# Patient Record
Sex: FEMALE | Race: WHITE | NOT HISPANIC OR LATINO | Employment: OTHER | URBAN - METROPOLITAN AREA
[De-identification: names, ages, dates, MRNs, and addresses within clinical notes are randomized per-mention and may not be internally consistent; named-entity substitution may affect disease eponyms.]

---

## 2017-04-21 ENCOUNTER — GENERIC CONVERSION - ENCOUNTER (OUTPATIENT)
Dept: OTHER | Facility: OTHER | Age: 77
End: 2017-04-21

## 2017-04-28 ENCOUNTER — GENERIC CONVERSION - ENCOUNTER (OUTPATIENT)
Dept: OTHER | Facility: OTHER | Age: 77
End: 2017-04-28

## 2017-05-01 ENCOUNTER — GENERIC CONVERSION - ENCOUNTER (OUTPATIENT)
Dept: OTHER | Facility: OTHER | Age: 77
End: 2017-05-01

## 2017-05-09 ENCOUNTER — ALLSCRIPTS OFFICE VISIT (OUTPATIENT)
Dept: OTHER | Facility: OTHER | Age: 77
End: 2017-05-09

## 2017-05-09 DIAGNOSIS — I48.0 PAROXYSMAL ATRIAL FIBRILLATION (HCC): ICD-10-CM

## 2017-05-16 ENCOUNTER — LAB CONVERSION - ENCOUNTER (OUTPATIENT)
Dept: OTHER | Facility: OTHER | Age: 77
End: 2017-05-16

## 2017-05-16 LAB
A/G RATIO (HISTORICAL): 1.6 (CALC) (ref 1–2.5)
ALBUMIN SERPL BCP-MCNC: 3.9 G/DL (ref 3.6–5.1)
ALP SERPL-CCNC: 79 U/L (ref 33–130)
ALT SERPL W P-5'-P-CCNC: 16 U/L (ref 6–29)
AST SERPL W P-5'-P-CCNC: 18 U/L (ref 10–35)
BILIRUB SERPL-MCNC: 0.4 MG/DL (ref 0.2–1.2)
BUN SERPL-MCNC: 17 MG/DL (ref 7–25)
BUN/CREA RATIO (HISTORICAL): NORMAL (CALC) (ref 6–22)
CALCIUM SERPL-MCNC: 9 MG/DL (ref 8.6–10.4)
CHLORIDE SERPL-SCNC: 105 MMOL/L (ref 98–110)
CO2 SERPL-SCNC: 28 MMOL/L (ref 20–31)
CREAT SERPL-MCNC: 0.72 MG/DL (ref 0.6–0.93)
DEPRECATED RDW RBC AUTO: 13.4 % (ref 11–15)
EGFR AFRICAN AMERICAN (HISTORICAL): 95 ML/MIN/1.73M2
EGFR-AMERICAN CALC (HISTORICAL): 82 ML/MIN/1.73M2
GAMMA GLOBULIN (HISTORICAL): 2.4 G/DL (CALC) (ref 1.9–3.7)
GLUCOSE (HISTORICAL): 81 MG/DL (ref 65–99)
HCT VFR BLD AUTO: 37.3 % (ref 35–45)
HGB BLD-MCNC: 12.2 G/DL (ref 11.7–15.5)
INR PPP: 1
MCH RBC QN AUTO: 31.6 PG (ref 27–33)
MCHC RBC AUTO-ENTMCNC: 32.7 G/DL (ref 32–36)
MCV RBC AUTO: 96.6 FL (ref 80–100)
PLATELET # BLD AUTO: 301 THOUSAND/UL (ref 140–400)
PMV BLD AUTO: 10.4 FL (ref 7.5–12.5)
POTASSIUM SERPL-SCNC: 4.5 MMOL/L (ref 3.5–5.3)
PROTHROMBIN TIME: 10.2 SEC (ref 9–11.5)
RBC # BLD AUTO: 3.86 MILLION/UL (ref 3.8–5.1)
SODIUM SERPL-SCNC: 139 MMOL/L (ref 135–146)
TOTAL PROTEIN (HISTORICAL): 6.3 G/DL (ref 6.1–8.1)
WBC # BLD AUTO: 8.3 THOUSAND/UL (ref 3.8–10.8)

## 2017-05-18 ENCOUNTER — HOSPITAL ENCOUNTER (OUTPATIENT)
Dept: CT IMAGING | Facility: HOSPITAL | Age: 77
Discharge: HOME | End: 2017-05-18
Attending: INTERNAL MEDICINE
Payer: MEDICARE

## 2017-05-18 ENCOUNTER — ALLSCRIPTS OFFICE VISIT (OUTPATIENT)
Dept: OTHER | Facility: OTHER | Age: 77
End: 2017-05-18

## 2017-05-18 DIAGNOSIS — I48.0 PAROXYSMAL ATRIAL FIBRILLATION (HCC): ICD-10-CM

## 2017-05-18 PROCEDURE — 75572 CT HRT W/3D IMAGE: CPT

## 2017-05-18 RX ADMIN — IODIXANOL 120 ML: 320 INJECTION, SOLUTION INTRAVASCULAR at 16:58

## 2018-01-13 VITALS
DIASTOLIC BLOOD PRESSURE: 60 MMHG | TEMPERATURE: 64 F | SYSTOLIC BLOOD PRESSURE: 130 MMHG | BODY MASS INDEX: 21.36 KG/M2 | WEIGHT: 120.56 LBS | HEIGHT: 63 IN

## 2018-01-14 VITALS
DIASTOLIC BLOOD PRESSURE: 70 MMHG | SYSTOLIC BLOOD PRESSURE: 140 MMHG | HEIGHT: 63 IN | WEIGHT: 120 LBS | HEART RATE: 64 BPM | BODY MASS INDEX: 21.26 KG/M2

## 2020-08-31 ENCOUNTER — OFFICE VISIT (OUTPATIENT)
Dept: DERMATOLOGY | Facility: CLINIC | Age: 80
End: 2020-08-31
Payer: COMMERCIAL

## 2020-08-31 VITALS — WEIGHT: 118 LBS | TEMPERATURE: 98.2 F | BODY MASS INDEX: 20.91 KG/M2 | HEIGHT: 63 IN

## 2020-08-31 DIAGNOSIS — L82.0 INFLAMED SEBORRHEIC KERATOSIS: ICD-10-CM

## 2020-08-31 DIAGNOSIS — D18.01 CHERRY ANGIOMA: ICD-10-CM

## 2020-08-31 DIAGNOSIS — L65.9 ALOPECIA: ICD-10-CM

## 2020-08-31 DIAGNOSIS — L71.9 ROSACEA: Primary | ICD-10-CM

## 2020-08-31 PROCEDURE — 99204 OFFICE O/P NEW MOD 45 MIN: CPT | Performed by: DERMATOLOGY

## 2020-08-31 PROCEDURE — 17111 DESTRUCTION B9 LESIONS 15/>: CPT | Performed by: DERMATOLOGY

## 2020-08-31 RX ORDER — METOPROLOL SUCCINATE 50 MG/1
50 TABLET, EXTENDED RELEASE ORAL DAILY
COMMUNITY
Start: 2020-08-28 | End: 2021-08-28

## 2020-08-31 RX ORDER — IVERMECTIN 10 MG/G
CREAM TOPICAL
Qty: 45 G | Refills: 5 | Status: SHIPPED | OUTPATIENT
Start: 2020-08-31 | End: 2021-12-15 | Stop reason: SDUPTHER

## 2020-08-31 RX ORDER — ESTRADIOL 0.1 MG/G
CREAM VAGINAL 2 TIMES WEEKLY
COMMUNITY

## 2020-08-31 RX ORDER — CYCLOSPORINE 0.5 MG/ML
1 EMULSION OPHTHALMIC 2 TIMES DAILY
COMMUNITY

## 2020-08-31 RX ORDER — PERPHENAZINE/AMITRIPTYLINE HCL 4 MG-25 MG
TABLET ORAL DAILY
COMMUNITY

## 2020-08-31 RX ORDER — CLOBETASOL PROPIONATE 0.5 MG/G
CREAM TOPICAL 2 TIMES DAILY
COMMUNITY

## 2020-08-31 NOTE — PATIENT INSTRUCTIONS
1  ALOPECIA       Assessment and Plan:  Based on a thorough discussion of this condition and the management approach to it (including a comprehensive discussion of the known risks, side effects and potential benefits of treatment), the patient (family) agrees to implement the following specific plan:    · Discussed with patient today of doing a punch biopsy in the future in the scalp area  Rule out lichen planopilaris  2  ROSACEA        Assessment and Plan:  Based on a thorough discussion of this condition and the management approach to it (including a comprehensive discussion of the known risks, side effects and potential benefits of treatment), the patient (family) agrees to implement the following specific plan:   Refilled Solatran order for rosacea  Apply topically 1-2 times a day as needed  Rosacea is a chronic rash affecting the mid-face including the nose, cheeks, chin, forehead, and eyelids  The incidence is usually greatest between the ages of 30-60 years and is more common in people with fair skin  Common characteristics include redness, telangiectasias, papules and pustules over affected areas  Rosacea may look similar to acne, but there is a lack of comedones  Occasionally the eyes may also be involved in ocular rosacea  In advanced disease, enlargement of the sebaceous glands in the nose, termed rhinophyma, may be present  Rosacea results in red spots (papules) and sometimes pustules over the face, but unlike acne there are no blackheads, whiteheads, or cystic nodules  Patients often experience increased facial flushing with prominent blood vessels (erythematotelangiectatic rosacea) and dry, sensitive skin  These symptoms are exacerbated by sun exposure, hot or spicy foods, topical steroids and oil-based facial products  In ocular rosacea, eyelids may be red and sore due to conjunctivitis, keratitis, and episcleritis   If rhinophyma develops due to enlargement of sebaceous glands, the patient may have an enlarged and irregularly shaped nose with prominent pores  In rosacea that is refractory to treatment, patients can develop persistent redness and swelling of the face due to lymphatic obstruction (Morbihan disease)  Distribution around the cheeks may be confused with the malar or butterfly rash of lupus  However, the rash of lupus spares the nasal creases and lacks papules and pustules  If signs of photosensitivity, oral ulcers, arthritis, and kidney dysfunction are present then consider referral to a rheumatologist      There are many potential causes of rosacea including genetic, environmental, vascular, and inflammatory factors   These include, but are not limited to:   Chronic exposure to ultraviolet radiation    Increased immune responses in the form of cathelicidins that promote vessel dilation and infiltration with white blood cells (neutrophils) into the dermis   Increased matrix metalloproteinases such as collagen and elastase that remodel normal tissue may contribute to inflammation of the skin making it thicker and harder   There is some evidence to suggest that increased numbers of demodex mites on patient skin may contribute to rosacea papules     General Treatment Approach    Avoid exacerbating factors such as heat, spicy foods, and alcohol    Use daily SPF30+ sunscreen and other methods of coverage for sun protection   Use water-based make-up    Avoid applying topical steroids to affected areas as they can cause perioral dermatitis and exacerbate rosacea     Topical Treatment Approach   Metronidazole cream or gel by itself or in combination with oral antibiotics for more severe cases   Azelaic acid cream or lotion is effective for mild inflammatory rosacea when applied twice daily to affected areas   Brimonidine gel and oxymetazoline hydrochloride cream can reduce facial redness temporarily    Ivermectin cream can treat papulopustular rosacea by controlling demodex mites and inflammation    Pimecrolimus cream or tacrolimus ointment twice a day for 2-3 months can help reduce inflammation    Oral Treatment Approach   Antibiotics such as doxycycline, minocycline, or erythromycin for 1-3 months   Clonidine and carvedilol can help reduce facial flushing and are generally well tolerated  Common side effects include low blood pressure, gastrointestinal upset, dry eyes, blurred vision and low heart rate   Isotretinoin at low doses can be effective for long term treatment when antibiotics fail  Side effects may make it unsuitable for some patients   NSAIDs such as diclofenac can help reduce discomfort and redness in the skin  Procedural/Surgical Treatment Approach    Vascular lasers or intense pulsed light treatment may be used to treat persistent telangiectasia and papulopustular rosacea   Plastic surgery and carbon dioxide lasers may be used to treat rhinophyma         4  SEBORRHEIC KERATOSIS; INFLAMED        Assessment and Plan:  Based on a thorough discussion of this condition and the management approach to it (including a comprehensive discussion of the known risks, side effects and potential benefits of treatment), the patient (family) agrees to implement the following specific plan:   Obtained consent form patient to treat lesion with Cryotherapy today   Liquid nitrogen was applied for 10-12 seconds to the skin lesion and the expected blistering or scabbing reaction explained  Do not pick at the area  Patient reminded to expect hypopigmented scars from the procedure  Return if lesion fails to fully resolve   Recommend patient to follow up for treatment  Seborrheic Keratosis  A seborrheic keratosis is a harmless warty spot that appears during adult life as a common sign of skin aging  Seborrheic keratoses can arise on any area of skin, covered or uncovered, with the usual exception of the palms and soles  They do not arise from mucous membranes  Seborrheic keratoses can have highly variable appearance  Seborrheic keratoses are extremely common  It has been estimated that over 90% of adults over the age of 61 years have one or more of them  They occur in males and females of all races, typically beginning to erupt in the 35s or 45s  They are uncommon under the age of 21 years  The precise cause of seborrhoeic keratoses is not known  Seborrhoeic keratoses are considered degenerative in nature  As time goes by, seborrheic keratoses tend to become more numerous  Some people inherit a tendency to develop a very large number of them; some people may have hundreds of them  The name "seborrheic keratosis" is misleading, because these lesions are not limited to a seborrhoeic distribution (scalp, mid-face, chest, upper back), nor are they formed from sebaceous glands, nor are they associated with sebum -- which is greasy  Seborrheic keratosis may also be called "SK," "Seb K," "basal cell papilloma," "senile wart," or "barnacle "      Researchers have noted:   Eruptive seborrhoeic keratoses can follow sunburn or dermatitis   Skin friction may be the reason they appear in body folds   Viral cause (e g , human papillomavirus) seems unlikely   Stable and clonal mutations or activation of FRFR3, PIK3CA, ANDREEA, AKT1 and EGFR genes are found in seborrhoeic keratoses   Seborrhoeic keratosis can arise from solar lentigo   FRFR3 mutations also arise in solar lentigines  These mutations are associated with increased age and location on the head and neck, suggesting a role of ultraviolet radiation in these lesions   Seborrheic keratoses do not harbour tumour suppressor gene mutations   Epidermal growth factor receptor inhibitors, which are used to treat some cancers, often result in an increase in verrucal (warty) keratoses  There is no easy way to remove multiple lesions on a single occasion    Unless a specific lesion is "inflamed" and is causing pain or stinging/burning or is bleeding, most insurance companies do not authorize treatment  5  JOEL ANGIOMAS        Assessment and Plan:  Based on a thorough discussion of this condition and the management approach to it (including a comprehensive discussion of the known risks, side effects and potential benefits of treatment), the patient (family) agrees to implement the following specific plan:   Reassure benign, no treatment required  Assessment and Plan:    Cherry angioma, also known as Candie Sorrel spots, are benign vascular skin lesions  A "cherry angioma" is a firm red, blue or purple papule, 0 1-1 cm in diameter  When thrombosed, they can appear black in colour until evaluated with a dermatoscope when the red or purple colour is more easily seen  Cherry angioma may develop on any part of the body but most often appear on the scalp, face, lips and trunk  An angioma is due to proliferating endothelial cells; these are the cells that line the inside of a blood vessel  Angiomas can arise in early life or later in life; the most common type of angioma is a cherry angioma  Cherry angiomas are very common in males and females of any age or race  They are more noticeable in white skin than in skin of colour  They markedly increase in number from about the age of 36  There may be a family history of similar lesions  Eruptive cherry angiomas have been rarely reported to be associated with internal malignancy  The cause of angiomas is unknown  Genetic analysis of cherry angiomas has shown that they frequently carry specific somatic missense mutations in the GNAQ and GNA11 (Q209H) genes, which are involved in other vascular and melanocytic proliferations  Cherry angioma is usually diagnosed clinically and no investigations are necessary for the majority of lesions  It has a characteristic red-clod or lobular pattern on dermatoscopy (called lacunar pattern using conventional pattern analysis)    When there is uncertainty about the diagnosis, a biopsy may be performed  The angioma is composed of venules in a thickened papillary dermis  Collagen bundles may be prominent between the lobules  Cherry angiomas are harmless, so they do not usually have to be treated  Occasionally, they are removed to exclude a malignant skin lesion such as a nodular melanoma or because they are irritated or bleeding (and a subsequent risk for infection)  To decrease friction over the lesions, we recommend Neutrogena Daily Defense SPF 50+ at least 3 times a day

## 2020-08-31 NOTE — PROGRESS NOTES
[facility] Note     Patient Name: Fermín Radford  Encounter Date: 08/31/2020     Have you been cared for by a [facility] in the last 3 years and, if so, which one? No    · Have you traveled outside of the [address] [place] [address] in the past 3 months or outside of the [facility] area in the last 2 weeks? No     May we call your Preferred Phone number to discuss your specific medical information? Yes     May we leave a detailed message that includes your specific medical information? Yes      Today's Chief Concerns:   Concern #1:  Full body exam    Concern #2:  Skin tags     Past Medical History:  Have you personally ever had or currently have any of the following? · Skin cancer (such as Melanoma, Basal Cell Carcinoma, Squamous Cell Carcinoma? (If Yes, please provide more detail)- No  · Eczema: No  · Psoriasis: No  · HIV/AIDS: No  · Hepatitis B or C: No  · Tuberculosis: No  · Systemic Immunosuppression such as Diabetes, Biologic or Immunotherapy, Chemotherapy, Organ Transplantation, Bone Marrow Transplantation (If YES, please provide more detail): No  · Radiation Treatment (If YES, please provide more detail): No  · Any other major medical conditions/concerns? (If Yes, which types)- YES, Rosacea, Purpura     Social History:     What is/was your primary occupation? n/a     What are your hobbies/past-times? n/a    Family History:  Have any of your "first degree relatives" (parent, brother, sister, or child) had any of the following       · Skin cancer such as Melanoma or Merkel Cell Carcinoma or Pancreatic Cancer? No  · Eczema, Asthma, Hay Fever or Seasonal Allergies: YES, Sister has hx of skin problems   · Psoriasis or Psoriatic Arthritis: No  · Do any other medical conditions seem to run in your family? If Yes, what condition and which relatives?   YES, uterian cancer     Current Medications:   (please update all dermatological medications before printing patient's AVS!)      Current Outpatient Medications:     metoprolol succinate (TOPROL-XL) 50 mg 24 hr tablet, Take 50 mg by mouth daily, Disp: , Rfl:     clobetasol (TEMOVATE) 0 05 % cream, Apply topically 2 (two) times a day, Disp: , Rfl:     cycloSPORINE (RESTASIS) 0 05 % ophthalmic emulsion, 1 drop by Tube route 2 (two) times a day, Disp: , Rfl:     estradiol (ESTRACE) 0 1 mg/g vaginal cream, Insert into the vagina 2 (two) times a week, Disp: , Rfl:     Lutein 40 MG CAPS, Take by mouth daily, Disp: , Rfl:     Multiple Vitamin-Folic Acid TABS, Take by mouth, Disp: , Rfl:       Review of Systems:  Have you recently had or currently have any of the following? If YES, what are you doing for the problem? · Fever, chills or unintended weight loss: No  · Sudden loss or change in your vision: No  · Nausea, vomiting or blood in your stool: No  · Painful or swollen joints: No  · Wheezing or cough: No  · Changing mole or non-healing wound: No  · Nosebleeds: No  · Excessive sweating: No  · Easy or prolonged bleeding? No  · Over the last 2 weeks, how often have you been bothered by the following problems? · Taking little interest or pleasure in doing things: 1 - Not at All  · Feeling down, depressed, or hopeless: 1 - Not at All  · Rapid heartbeat with epinephrine:  YES, rapid heartbeat     · FEMALES ONLY:    · Are you pregnant or planning to become pregnant? No  · Are you currently or planning to be nursing or breast feeding? No    · Any known allergies? Allergies   Allergen Reactions    Cephalosporins Rash         Physical Exam:     Was a chaperone (Derm Clinical Assistant) present throughout the entire Physical Exam? Yes     Did the Dermatology Team specifically  the patient on the importance of a Full Skin Exam to be sure that nothing is missed clinically?  Yes}  o Did the patient ultimately request or accept a Full Skin Exam?  Yes  o Did the patient specifically refuse to have the areas "under-the-bra" examined by the Dermatologist? No  o Did the patient specifically refuse to have the areas "under-the-underwear" examined by the Dermatologist? No    CONSTITUTIONAL:   Vitals:    08/31/20 1133   Temp: 98 2 °F (36 8 °C)   TempSrc: Temporal   Weight: 53 5 kg (118 lb)   Height: 5' 3" (1 6 m)         PSYCH: Normal mood and affect  EYES: Normal conjunctiva  ENT: Normal lips and oral mucosa  CARDIOVASCULAR: No edema  RESPIRATORY: Normal respirations  HEME/LYMPH/IMMUNO:  No regional lymphadenopathy except as noted below in "ASSESSMENT AND PLAN BY DIAGNOSIS"    SKIN:  FULL ORGAN SYSTEM EXAM   Hair, Scalp, Ears, Face Normal except as noted below in Assessment   Neck, Cervical Chain Nodes Normal except as noted below in Assessment   Right Arm/Hand/Fingers Normal except as noted below in Assessment   Left Arm/Hand/Fingers Normal except as noted below in Assessment   Chest/Breasts/Axillae Viewed areas Normal except as noted below in Assessment   Abdomen, Umbilicus Normal except as noted below in Assessment   Back/Spine Normal except as noted below in Assessment   Groin/Genitalia/Buttocks Normal except as noted below in Assessment   Right Leg, Foot, Toes Normal except as noted below in Assessment   Left Leg, Foot, Toes Normal except as noted below in Assessment        Assessment and Plan by Diagnosis:    History of Present Condition:     Duration:  How long has this been an issue for you?    o  years    Location Affected:  Where on the body is this affecting you?    o  arms    Quality:  Is there any bleeding, pain, itch, burning/irritation, or redness associated with the skin lesion? o  redness    Severity:  Describe any bleeding, pain, itch, burning/irritation, or redness on a scale of 1 to 10 (with 10 being the worst)  o  0   Timing:  Does this condition seem to be there pretty constantly or do you notice it more at specific times throughout the day?     o  comes and goes    Context:  Have you ever noticed that this condition seems to be associated with specific activities you do?    o  denies    Modifying Factors:    o Anything that seems to make the condition worse?    -  denies   o What have you tried to do to make the condition better?    -  denies    Associated Signs and Symptoms:  Does this skin lesion seem to be associated with any of the following:  o  SL AMB DERM SIGNS AND SYMPTOMS: Redness and Itching and Scratching         1  ALOPECIA   Physical Exam:   Anatomic Location Affected:  Scalp   Morphological Description:  Diffused thinning with foci of follicular hyperkeratosis   Pertinent Positives:   Pertinent Negatives: Additional History of Present Condition:  Present for a while     Assessment and Plan:  Based on a thorough discussion of this condition and the management approach to it (including a comprehensive discussion of the known risks, side effects and potential benefits of treatment), the patient (family) agrees to implement the following specific plan:    · Discussed with patient today of doing a punch biopsy in the future in the scalp area  Rule out lichen planopilaris  2  ROSACEA    Physical Exam:   Anatomic Location Affected: Face area   Morphological Description:  Erythema and telangiectasia   Pertinent Positives:   Pertinent Negatives: Additional History of Present Condition:  Patient has history of rosacea and is currently using Soolantra 1% cream to maintain it  Assessment and Plan:  Based on a thorough discussion of this condition and the management approach to it (including a comprehensive discussion of the known risks, side effects and potential benefits of treatment), the patient (family) agrees to implement the following specific plan:   Refilled Soolantra 1% cream order for rosacea  Apply topiclaly to face area 1-2 times a day as needed  Rosacea is a chronic rash affecting the mid-face including the nose, cheeks, chin, forehead, and eyelids   The incidence is usually greatest between the ages of 30-60 years and is more common in people with fair skin  Common characteristics include redness, telangiectasias, papules and pustules over affected areas  Rosacea may look similar to acne, but there is a lack of comedones  Occasionally the eyes may also be involved in ocular rosacea  In advanced disease, enlargement of the sebaceous glands in the nose, termed rhinophyma, may be present  Rosacea results in red spots (papules) and sometimes pustules over the face, but unlike acne there are no blackheads, whiteheads, or cystic nodules  Patients often experience increased facial flushing with prominent blood vessels (erythematotelangiectatic rosacea) and dry, sensitive skin  These symptoms are exacerbated by sun exposure, hot or spicy foods, topical steroids and oil-based facial products  In ocular rosacea, eyelids may be red and sore due to conjunctivitis, keratitis, and episcleritis  If rhinophyma develops due to enlargement of sebaceous glands, the patient may have an enlarged and irregularly shaped nose with prominent pores  In rosacea that is refractory to treatment, patients can develop persistent redness and swelling of the face due to lymphatic obstruction (Morbihan disease)  Distribution around the cheeks may be confused with the malar or butterfly rash of lupus  However, the rash of lupus spares the nasal creases and lacks papules and pustules  If signs of photosensitivity, oral ulcers, arthritis, and kidney dysfunction are present then consider referral to a rheumatologist      There are many potential causes of rosacea including genetic, environmental, vascular, and inflammatory factors   These include, but are not limited to:   Chronic exposure to ultraviolet radiation    Increased immune responses in the form of cathelicidins that promote vessel dilation and infiltration with white blood cells (neutrophils) into the dermis   Increased matrix metalloproteinases such as collagen and elastase that remodel normal tissue may contribute to inflammation of the skin making it thicker and harder   There is some evidence to suggest that increased numbers of demodex mites on patient skin may contribute to rosacea papules     General Treatment Approach    Avoid exacerbating factors such as heat, spicy foods, and alcohol    Use daily SPF30+ sunscreen and other methods of coverage for sun protection   Use water-based make-up    Avoid applying topical steroids to affected areas as they can cause perioral dermatitis and exacerbate rosacea     Topical Treatment Approach   Metronidazole cream or gel by itself or in combination with oral antibiotics for more severe cases   Azelaic acid cream or lotion is effective for mild inflammatory rosacea when applied twice daily to affected areas   Brimonidine gel and oxymetazoline hydrochloride cream can reduce facial redness temporarily    Ivermectin cream can treat papulopustular rosacea by controlling demodex mites and inflammation    Pimecrolimus cream or tacrolimus ointment twice a day for 2-3 months can help reduce inflammation    Oral Treatment Approach   Antibiotics such as doxycycline, minocycline, or erythromycin for 1-3 months   Clonidine and carvedilol can help reduce facial flushing and are generally well tolerated  Common side effects include low blood pressure, gastrointestinal upset, dry eyes, blurred vision and low heart rate   Isotretinoin at low doses can be effective for long term treatment when antibiotics fail  Side effects may make it unsuitable for some patients   NSAIDs such as diclofenac can help reduce discomfort and redness in the skin       Procedural/Surgical Treatment Approach    Vascular lasers or intense pulsed light treatment may be used to treat persistent telangiectasia and papulopustular rosacea   Plastic surgery and carbon dioxide lasers may be used to treat rhinophyma 4  SEBORRHEIC KERATOSIS; INFLAMED    Physical Exam:   Anatomic Location Affected:  Trunks and extremities    Morphological Description:  Flat and raised, waxy, smooth to warty textured, yellow to brownish-grey to dark brown to blackish, discrete, "stuck-on" appearing papules   Pertinent Positives:   Pertinent Negatives: Additional History of Present Condition:  Patient reports new bumps on the skin  Denies itch, burn, pain, bleeding or ulceration  Present constantly; nothing seems to make it worse or better  No prior treatment  Assessment and Plan:  Based on a thorough discussion of this condition and the management approach to it (including a comprehensive discussion of the known risks, side effects and potential benefits of treatment), the patient (family) agrees to implement the following specific plan:   Obtained consent form patient to treat lesion with Cryotherapy today   Liquid nitrogen was applied for 10-12 seconds to the skin lesion and the expected blistering or scabbing reaction explained  Do not pick at the area  Patient reminded to expect hypopigmented scars from the procedure  Return if lesion fails to fully resolve  PROCEDURE:  DESTRUCTION OF BENIGN LESIONS  After a thorough discussion of treatment options and risk/benefits/alternatives (including but not limited to local pain, scarring, dyspigmentation, blistering, and possible superinfection), verbal and written consent were obtained and the aforementioned lesions were treated on with cryotherapy using liquid nitrogen x 1 cycle for 5-10 seconds  TOTAL NUMBER of 15 lesions were treated today on the ANATOMIC LOCATION: Back, bilateral legs and arms  The patient tolerated the procedure well, and after-care instructions were provided  Seborrheic Keratosis  A seborrheic keratosis is a harmless warty spot that appears during adult life as a common sign of skin aging    Seborrheic keratoses can arise on any area of skin, covered or uncovered, with the usual exception of the palms and soles  They do not arise from mucous membranes  Seborrheic keratoses can have highly variable appearance  Seborrheic keratoses are extremely common  It has been estimated that over 90% of adults over the age of 61 years have one or more of them  They occur in males and females of all races, typically beginning to erupt in the 35s or 45s  They are uncommon under the age of 21 years  The precise cause of seborrhoeic keratoses is not known  Seborrhoeic keratoses are considered degenerative in nature  As time goes by, seborrheic keratoses tend to become more numerous  Some people inherit a tendency to develop a very large number of them; some people may have hundreds of them  The name "seborrheic keratosis" is misleading, because these lesions are not limited to a seborrhoeic distribution (scalp, mid-face, chest, upper back), nor are they formed from sebaceous glands, nor are they associated with sebum -- which is greasy  Seborrheic keratosis may also be called "SK," "Seb K," "basal cell papilloma," "senile wart," or "barnacle "      Researchers have noted:   Eruptive seborrhoeic keratoses can follow sunburn or dermatitis   Skin friction may be the reason they appear in body folds   Viral cause (e g , human papillomavirus) seems unlikely   Stable and clonal mutations or activation of FRFR3, PIK3CA, ANDREEA, AKT1 and EGFR genes are found in seborrhoeic keratoses   Seborrhoeic keratosis can arise from solar lentigo   FRFR3 mutations also arise in solar lentigines  These mutations are associated with increased age and location on the head and neck, suggesting a role of ultraviolet radiation in these lesions   Seborrheic keratoses do not harbour tumour suppressor gene mutations   Epidermal growth factor receptor inhibitors, which are used to treat some cancers, often result in an increase in verrucal (warty) keratoses      There is no easy way to remove multiple lesions on a single occasion  Unless a specific lesion is "inflamed" and is causing pain or stinging/burning or is bleeding, most insurance companies do not authorize treatment  5  JOEL ANGIOMAS    Physical Exam:   Anatomic Location Affected:  Trunk and extremities    Morphological Description:  Scattered cherry red, 1-4 mm papules   Pertinent Positives:   Pertinent Negatives: Additional History of Present Condition:  Present for a while    Assessment and Plan:  Based on a thorough discussion of this condition and the management approach to it (including a comprehensive discussion of the known risks, side effects and potential benefits of treatment), the patient (family) agrees to implement the following specific plan:   Reassure benign, no treatment required  Assessment and Plan:    Cherry angioma, also known as [employer] spots, are benign vascular skin lesions  A "cherry angioma" is a firm red, blue or purple papule, 0 1-1 cm in diameter  When thrombosed, they can appear black in colour until evaluated with a dermatoscope when the red or purple colour is more easily seen  Cherry angioma may develop on any part of the body but most often appear on the scalp, face, lips and trunk  An angioma is due to proliferating endothelial cells; these are the cells that line the inside of a blood vessel  Angiomas can arise in early life or later in life; the most common type of angioma is a cherry angioma  Cherry angiomas are very common in males and females of any age or race  They are more noticeable in white skin than in skin of colour  They markedly increase in number from about the age of 36  There may be a family history of similar lesions  Eruptive cherry angiomas have been rarely reported to be associated with internal malignancy  The cause of angiomas is unknown   Genetic analysis of cherry angiomas has shown that they frequently carry specific somatic missense mutations in the GNAQ and GNA11 (Q209H) genes, which are involved in other vascular and melanocytic proliferations  Cherry angioma is usually diagnosed clinically and no investigations are necessary for the majority of lesions  It has a characteristic red-clod or lobular pattern on dermatoscopy (called lacunar pattern using conventional pattern analysis)  When there is uncertainty about the diagnosis, a biopsy may be performed  The angioma is composed of venules in a thickened papillary dermis  Collagen bundles may be prominent between the lobules  Cherry angiomas are harmless, so they do not usually have to be treated  Occasionally, they are removed to exclude a malignant skin lesion such as a nodular melanoma or because they are irritated or bleeding (and a subsequent risk for infection)  To decrease friction over the lesions, we recommend Neutrogena Daily Defense SPF 50+ at least 3 times a day        Scribe Attestation    I,:   Andi Rick MA am acting as a scribe while in the presence of the attending physician :        I,:   Myles Marie MD personally performed the services described in this documentation    as scribed in my presence :

## 2020-09-22 ENCOUNTER — OFFICE VISIT (OUTPATIENT)
Dept: DERMATOLOGY | Facility: CLINIC | Age: 80
End: 2020-09-22
Payer: COMMERCIAL

## 2020-09-22 VITALS — HEIGHT: 63 IN | WEIGHT: 122 LBS | BODY MASS INDEX: 21.62 KG/M2 | TEMPERATURE: 96.9 F

## 2020-09-22 DIAGNOSIS — L66.1 LICHEN PLANOPILARIS: Primary | ICD-10-CM

## 2020-09-22 DIAGNOSIS — L43.8 ORAL LICHEN PLANUS: ICD-10-CM

## 2020-09-22 DIAGNOSIS — L43.8 EROSIVE LICHEN PLANUS OF VULVA: ICD-10-CM

## 2020-09-22 PROCEDURE — 11900 INJECT SKIN LESIONS </W 7: CPT | Performed by: DERMATOLOGY

## 2020-09-22 PROCEDURE — 99212 OFFICE O/P EST SF 10 MIN: CPT | Performed by: DERMATOLOGY

## 2020-09-22 RX ORDER — CLOBETASOL PROPIONATE 0.46 MG/ML
SOLUTION TOPICAL 2 TIMES DAILY
Qty: 50 ML | Refills: 3 | Status: SHIPPED | OUTPATIENT
Start: 2020-09-22 | End: 2021-04-27

## 2020-09-22 NOTE — PROGRESS NOTES
[facility] Follow Up Note    Patient Name: Klaus Quijano  Encounter Date: 9/22/2020    Today's Chief Concerns:  [facility] Concern #1:  Follow up for a possible biopsy (Alopecia)      Current Medications:    Current Outpatient Medications:     clobetasol (TEMOVATE) 0 05 % cream, Apply topically 2 (two) times a day, Disp: , Rfl:     cycloSPORINE (RESTASIS) 0 05 % ophthalmic emulsion, 1 drop by Tube route 2 (two) times a day, Disp: , Rfl:     estradiol (ESTRACE) 0 1 mg/g vaginal cream, Insert into the vagina 2 (two) times a week, Disp: , Rfl:     Ivermectin (Soolantra) 1 % CREA, Apply topically 1-2 times a day to face area as needed for rosacea , Disp: 45 g, Rfl: 5    Lutein 40 MG CAPS, Take by mouth daily, Disp: , Rfl:     metoprolol succinate (TOPROL-XL) 50 mg 24 hr tablet, Take 50 mg by mouth daily, Disp: , Rfl:     Multiple Vitamin-Folic Acid TABS, Take by mouth, Disp: , Rfl:     CONSTITUTIONAL:   Vitals:    09/22/20 1253   Temp: (!) 96 9 °F (36 1 °C)   TempSrc: Tympanic   Weight: 55 3 kg (122 lb)   Height: 5' 3" (1 6 m)           Specific Alerts:    Have you been seen by a [facility] in the last 3 years? YES    Are you pregnant or planning to become pregnant? N/A    Are you currently or planning to be nursing or breast feeding? N/A    Allergies   Allergen Reactions    Mupirocin     Cephalosporins Rash       May we call your Preferred Phone number to discuss your specific medical information? YES    May we leave a detailed message that includes your specific medical information? YES    Have you traveled outside of the [facility] in the past 3 months? No    Do you currently have a pacemaker or defibrillator? No    Do you have any artificial heart valves, joints, plates, screws, rods, stents, pins, etc? YES, Lube recorder   - If Yes, were any placed within the last 2 years? In 2017    Do you require any medications prior to a surgical procedure?  No       Are you taking any medications that cause you to bleed more easily ("blood thinners") No    Have you ever experienced a rapid heartbeat with epinephrine? YES    Have you ever been treated with "gold" (gold sodium thiomalate) therapy? No    [facility] can help with wrinkles, "laugh lines," facial volume loss, "double chin," "love handles," age spots, and more  Are you interested in learning today about some of the skin enhancement procedures that we offer? (If Yes, please provide more detail) No    Review of Systems:  Have you recently had or currently have any of the following? · Fever or chills: No  · Night Sweats: No  · Headaches: No  · Weight Gain: No  · Weight Loss: No  · Blurry Vision: No  · Nausea: No  · Vomiting: No  · Diarrhea: No  · Blood in Stool: No  · Abdominal Pain: No  · Itchy Skin: No  · Painful Joints: YES  · Swollen Joints: No  · Muscle Pain: No  · Irregular Mole: No  · Sun Burn: No  · Dry Skin: YES  · Skin Color Changes: No  · Scar or Keloid: No  · Cold Sores/Fever Blisters: No  · Bacterial Infections/MRSA: No  · Anxiety: No  · Depression: No  · Suicidal or Homicidal Thoughts: No      PSYCH: Normal mood and affect  EYES: Normal conjunctiva  ENT: Normal lips and oral mucosa  CARDIOVASCULAR: No edema  RESPIRATORY: Normal respirations  HEME/LYMPH/IMMUNO:  No regional lymphadenopathy except as noted below in ASSESSMENT AND PLAN BY DIAGNOSIS    FULL ORGAN SYSTEM SKIN EXAM (SKIN)   Scalp Normal except as noted below in Assessment   Neck, Cervical Chain Nodes    Right Arm/Hand/Fingers    Left Arm/Hand/Fingers    Chest/Breasts/Axillae    Abdomen, Umbilicus    Back/Spine    Groin/Genitalia/Buttocks    Right Leg, Foot, Toes    Left Leg, Foot, Toes        1  ALOPECIA/ LICHEN PLANOPILARIS    Physical Exam:   Anatomic Location Affected: Anterior scalp    Morphological Description:  Thinning, follicular hyperkeratosis    Pertinent Positives:   Pertinent Negatives:     Additional History of Present Condition: Long history of hair loss  Lichen planus vulvar area  Assessment and Plan:Original plan was to do a scalp biopsy today, but during discussion it was discovered that patient already has oral and vaginal lichen planus  Since clinically the alopecia is c/w LPP, we decided to just proceed with treatment and not biopsy  ILTA given to anterior scalp in triangular area  Start clobetasol once daily  Can also add rogaine once daily  Consider dapsone  Based on a thorough discussion of this condition and the management approach to it (including a comprehensive discussion of the known risks, side effects and potential benefits of treatment), the patient (family) agrees to implement the following specific plan:   Discussed treatment options: Oral, laser treatments, instralesional injections   Start Clobetasol 0 05% solution twice a day    Discussed plaquenil as an option of treatment   Follow up in 6 weeks  Scheduled 11/2/20 at 11:30 am in [place] with Dr Low Abreu  PROCEDURE:  INTRALESIONAL STEROID INJECTION (KENALOG INJECTION)    Purpose: Triamcinolone is a synthetic glucocorticoid corticosteroid that has marked anti-inflammatory action  It is prepared in sterile aqueous suspension suitable for injecting directly into a lesion on or immediately below the skin to treat a dermal inflammatory process  Indications: It is indicated for alopecia areata; inflammatory acne cysts; discoid lupus erythematosus; keloids and hypertrophic scars; inflammatory lesions of granuloma annulare, lichen planus, lichen simplex chronicus (neurodermatitis), psoriatic plaques, and other localized inflammatory skin conditions       Potential Side Effects: I understand that triamcinolone injection can potentially cause early and/or delayed adverse effects such as:    Pain    Impaired wound healing    Increased hair growth    Bleeding    White or brown marks    Steroid acne    Infection    Telangiectasia    Skin thinning  Cutaneous and subcutaneous lipoatrophy (most common) appearing as skin indentations or dimples around the injection sites a few weeks after treatment     PROCEDURE NOTE:  After verbal and written consent were obtained, the to-be-treated area was wiped and cleaned with rubbing alcohol 70%  Then, a total of 3 mL of Kenalog CONCENTRATION:  50 mg/5mL   (Lot# [id number]; Expiration JUN 2021, NDC#: 5596-6067-25) was injected intralesionally into a total of 3 lesion/s on the following anatomic areas:  Anterior scalp using a 1-mL syringe and a 30-gauge needle  There was less than 1 mL of blood loss and little to no discomfort  The area was bandaged with a Band-aid  The patient tolerated the procedure well and remained in the office for observation  With no signs of an adverse reaction, the patient was eventually discharged from clinic            Scribe Attestation    I,:   Dominick Quiroga am acting as a scribe while in the presence of the attending physician :        I,:   Amish Mckeon MD personally performed the services described in this documentation    as scribed in my presence :

## 2020-10-02 ENCOUNTER — TELEPHONE (OUTPATIENT)
Dept: DERMATOLOGY | Facility: CLINIC | Age: 80
End: 2020-10-02

## 2020-11-02 ENCOUNTER — OFFICE VISIT (OUTPATIENT)
Dept: DERMATOLOGY | Facility: CLINIC | Age: 80
End: 2020-11-02
Payer: COMMERCIAL

## 2020-11-02 ENCOUNTER — TELEPHONE (OUTPATIENT)
Dept: DERMATOLOGY | Facility: CLINIC | Age: 80
End: 2020-11-02

## 2020-11-02 VITALS — TEMPERATURE: 97.6 F | WEIGHT: 119.4 LBS | BODY MASS INDEX: 20.38 KG/M2 | HEIGHT: 64 IN

## 2020-11-02 DIAGNOSIS — L91.8 SKIN TAG: ICD-10-CM

## 2020-11-02 DIAGNOSIS — L82.0 INFLAMED SEBORRHEIC KERATOSIS: ICD-10-CM

## 2020-11-02 DIAGNOSIS — L66.1 LICHEN PLANOPILARIS: ICD-10-CM

## 2020-11-02 DIAGNOSIS — L71.9 ROSACEA: Primary | ICD-10-CM

## 2020-11-02 PROCEDURE — 17111 DESTRUCTION B9 LESIONS 15/>: CPT | Performed by: DERMATOLOGY

## 2020-11-02 PROCEDURE — 99214 OFFICE O/P EST MOD 30 MIN: CPT | Performed by: DERMATOLOGY

## 2020-11-02 RX ORDER — DOXYCYCLINE HYCLATE 20 MG
20 TABLET ORAL DAILY
Qty: 30 TABLET | Refills: 1 | Status: SHIPPED | OUTPATIENT
Start: 2020-11-02 | End: 2020-12-02

## 2020-12-22 ENCOUNTER — OFFICE VISIT (OUTPATIENT)
Dept: DERMATOLOGY | Age: 80
End: 2020-12-22
Payer: COMMERCIAL

## 2020-12-22 VITALS — HEIGHT: 64 IN | BODY MASS INDEX: 20.32 KG/M2 | WEIGHT: 119 LBS

## 2020-12-22 DIAGNOSIS — L66.1 LICHEN PLANOPILARIS: Primary | ICD-10-CM

## 2020-12-22 PROCEDURE — 99213 OFFICE O/P EST LOW 20 MIN: CPT | Performed by: DERMATOLOGY

## 2020-12-22 RX ORDER — DUTASTERIDE 0.5 MG/1
0.5 CAPSULE, LIQUID FILLED ORAL DAILY
Qty: 90 CAPSULE | Refills: 0 | Status: SHIPPED | OUTPATIENT
Start: 2020-12-22 | End: 2021-03-31 | Stop reason: SDUPTHER

## 2021-01-15 ENCOUNTER — TELEPHONE (OUTPATIENT)
Dept: DERMATOLOGY | Age: 81
End: 2021-01-15

## 2021-01-15 NOTE — TELEPHONE ENCOUNTER
Called and left message that she can begin excimer laser treatment for the lichen planopilaris once insurance approval is received

## 2021-01-19 NOTE — TELEPHONE ENCOUNTER
Patient called to make an appt  not sure of coverage for the laser treatment she states that our office would verify coverage for her  Please advise

## 2021-01-20 DIAGNOSIS — Z23 ENCOUNTER FOR IMMUNIZATION: ICD-10-CM

## 2021-01-25 ENCOUNTER — DOCUMENTATION (OUTPATIENT)
Dept: DERMATOLOGY | Facility: CLINIC | Age: 81
End: 2021-01-25

## 2021-01-25 ENCOUNTER — OFFICE VISIT (OUTPATIENT)
Dept: DERMATOLOGY | Facility: CLINIC | Age: 81
End: 2021-01-25
Payer: COMMERCIAL

## 2021-01-25 ENCOUNTER — TELEPHONE (OUTPATIENT)
Dept: DERMATOLOGY | Facility: CLINIC | Age: 81
End: 2021-01-25

## 2021-01-25 DIAGNOSIS — L66.1 LICHEN PLANOPILARIS: Primary | ICD-10-CM

## 2021-01-25 PROCEDURE — 96920 EXCIMER LSR PSRIASIS<250SQCM: CPT | Performed by: DERMATOLOGY

## 2021-01-25 NOTE — PROGRESS NOTES
PHOTOTHERAPY EXCIMER LASER TREATMENT NURSE VISIT    Physical Exam   Diagnosis: Lichen Planopilaris    Anatomic location affected: Scalp    Area 1:Scalp   o Color: Light Pink, First treatment   o Sensitivity/Pain:None  o Tempeture/Heat:None  o Increase %:  o Mjoules: 400     Total Dosed Cm²: 56    Plan:   Treatment Schedule: 2x a week    Topical Applied: yes - mineral oil   Is it a Combination treatment?  Yes patient also using clobetasol         Based on a thorough discussion of this condition and the management approach to it (including a comprehensive discussion of the known risks, side effects and potential benefits of treatment), the patient (family) agrees to implement the following specific plan:    Patient wore appropriate eye protection   All questions were answered no complications reported   Patient's next scheduled appointment: 01/28/2021

## 2021-01-25 NOTE — TELEPHONE ENCOUNTER
Letter of medial necessity faxed to [facility] [phone] for Excimer laser treatment

## 2021-01-25 NOTE — PROGRESS NOTES
Diagnosis: Lichen planopilaris    Severity:Moderate    Treatment location: scalp  Total BSA%:15%    # of treatment per week: 2 X WEEK start at 400mJ      Topical Application:  Mineral oil       Is patient taking Methotrexate or Psoralen?: No,    Duration of treatment: 9 month

## 2021-01-28 ENCOUNTER — OFFICE VISIT (OUTPATIENT)
Dept: DERMATOLOGY | Facility: CLINIC | Age: 81
End: 2021-01-28
Payer: COMMERCIAL

## 2021-01-28 DIAGNOSIS — L66.1 LICHEN PLANOPILARIS: Primary | ICD-10-CM

## 2021-01-28 PROCEDURE — 96920 EXCIMER LSR PSRIASIS<250SQCM: CPT | Performed by: DERMATOLOGY

## 2021-01-28 NOTE — PROGRESS NOTES
PHOTOTHERAPY EXCIMER LASER TREATMENT NURSE VISIT    Physical Exam   Diagnosis: Lichen planopilaris    Anatomic location affected: Scalp    Area 1:Scalp   o Color: None  o Sensitivity/Pain:None  o Tempeture/Heat:None  o Increase %: 25%  o Mjoules: 500     Total Dosed Cm²: 46    Plan:   Treatment Schedule: 2x a week    Topical Applied: yes - mineral oil    Is it a Combination treatment?  No        Based on a thorough discussion of this condition and the management approach to it (including a comprehensive discussion of the known risks, side effects and potential benefits of treatment), the patient (family) agrees to implement the following specific plan:    Patient wore appropriate eye protection   All questions were answered no complications reported   Patient's next scheduled appointment: 02/04/2021

## 2021-02-04 ENCOUNTER — OFFICE VISIT (OUTPATIENT)
Dept: DERMATOLOGY | Facility: CLINIC | Age: 81
End: 2021-02-04
Payer: COMMERCIAL

## 2021-02-04 DIAGNOSIS — L66.1 LICHEN PLANOPILARIS: Primary | ICD-10-CM

## 2021-02-04 PROCEDURE — 96920 EXCIMER LSR PSRIASIS<250SQCM: CPT | Performed by: DERMATOLOGY

## 2021-02-04 NOTE — PROGRESS NOTES
PHOTOTHERAPY EXCIMER LASER TREATMENT NURSE VISIT    Physical Exam   Diagnosis: Lichen planopilaris    Anatomic location affected:    Area 1: Scalp  o Color: Light Pink  o Sensitivity/Pain:None  o Tempeture/Heat:None  o Increase %: 26  o Mjoules: 630     Total Dosed Cm²: 42    Plan:   Treatment Schedule: 2x/week   Topical Applied: yes - mineral oil   Is it a Combination treatment?  No         Based on a thorough discussion of this condition and the management approach to it (including a comprehensive discussion of the known risks, side effects and potential benefits of treatment), the patient (family) agrees to implement the following specific plan:    Patient wore appropriate eye protection   All questions were answered no complications reported   Patient's next scheduled appointment: 2/8/21

## 2021-02-08 ENCOUNTER — OFFICE VISIT (OUTPATIENT)
Dept: DERMATOLOGY | Facility: CLINIC | Age: 81
End: 2021-02-08
Payer: COMMERCIAL

## 2021-02-08 DIAGNOSIS — L66.1 LICHEN PLANOPILARIS: Primary | ICD-10-CM

## 2021-02-08 PROCEDURE — 96920 EXCIMER LSR PSRIASIS<250SQCM: CPT | Performed by: DERMATOLOGY

## 2021-02-08 NOTE — PROGRESS NOTES
PHOTOTHERAPY EXCIMER LASER TREATMENT NURSE VISIT    Physical Exam   Diagnosis: Lichen planopilaris   Anatomic location affected:    Area 1:Scalp  o Color: Light Pink  o Sensitivity/Pain:None  o Tempeture/Heat:None  o Increase %: 25  o Mjoules: 790     Total Dosed Cm²: 49     Plan:   Treatment Schedule: 2x/week    Topical Applied: yes - mineral oil    Is it a Combination treatment?  No        Based on a thorough discussion of this condition and the management approach to it (including a comprehensive discussion of the known risks, side effects and potential benefits of treatment), the patient (family) agrees to implement the following specific plan:    Patient wore appropriate eye protection   All questions were answered no complications reported   Patient's next scheduled appointment: T[facility]Y

## 2021-02-11 ENCOUNTER — OFFICE VISIT (OUTPATIENT)
Dept: DERMATOLOGY | Age: 81
End: 2021-02-11
Payer: COMMERCIAL

## 2021-02-11 VITALS — TEMPERATURE: 97 F | BODY MASS INDEX: 20.43 KG/M2 | HEIGHT: 64 IN

## 2021-02-11 DIAGNOSIS — L66.1 LICHEN PLANOPILARIS: Primary | ICD-10-CM

## 2021-02-11 PROBLEM — L66.10 LICHEN PLANOPILARIS: Status: ACTIVE | Noted: 2021-02-11

## 2021-02-11 PROCEDURE — 99213 OFFICE O/P EST LOW 20 MIN: CPT | Performed by: DERMATOLOGY

## 2021-02-11 NOTE — TELEPHONE ENCOUNTER
Pt called saying she was seen in the [facility] office today, Dr Angelique Moreira wanted her to get blood work done today  She wanted ORTIZ James to know she has to be fasting and will get it gone tomorrow  She was told a message would be sent to her making her aware

## 2021-02-11 NOTE — PATIENT INSTRUCTIONS
ALOPECIA/LICHEN PLANOPILARIS FOLLOW UP    Assessment and Plan:  Based on a thorough discussion of this condition and the management approach to it (including a comprehensive discussion of the known risks, side effects and potential benefits of treatment), the patient (family) agrees to implement the following specific plan:  · Oral medication Dapsone, monitoring of blood count, isotretinoin, acitretin, cyclosporin can affect kidney, Finasteride, Propecia, dutasteride requires least amount of monitoring  · Active lab orders are in chart, can get lab work done today  · Patient would like to hold off on starting new medications until after receiving her 2nd COVID vaccine, schedule for Saturday  Will hold off one week, monitor symptoms, then we can start Dapsone    · Continue taking dutasteride  · Continue using clobetasol

## 2021-02-11 NOTE — PROGRESS NOTES
[facility] Follow Up Note    Patient Name: Margarita Carias  Encounter Date: 2/11/2021    Today's Chief Concerns:  [facility] Concern #1:  Lichen planopilaris    Current Medications:    Current Outpatient Medications:     clobetasol (TEMOVATE) 0 05 % cream, Apply topically 2 (two) times a day, Disp: , Rfl:     clobetasol (TEMOVATE) 0 05 % external solution, Apply topically 2 (two) times a day, Disp: 50 mL, Rfl: 3    cycloSPORINE (RESTASIS) 0 05 % ophthalmic emulsion, 1 drop by Tube route 2 (two) times a day, Disp: , Rfl:     dutasteride (AVODART) 0 5 mg capsule, Take 1 capsule (0 5 mg total) by mouth daily, Disp: 90 capsule, Rfl: 0    estradiol (ESTRACE) 0 1 mg/g vaginal cream, Insert into the vagina 2 (two) times a week, Disp: , Rfl:     Lutein 40 MG CAPS, Take by mouth daily, Disp: , Rfl:     metoprolol succinate (TOPROL-XL) 50 mg 24 hr tablet, Take 50 mg by mouth daily, Disp: , Rfl:     Multiple Vitamin-Folic Acid TABS, Take by mouth, Disp: , Rfl:     Ivermectin (Soolantra) 1 % CREA, Apply topically 1-2 times a day to face area as needed for rosacea  (Patient not taking: Reported on 1/25/2021), Disp: 45 g, Rfl: 5    CONSTITUTIONAL:   Vitals:    02/11/21 1045   Temp: (!) 97 °F (36 1 °C)   TempSrc: Temporal   Height: 5' 4" (1 626 m)       Specific Alerts:    Have you been seen by a [facility] in the last 3 years? YES    Are you pregnant or planning to become pregnant? No    Are you currently or planning to be nursing or breast feeding? No    Allergies   Allergen Reactions    Epinephrine     Mupirocin     Cephalosporins Rash       May we call your Preferred Phone number to discuss your specific medical information? YES    May we leave a detailed message that includes your specific medical information? YES    Have you traveled outside of the [facility] in the past 3 months? No    Do you currently have a pacemaker or defibrillator?  YES, Loop monitor    Do you have any artificial heart valves, joints, plates, screws, rods, stents, pins, etc? No   - If Yes, were any placed within the last 2 years? Do you require any medications prior to a surgical procedure? No     Are you taking any medications that cause you to bleed more easily ("blood thinners") No    Have you ever experienced a rapid heartbeat with epinephrine? [facility] can help with wrinkles, "laugh lines," facial volume loss, "double chin," "love handles," age spots, and more  Are you interested in learning today about some of the skin enhancement procedures that we offer? (If Yes, please provide more detail) No    Review of Systems:  Have you recently had or currently have any of the following? · Fever or chills: No  · Night Sweats: No  · Headaches: No  · Weight Gain: No  · Weight Loss: No  · Blurry Vision: No  · Nausea: No  · Vomiting: No  · Diarrhea: No  · Blood in Stool: No  · Abdominal Pain: No  · Itchy Skin: No  · Painful Joints: No  · Swollen Joints: No  · Muscle Pain: YES  · Irregular Mole: No  · Sun Burn: No  · Dry Skin: YES  · Skin Color Changes: No  · Scar or Keloid: No  · Cold Sores/Fever Blisters: No  · Bacterial Infections/MRSA: No  · Anxiety: No  · Depression: No  · Suicidal or Homicidal Thoughts: No      PSYCH: Normal mood and affect  EYES: Normal conjunctiva  ENT: Normal lips and oral mucosa  CARDIOVASCULAR: No edema  RESPIRATORY: Normal respirations  HEME/LYMPH/IMMUNO:  No regional lymphadenopathy except as noted below in ASSESSMENT AND PLAN BY DIAGNOSIS    FULL ORGAN SYSTEM SKIN EXAM (SKIN)   Hair, Scalp Normal except as noted below in Assessment       ALOPECIA/LICHEN PLANOPILARIS FOLLOW UP    Physical Exam:   Anatomic Location Affected:  Frontal and vertex scalp   Morphological Description:  Erythematous patches with alopecia,  early scarring noted just anterior to vertex, minimal to mild scale   Pertinent Positives:   Pertinent Negatives:     Additional History of Present Condition:  Insurance denied coverage for phototherapy  Phototherapy has been improving her symptoms with initial therapy paid out of pocket  Patient states her hair has stopped falling out  Patient is still using clobetasol  Assessment and Plan:  Based on a thorough discussion of this condition and the management approach to it (including a comprehensive discussion of the known risks, side effects and potential benefits of treatment), the patient (family) agrees to implement the following specific plan:  · Oral medication Dapsone, monitoring of blood count, isotretinoin, acitretin, monitor cholesterol and triglycerides, plaquenil require monitoring of the vision  ·  Finasteride, Propecia, dutasteride requires least amount of monitoring  · Active lab orders are in chart, can get lab work done today to start dapsone  · Patient would like to hold off on starting new medications until after receiving her 2nd COVID vaccine, schedule for Saturday  Will hold off one week, monitor symptoms, then we can start Dapsone    · Continue taking dutasteride  · Continue using clobetasol  · Appeal laser treatment    Scribe Attestation    I,:  Deana Dickey am acting as a scribe while in the presence of the attending physician :       I,:  Patsy Burrell MD personally performed the services described in this documentation    as scribed in my presence :

## 2021-02-12 ENCOUNTER — APPOINTMENT (OUTPATIENT)
Dept: LAB | Facility: CLINIC | Age: 81
End: 2021-02-12
Payer: COMMERCIAL

## 2021-02-12 LAB
ALBUMIN SERPL BCP-MCNC: 3.7 G/DL (ref 3.5–5)
ALP SERPL-CCNC: 71 U/L (ref 46–116)
ALT SERPL W P-5'-P-CCNC: 26 U/L (ref 12–78)
AST SERPL W P-5'-P-CCNC: 17 U/L (ref 5–45)
BASOPHILS # BLD AUTO: 0.08 THOUSANDS/ΜL (ref 0–0.1)
BASOPHILS NFR BLD AUTO: 1 % (ref 0–1)
BILIRUB DIRECT SERPL-MCNC: 0.18 MG/DL (ref 0–0.2)
BILIRUB SERPL-MCNC: 0.8 MG/DL (ref 0.2–1)
CHOLEST SERPL-MCNC: 209 MG/DL (ref 50–200)
EOSINOPHIL # BLD AUTO: 0.19 THOUSAND/ΜL (ref 0–0.61)
EOSINOPHIL NFR BLD AUTO: 3 % (ref 0–6)
ERYTHROCYTE [DISTWIDTH] IN BLOOD BY AUTOMATED COUNT: 13.3 % (ref 11.6–15.1)
HCT VFR BLD AUTO: 38.6 % (ref 34.8–46.1)
HGB BLD-MCNC: 12.6 G/DL (ref 11.5–15.4)
IMM GRANULOCYTES # BLD AUTO: 0.02 THOUSAND/UL (ref 0–0.2)
IMM GRANULOCYTES NFR BLD AUTO: 0 % (ref 0–2)
LYMPHOCYTES # BLD AUTO: 1.9 THOUSANDS/ΜL (ref 0.6–4.47)
LYMPHOCYTES NFR BLD AUTO: 30 % (ref 14–44)
MCH RBC QN AUTO: 32.5 PG (ref 26.8–34.3)
MCHC RBC AUTO-ENTMCNC: 32.6 G/DL (ref 31.4–37.4)
MCV RBC AUTO: 100 FL (ref 82–98)
MONOCYTES # BLD AUTO: 0.58 THOUSAND/ΜL (ref 0.17–1.22)
MONOCYTES NFR BLD AUTO: 9 % (ref 4–12)
NEUTROPHILS # BLD AUTO: 3.5 THOUSANDS/ΜL (ref 1.85–7.62)
NEUTS SEG NFR BLD AUTO: 57 % (ref 43–75)
NRBC BLD AUTO-RTO: 0 /100 WBCS
PLATELET # BLD AUTO: 328 THOUSANDS/UL (ref 149–390)
PMV BLD AUTO: 10.4 FL (ref 8.9–12.7)
PROT SERPL-MCNC: 7.3 G/DL (ref 6.4–8.2)
RBC # BLD AUTO: 3.88 MILLION/UL (ref 3.81–5.12)
TRIGL SERPL-MCNC: 105 MG/DL
WBC # BLD AUTO: 6.27 THOUSAND/UL (ref 4.31–10.16)

## 2021-02-12 PROCEDURE — 84478 ASSAY OF TRIGLYCERIDES: CPT | Performed by: DERMATOLOGY

## 2021-02-12 PROCEDURE — 36415 COLL VENOUS BLD VENIPUNCTURE: CPT | Performed by: DERMATOLOGY

## 2021-02-12 PROCEDURE — 82465 ASSAY BLD/SERUM CHOLESTEROL: CPT | Performed by: DERMATOLOGY

## 2021-02-12 PROCEDURE — 82955 ASSAY OF G6PD ENZYME: CPT | Performed by: DERMATOLOGY

## 2021-02-12 PROCEDURE — 85025 COMPLETE CBC W/AUTO DIFF WBC: CPT | Performed by: DERMATOLOGY

## 2021-02-12 PROCEDURE — 80076 HEPATIC FUNCTION PANEL: CPT | Performed by: DERMATOLOGY

## 2021-02-15 LAB
G6PD BLD QN: 273 U/10E12 RBC (ref 127–427)
RBC # BLD AUTO: 3.92 X10E6/UL (ref 3.77–5.28)

## 2021-02-16 ENCOUNTER — TELEPHONE (OUTPATIENT)
Dept: DERMATOLOGY | Facility: CLINIC | Age: 81
End: 2021-02-16

## 2021-02-16 DIAGNOSIS — L66.1 LICHEN PLANOPILARIS: Primary | ICD-10-CM

## 2021-02-16 RX ORDER — DAPSONE 25 MG/1
50 TABLET ORAL DAILY
Qty: 60 TABLET | Refills: 0 | Status: SHIPPED | OUTPATIENT
Start: 2021-02-16 | End: 2021-03-18

## 2021-02-16 NOTE — TELEPHONE ENCOUNTER
Pt was called and made aware  She asked to be put back on the schedule for tomorrow  She was told it will cost $191 out of pocket for the treatment

## 2021-02-16 NOTE — TELEPHONE ENCOUNTER
Called and explained that since her insurance isn't paying for the laser treatments we can't continue them (you can take them out of the schedule, too)    She is to continue with the dutasteride for now

## 2021-02-16 NOTE — TELEPHONE ENCOUNTER
Pt called wanting to know if she should continue use of dutasteride and if she should do her scalp treatment tomorrow please advise

## 2021-02-16 NOTE — TELEPHONE ENCOUNTER
Labs received, wnl, starting dapsone at 50 daily  Pt called and informed and instructed tor repeat labs in 2 weeks  Script sent and labs ordered  If well tolerated will increase dose as needed

## 2021-02-17 ENCOUNTER — OFFICE VISIT (OUTPATIENT)
Dept: DERMATOLOGY | Facility: CLINIC | Age: 81
End: 2021-02-17
Payer: COMMERCIAL

## 2021-02-17 DIAGNOSIS — L66.1 LICHEN PLANOPILARIS: Primary | ICD-10-CM

## 2021-02-17 PROCEDURE — 96920 EXCIMER LSR PSRIASIS<250SQCM: CPT | Performed by: DERMATOLOGY

## 2021-02-17 NOTE — PROGRESS NOTES
PHOTOTHERAPY EXCIMER LASER TREATMENT NURSE VISIT    Physical Exam   Diagnosis: Lichen planopilaris   Anatomic location affected:    Area 1:Scalp  o Color: Light Pink  o Sensitivity/Pain:None  o Tempeture/Heat:None  o Increase %: 25  o Mjoules: 990     Total Dosed Cm²: 46    Plan:   Treatment Schedule: Once weekly   Topical Applied: yes - mineral oil   Is it a Combination treatment?  No        Based on a thorough discussion of this condition and the management approach to it (including a comprehensive discussion of the known risks, side effects and potential benefits of treatment), the patient (family) agrees to implement the following specific plan:    Patient wore appropriate eye protection   All questions were answered no complications reported   Patient's next scheduled appointment: 2/24/21

## 2021-02-22 ENCOUNTER — TELEPHONE (OUTPATIENT)
Dept: DERMATOLOGY | Facility: CLINIC | Age: 81
End: 2021-02-22

## 2021-02-22 NOTE — TELEPHONE ENCOUNTER
Spoke to patient she noted that after taking dapsone 25 mg bid she noticed that her face was red and swollen after she was done shoveling snow  She has stop the medication  As per patient she has decided to pay out of pocket and is getting the laser treatment for her scalp  Patient wants to know if there are any other recommendations?  Please advise

## 2021-02-24 ENCOUNTER — OFFICE VISIT (OUTPATIENT)
Dept: DERMATOLOGY | Facility: CLINIC | Age: 81
End: 2021-02-24
Payer: COMMERCIAL

## 2021-02-24 DIAGNOSIS — L66.1 LICHEN PLANOPILARIS: Primary | ICD-10-CM

## 2021-02-24 PROCEDURE — 96920 EXCIMER LSR PSRIASIS<250SQCM: CPT | Performed by: DERMATOLOGY

## 2021-02-24 NOTE — PROGRESS NOTES
PHOTOTHERAPY EXCIMER LASER TREATMENT NURSE VISIT    Physical Exam   Diagnosis: Lichen planopilaris    Anatomic location affected:    Area 1:Scalp  o Color: Light Pink  o Sensitivity/Pain:None  o Tempeture/Heat:None  o Increase %: 25  o Mjoules: 1240     Total Dosed Cm²: 49    Plan:   Treatment Schedule: Once weekly    Topical Applied: yes - mineral oil   Is it a Combination treatment?  No        Based on a thorough discussion of this condition and the management approach to it (including a comprehensive discussion of the known risks, side effects and potential benefits of treatment), the patient (family) agrees to implement the following specific plan:    Patient wore appropriate eye protection   All questions were answered no complications reported   Patient's next scheduled appointment: 03/02/2021

## 2021-02-25 ENCOUNTER — TELEPHONE (OUTPATIENT)
Dept: DERMATOLOGY | Facility: CLINIC | Age: 81
End: 2021-02-25

## 2021-02-25 NOTE — TELEPHONE ENCOUNTER
Called and left voice mail  Options left for treating LPP would be plaquenil, acitretin or accutane  Pros and cons reviewed    Also could consider cosentyx but unlikely to be covered by insurance

## 2021-02-26 NOTE — TELEPHONE ENCOUNTER
Discussed options in detail  Patient with allergic reaction to dapsone, no improvement with topical clobetasol or minocycline  Afraid to take plaquenil because already has eye issues  Improving with laser  Plans to complete 12 once weekly treatments and pay out of pocket  I will check her progress after the 12th treatment  Consider adding MTX either during or after laser treatment    Prefers that over accutane (daughter took this as teen)

## 2021-03-02 ENCOUNTER — OFFICE VISIT (OUTPATIENT)
Dept: DERMATOLOGY | Facility: CLINIC | Age: 81
End: 2021-03-02
Payer: COMMERCIAL

## 2021-03-02 DIAGNOSIS — L66.1 LICHEN PLANOPILARIS: Primary | ICD-10-CM

## 2021-03-02 PROCEDURE — 96920 EXCIMER LSR PSRIASIS<250SQCM: CPT | Performed by: DERMATOLOGY

## 2021-03-02 NOTE — PROGRESS NOTES
PHOTOTHERAPY EXCIMER LASER TREATMENT NURSE VISIT    Physical Exam   Diagnosis: Lichen planopilaris    Anatomic location affected:    Area 1:Scalp  o Color: Light Pink  o Sensitivity/Pain:None  o Tempeture/Heat:None  o Increase %: 25  o Mjoules: 1155     Total Dosed Cm²: 72    Plan:   Treatment Schedule: Once weekly   Topical Applied: yes - mineral oil   Is it a Combination treatment?  No        Based on a thorough discussion of this condition and the management approach to it (including a comprehensive discussion of the known risks, side effects and potential benefits of treatment), the patient (family) agrees to implement the following specific plan:    Patient wore appropriate eye protection   All questions were answered no complications reported   Patient's next scheduled appointment: 3/9/21

## 2021-03-03 DIAGNOSIS — L66.1 LICHEN PLANOPILARIS: Primary | ICD-10-CM

## 2021-03-03 RX ORDER — FOLIC ACID 1 MG/1
TABLET ORAL
Qty: 90 TABLET | Refills: 2 | Status: SHIPPED | OUTPATIENT
Start: 2021-03-03 | End: 2022-04-18

## 2021-03-03 NOTE — PROGRESS NOTES
Patient has decided to start methotrexate  Script sent  Take 3 tabs at once week one, repeat labs and wait for result before taking second dose  Take folic acid daily

## 2021-03-11 ENCOUNTER — OFFICE VISIT (OUTPATIENT)
Dept: DERMATOLOGY | Facility: CLINIC | Age: 81
End: 2021-03-11
Payer: COMMERCIAL

## 2021-03-11 ENCOUNTER — TELEPHONE (OUTPATIENT)
Dept: DERMATOLOGY | Facility: CLINIC | Age: 81
End: 2021-03-11

## 2021-03-11 DIAGNOSIS — L66.1 LICHEN PLANOPILARIS: Primary | ICD-10-CM

## 2021-03-11 PROCEDURE — 96900 ACTINOTHERAPY UV LIGHT: CPT | Performed by: DERMATOLOGY

## 2021-03-11 NOTE — TELEPHONE ENCOUNTER
Hi Dr Becki Anglin,    I just wanted to inform you that [facility] wants to hold off on starting the methotrexate for now due to being worried about the side effects  She wants to see how the excimer laser will go first  Thanks!

## 2021-03-11 NOTE — PROGRESS NOTES
PHOTOTHERAPY EXCIMER LASER TREATMENT NURSE VISIT    Physical Exam   Diagnosis: Lichen planopilaris    Anatomic location affected:    Area 1:Scalp   o Color: Light Pink  o Sensitivity/Pain:None  o Tempeture/Heat:None  o Increase %: kept the same due to patient missing one week of treatment   o Mjoules: 1550     Total Dosed Cm²: 52    Plan:   Treatment Schedule: Once weekly    Topical Applied: yes - mineral oil   Is it a Combination treatment?  No        Based on a thorough discussion of this condition and the management approach to it (including a comprehensive discussion of the known risks, side effects and potential benefits of treatment), the patient (family) agrees to implement the following specific plan:    Patient wore appropriate eye protection   All questions were answered no complications reported   Patient's next scheduled appointment: 03/15/2021

## 2021-03-16 ENCOUNTER — OFFICE VISIT (OUTPATIENT)
Dept: DERMATOLOGY | Facility: CLINIC | Age: 81
End: 2021-03-16
Payer: COMMERCIAL

## 2021-03-16 DIAGNOSIS — L66.1 LICHEN PLANOPILARIS: Primary | ICD-10-CM

## 2021-03-16 PROCEDURE — 96900 ACTINOTHERAPY UV LIGHT: CPT | Performed by: STUDENT IN AN ORGANIZED HEALTH CARE EDUCATION/TRAINING PROGRAM

## 2021-03-16 NOTE — PROGRESS NOTES
PHOTOTHERAPY EXCIMER LASER TREATMENT NURSE VISIT    Physical Exam   Diagnosis: Lichen planopilaris    Anatomic location affected:    Area 1:Scalp  o Color: Light Pink  o Sensitivity/Pain:None  o Tempeture/Heat:None  o Increase %: 25  o Mjoules: 1938       Total Dosed Cm²: 66    Plan:   Treatment Schedule: Once weekly   Topical Applied: yes - mineral oil   Is it a Combination treatment?  No        Based on a thorough discussion of this condition and the management approach to it (including a comprehensive discussion of the known risks, side effects and potential benefits of treatment), the patient (family) agrees to implement the following specific plan:    Patient wore appropriate eye protection   All questions were answered no complications reported   Patient's next scheduled appointment: 3/23/21

## 2021-03-17 ENCOUNTER — TELEPHONE (OUTPATIENT)
Dept: DERMATOLOGY | Facility: CLINIC | Age: 81
End: 2021-03-17

## 2021-03-17 NOTE — TELEPHONE ENCOUNTER
Patient called stating she received a bill even after Dr John Cárdenas said she would get the payment appealed   I stated she could call the billing department for further details but she would like to talk to the doctor instead

## 2021-03-18 NOTE — TELEPHONE ENCOUNTER
Is it possible we can bill this patient at a slightly lower rate and not send through her insurance? She is paying for laser treatment out of pocket because her insurance won't cover it even though we've appealed it twice

## 2021-03-23 ENCOUNTER — OFFICE VISIT (OUTPATIENT)
Dept: DERMATOLOGY | Facility: CLINIC | Age: 81
End: 2021-03-23
Payer: COMMERCIAL

## 2021-03-23 DIAGNOSIS — L66.1 LICHEN PLANOPILARIS: Primary | ICD-10-CM

## 2021-03-23 PROCEDURE — 96900 ACTINOTHERAPY UV LIGHT: CPT | Performed by: STUDENT IN AN ORGANIZED HEALTH CARE EDUCATION/TRAINING PROGRAM

## 2021-03-23 NOTE — PROGRESS NOTES
PHOTOTHERAPY EXCIMER LASER TREATMENT NURSE VISIT    Physical Exam   Diagnosis: Lichen planopilaris    Anatomic location affected:    Area 1:Scalp  o Color: Light Pink  o Sensitivity/Pain:None  o Tempeture/Heat:None  o Increase %: Kept the same as last time since last treatment was 1 week ago   o Mjeleonora: 1938     Total Dosed Cm²: 52    Plan:   Treatment Schedule: Once weekly    Topical Applied: yes - mineral oil (applied by nurse)   Wili Flower Is it a Combination treatment?  No        Based on a thorough discussion of this condition and the management approach to it (including a comprehensive discussion of the known risks, side effects and potential benefits of treatment), the patient (family) agrees to implement the following specific plan:    Patient wore appropriate eye protection   All questions were answered no complications reported   Patient's next scheduled appointment: 03/30/2021

## 2021-03-30 ENCOUNTER — OFFICE VISIT (OUTPATIENT)
Dept: DERMATOLOGY | Facility: CLINIC | Age: 81
End: 2021-03-30
Payer: COMMERCIAL

## 2021-03-30 DIAGNOSIS — L66.1 LICHEN PLANOPILARIS: Primary | ICD-10-CM

## 2021-03-30 PROCEDURE — 96910 PHOTCHMTX TAR&UVB/PTRLTM&UVB: CPT | Performed by: DERMATOLOGY

## 2021-03-30 NOTE — PROGRESS NOTES
PHOTOTHERAPY EXCIMER LASER TREATMENT NURSE VISIT    Physical Exam   Diagnosis: Lichen planopilaris   Anatomic location affected:    Area 1:Scalp  o Color: Light Pink  o Sensitivity/Pain:None  o Tempeture/Heat:None  o Increase %: 25  o Mjoules: 5004     Total Dosed Cm²: 72    Plan:   Treatment Schedule: Once weekly   Topical Applied: yes - mineral oil applied by Nurse  Tom Trevino Is it a Combination treatment?  No        Based on a thorough discussion of this condition and the management approach to it (including a comprehensive discussion of the known risks, side effects and potential benefits of treatment), the patient (family) agrees to implement the following specific plan:    Patient wore appropriate eye protection   All questions were answered no complications reported   Patient's next scheduled appointment: 4/6/21

## 2021-03-31 ENCOUNTER — TELEPHONE (OUTPATIENT)
Dept: DERMATOLOGY | Facility: CLINIC | Age: 81
End: 2021-03-31

## 2021-03-31 DIAGNOSIS — L66.1 LICHEN PLANOPILARIS: ICD-10-CM

## 2021-03-31 RX ORDER — DUTASTERIDE 0.5 MG/1
0.5 CAPSULE, LIQUID FILLED ORAL DAILY
Qty: 90 CAPSULE | Refills: 2 | Status: SHIPPED | OUTPATIENT
Start: 2021-03-31 | End: 2021-12-16

## 2021-03-31 NOTE — TELEPHONE ENCOUNTER
Pt called in stating she has completed her dutasteride and wants to know if she should get a refill or if she is completed with the Rx  Please give her a call back

## 2021-04-06 ENCOUNTER — OFFICE VISIT (OUTPATIENT)
Dept: DERMATOLOGY | Facility: CLINIC | Age: 81
End: 2021-04-06
Payer: COMMERCIAL

## 2021-04-06 DIAGNOSIS — L66.1 LICHEN PLANOPILARIS: Primary | ICD-10-CM

## 2021-04-06 PROCEDURE — 96910 PHOTCHMTX TAR&UVB/PTRLTM&UVB: CPT | Performed by: STUDENT IN AN ORGANIZED HEALTH CARE EDUCATION/TRAINING PROGRAM

## 2021-04-06 NOTE — PROGRESS NOTES
PHOTOTHERAPY EXCIMER LASER TREATMENT NURSE VISIT    Physical Exam   Diagnosis: Lichen planopilaris    Anatomic location affected:    Area 1:Scalp   o Color: Light Pink  o Sensitivity/Pain:None  o Tempeture/Heat:None  o Increase %: 20  o Mjoules: 8370     Total Dosed Cm²: 59    Plan:   Treatment Schedule: Once weekly    Topical Applied: yes - mineral oil applied by Nurse  Cami Patel Is it a Combination treatment?  No        Based on a thorough discussion of this condition and the management approach to it (including a comprehensive discussion of the known risks, side effects and potential benefits of treatment), the patient (family) agrees to implement the following specific plan:    Patient wore appropriate eye protection   All questions were answered no complications reported   Patient's next scheduled appointment: TBD, patient has a follow up with Dr Marybeth Brown on 04/07/2021

## 2021-04-07 ENCOUNTER — OFFICE VISIT (OUTPATIENT)
Dept: DERMATOLOGY | Age: 81
End: 2021-04-07
Payer: COMMERCIAL

## 2021-04-07 DIAGNOSIS — L66.1 LICHEN PLANOPILARIS: ICD-10-CM

## 2021-04-07 DIAGNOSIS — L82.0 SEBORRHEIC KERATOSIS, INFLAMED: ICD-10-CM

## 2021-04-07 DIAGNOSIS — L23.9 ALLERGIC CONTACT DERMATITIS, UNSPECIFIED TRIGGER: Primary | ICD-10-CM

## 2021-04-07 PROCEDURE — 99214 OFFICE O/P EST MOD 30 MIN: CPT | Performed by: DERMATOLOGY

## 2021-04-07 PROCEDURE — 17111 DESTRUCTION B9 LESIONS 15/>: CPT | Performed by: DERMATOLOGY

## 2021-04-07 NOTE — PROGRESS NOTES
[facility] Follow Up Note    Patient Name: Estefany Sheikh  Encounter Date: 04/07/2021     Today's Chief Concerns:  Júnior Thomas Concern #1:  Follow up planopilaris      Current Medications:    Current Outpatient Medications:     clobetasol (TEMOVATE) 0 05 % cream, Apply topically 2 (two) times a day, Disp: , Rfl:     clobetasol (TEMOVATE) 0 05 % external solution, Apply topically 2 (two) times a day, Disp: 50 mL, Rfl: 3    cycloSPORINE (RESTASIS) 0 05 % ophthalmic emulsion, 1 drop by Tube route 2 (two) times a day, Disp: , Rfl:     dutasteride (AVODART) 0 5 mg capsule, Take 1 capsule (0 5 mg total) by mouth daily, Disp: 90 capsule, Rfl: 2    estradiol (ESTRACE) 0 1 mg/g vaginal cream, Insert into the vagina 2 (two) times a week, Disp: , Rfl:     folic acid (FOLVITE) 1 mg tablet, Take one tablet daily while on methotrexate to prevent side effects, Disp: 90 tablet, Rfl: 2    Ivermectin (Soolantra) 1 % CREA, Apply topically 1-2 times a day to face area as needed for rosacea  (Patient not taking: Reported on 1/25/2021), Disp: 45 g, Rfl: 5    Lutein 40 MG CAPS, Take by mouth daily, Disp: , Rfl:     methotrexate 2 5 mg tablet, Take 3 tablets at once week one, repeat lab work and wait for results for taking second dose week 2, Disp: 3 tablet, Rfl: 1    metoprolol succinate (TOPROL-XL) 50 mg 24 hr tablet, Take 50 mg by mouth daily, Disp: , Rfl:     Multiple Vitamin-Folic Acid TABS, Take by mouth, Disp: , Rfl:     CONSTITUTIONAL:   There were no vitals filed for this visit  Specific Alerts:    Have you been seen by a [facility] in the last 3 years? YES    Are you pregnant or planning to become pregnant? No    Are you currently or planning to be nursing or breast feeding? No    Allergies   Allergen Reactions    Dapsone Facial Swelling    Epinephrine     Mupirocin     Cephalosporins Rash       May we call your Preferred Phone number to discuss your specific medical information? YES    May we leave a detailed message that includes your specific medical information? YES    Have you traveled outside of the [facility] in the past 3 months? No        Review of Systems:  Have you recently had or currently have any of the following? · Fever or chills: No  · Night Sweats: No  · Headaches: No  · Weight Gain: No  · Weight Loss: No  · Blurry Vision: No  · Nausea: No  · Vomiting: No  · Diarrhea: No  · Blood in Stool: No  · Abdominal Pain: No  · Itchy Skin: No  · Painful Joints: No  · Swollen Joints: No  · Muscle Pain: No  · Irregular Mole: No  · Sun Burn: No  · Dry Skin: No  · Skin Color Changes: No  · Scar or Keloid: No  · Cold Sores/Fever Blisters: No  · Bacterial Infections/MRSA: No  · Anxiety: No  · Depression: No  · Suicidal or Homicidal Thoughts: No      PSYCH: Normal mood and affect  EYES: Normal conjunctiva  ENT: Normal lips and oral mucosa  CARDIOVASCULAR: No edema  RESPIRATORY: Normal respirations  HEME/LYMPH/IMMUNO:  No regional lymphadenopathy except as noted below in ASSESSMENT AND PLAN BY DIAGNOSIS    FULL ORGAN SYSTEM SKIN EXAM (SKIN)   Scalp, face Normal except as noted below in Assessment   Neck, Cervical Chain Nodes    Right Arm/Hand/Fingers    Left Arm/Hand/Fingers    Chest/Breasts/Axillae    Abdomen, Umbilicus Normal except as noted below in Assessment   Back/Spine Normal except as noted below in Assessment   Groin/Genitalia/Buttocks    Right Leg, Foot, Toes    Left Leg, Foot, Toes           ALOPECIA/LICHEN PLANOPILARIS FOLLOW UP     Physical Exam:  · Anatomic Location Affected:  Frontal and vertex scalp, New patches on right occipital scalp   · Morphological Description:  Erythematous patches with alopecia, improved scarring, new hair growth and decreased scale  · Pertinent Positives:  · Pertinent Negatives:     Additional History of Present Condition:  Patient states she noticed her occipital scalp with itchy areas    Patient states phototherapy did keep it under control  Patient states she hasn't started the methotrexate due to some concerning side effects  Patient has had a total of 16 treatments with the excimer laser       Assessment and Plan: The patient has been paying out of pocket for laser treatment and the treated area is improved  There is hair growth noted, scale is resolving, scarring is less prominent and she is symptomatically improved  Unfortunately she had an allergic reaction to dapsone, can not take plaquenil because of her retinal problems, and did not improve with clobetasol, dutasteride or minoxidil  The best option for this patient would be to continue laser treatment but she can not afford further treatments  Based on a thorough discussion of this condition and the management approach to it (including a comprehensive discussion of the known risks, side effects and potential benefits of treatment), the patient (family) agrees to implement the following specific plan:  · Continue dutasteride daily capsule   · Discussed plaquenil but should refrain from starting due to current retinal issues  · Discussed Accutane but has the possibility of hair loss   · Start the methotrexate 2 5 mg take 1 tablet after breakfast, 1 after lunch and 1 with dinner  · Repeat blood work 1 week after starting methotrexate; if blood work is normal then we can increase dose  · Can continue the excimer laser but ok to stop if its too expensive to pay; will try to re-do the prior authorization      2  ALLERGIC CONTACT DERMATITIS    Physical Exam:   Anatomic Location Affected:  bilateral     Morphological Description:  Erythematous    Severity: mild   Pertinent Positives:   Pertinent Negatives: Additional History of Present Condition:  Patient states she noticed red puffy areas near her eye in the last 3 weeks       Assessment and Plan:  Based on a thorough discussion of this condition and the management approach to it (including a comprehensive discussion of the known risks, side effects and potential benefits of treatment), the patient (family) agrees to implement the following specific plan:   Start hydrocortisone 2 5% cream apply topically twice a day to affected areas on face for 2 weeks       Allergic Contact Dermatitis is a delayed hypersensitivity reaction that can require 10-14 days after initial exposure to an allergen  The reaction may occur within 48-72 hours during subsequent exposures  The mechanism involved activation of CD4+ T-lymphocytes which recognize an antigen on the skin surface with immune response  Some common allergens include nickel, poison ivy, acrylates in nail cosmetics and topical antibiotics  In some cases, patients may have been in contact with the allergen for years before developing allergic contact dermatitis  Similar to irritant contact dermatitis, patients with atopic dermatitis are at higher risk  The main symptom of ACD is itching and scaly edematous plaques with some blistering in areas of exposure  Other characteristics include:   "Rhus" contact dermatitis from poison ivy can cause swelling and redness on the face in children as they play lower to the ground  It is often mistaken for cellulitis, but is differentiated by primarily itchiness without pain   Children can get ACD in their groin and buttocks due to diapering and toilet training  Baby wipes may contain chemicals or fragrances that can irritate the skin  Toilet seat cleansers can also be a culprit    The diagnosis of contact dermatitis can usually be identified after a thorough history and physical examination  Some signs to look out for include:   The rash is eczematous with surface changes such as blistering, dryness, peeling, and reness   The rash occurs in areas in contact with the suspected allergen    Systemic contact dermatitis may follow ingestion of substances that previously caused allergic contact dermatitis   The rash is symmetrical and often affects the inner elbows   Patch testing can be used to determine potential contact allergens in severe or persistent cases of contact dermatitis   Open application testing can also be used to confirm a contact allergy to cosmetics and moisturizers    The most important part of treatment is to avoid skin contact with the offending agent  Other steps you can take include:   Wear personal protective equipment like gloves and gowns when working with potentially irritating substances   Use less soap for hand washing throughout the day  Consider using hand  instead    Most patients will only need minor supportive care in the form of topical steroids for localized involvement and topical or oral antihistamines   Oatmeal soaks and calamine lotion may help soothe open erosions from scratching or blistering   Wet dressings are helpful if there is extensive oozing and crusting   In more severe cases, we can use oral prednisone for 2-3 weeks  It is important to take the steroid for the entire time period as short bursts may cause relapse   If allergic contact dermatitis is due to rhus dermatitis on the face from poison ivy, topical desonide can be used for 1-2 weeks  Also be sure to thoroughly clean any clothing items that may have come into contact with the allergen as it may cause further skin reactions   Phototherapy and immunosuppressive agents such as methotrexate, ciclosporin, or azathioprine may also be prescribed    3  SEBORRHEIC KERATOSIS; INFLAMED    Physical Exam:   Anatomic Location Affected:  Mid back and right posterior ear     Morphological Description:  Flat and raised, waxy, smooth to warty textured, yellow to brownish-grey to dark brown to blackish, discrete, "stuck-on" appearing papules   Pertinent Positives:   Pertinent Negatives: Additional History of Present Condition:  Patient reports new bumps on the skin on the back with itching and irritation       Assessment and Plan:  Based on a thorough discussion of this condition and the management approach to it (including a comprehensive discussion of the known risks, side effects and potential benefits of treatment), the patient (family) agrees to implement the following specific plan:   cyrotherapy today     Seborrheic Keratosis  A seborrheic keratosis is a harmless warty spot that appears during adult life as a common sign of skin aging  Seborrheic keratoses can arise on any area of skin, covered or uncovered, with the usual exception of the palms and soles  They do not arise from mucous membranes  Seborrheic keratoses can have highly variable appearance  Seborrheic keratoses are extremely common  It has been estimated that over 90% of adults over the age of 61 years have one or more of them  They occur in males and females of all races, typically beginning to erupt in the 35s or 45s  They are uncommon under the age of 21 years  The precise cause of seborrhoeic keratoses is not known  Seborrhoeic keratoses are considered degenerative in nature  As time goes by, seborrheic keratoses tend to become more numerous  Some people inherit a tendency to develop a very large number of them; some people may have hundreds of them  The name "seborrheic keratosis" is misleading, because these lesions are not limited to a seborrhoeic distribution (scalp, mid-face, chest, upper back), nor are they formed from sebaceous glands, nor are they associated with sebum -- which is greasy    Seborrheic keratosis may also be called "SK," "Seb K," "basal cell papilloma," "senile wart," or "barnacle "      Researchers have noted:   Eruptive seborrhoeic keratoses can follow sunburn or dermatitis   Skin friction may be the reason they appear in body folds   Viral cause (e g , human papillomavirus) seems unlikely   Stable and clonal mutations or activation of FRFR3, PIK3CA, ANDREEA, AKT1 and EGFR genes are found in seborrhoeic keratoses   Seborrhoeic keratosis can arise from solar lentigo   FRFR3 mutations also arise in solar lentigines  These mutations are associated with increased age and location on the head and neck, suggesting a role of ultraviolet radiation in these lesions   Seborrheic keratoses do not harbour tumour suppressor gene mutations   Epidermal growth factor receptor inhibitors, which are used to treat some cancers, often result in an increase in verrucal (warty) keratoses  There is no easy way to remove multiple lesions on a single occasion  Unless a specific lesion is "inflamed" and is causing pain or stinging/burning or is bleeding, most insurance companies do not authorize treatment  PROCEDURE:  DESTRUCTION OF BENIGN LESIONS  After a thorough discussion of treatment options and risk/benefits/alternatives (including but not limited to local pain, scarring, dyspigmentation, blistering, and possible superinfection), verbal and written consent were obtained and the aforementioned lesions were treated on with cryotherapy using liquid nitrogen x 1 cycle for 5-10 seconds   TOTAL NUMBER of 21 lesions were treated today on the ANATOMIC LOCATION: mid back and right posterior ear   The patient tolerated the procedure well, and after-care instructions were provided      Scribe Attestation    I,:  SunFunder am acting as a scribe while in the presence of the attending physician :       I,:  Valery Miller MD personally performed the services described in this documentation    as scribed in my presence :

## 2021-04-07 NOTE — PATIENT INSTRUCTIONS
Based on a thorough discussion of this condition and the management approach to it (including a comprehensive discussion of the known risks, side effects and potential benefits of treatment), the patient (family) agrees to implement the following specific plan:   Start hydrocortisone 2 5% cream apply topically twice a day to affected areas on face for 2 weeks       Allergic Contact Dermatitis is a delayed hypersensitivity reaction that can require 10-14 days after initial exposure to an allergen  The reaction may occur within 48-72 hours during subsequent exposures  The mechanism involved activation of CD4+ T-lymphocytes which recognize an antigen on the skin surface with immune response  Some common allergens include nickel, poison ivy, acrylates in nail cosmetics and topical antibiotics  In some cases, patients may have been in contact with the allergen for years before developing allergic contact dermatitis  Similar to irritant contact dermatitis, patients with atopic dermatitis are at higher risk  The main symptom of ACD is itching and scaly edematous plaques with some blistering in areas of exposure  Other characteristics include:   "Rhus" contact dermatitis from poison ivy can cause swelling and redness on the face in children as they play lower to the ground  It is often mistaken for cellulitis, but is differentiated by primarily itchiness without pain   Children can get ACD in their groin and buttocks due to diapering and toilet training  Baby wipes may contain chemicals or fragrances that can irritate the skin  Toilet seat cleansers can also be a culprit    The diagnosis of contact dermatitis can usually be identified after a thorough history and physical examination   Some signs to look out for include:   The rash is eczematous with surface changes such as blistering, dryness, peeling, and reness   The rash occurs in areas in contact with the suspected allergen    Systemic contact dermatitis may follow ingestion of substances that previously caused allergic contact dermatitis  The rash is symmetrical and often affects the inner elbows   Patch testing can be used to determine potential contact allergens in severe or persistent cases of contact dermatitis   Open application testing can also be used to confirm a contact allergy to cosmetics and moisturizers    The most important part of treatment is to avoid skin contact with the offending agent  Other steps you can take include:   Wear personal protective equipment like gloves and gowns when working with potentially irritating substances   Use less soap for hand washing throughout the day  Consider using hand  instead    Most patients will only need minor supportive care in the form of topical steroids for localized involvement and topical or oral antihistamines   Oatmeal soaks and calamine lotion may help soothe open erosions from scratching or blistering   Wet dressings are helpful if there is extensive oozing and crusting   In more severe cases, we can use oral prednisone for 2-3 weeks  It is important to take the steroid for the entire time period as short bursts may cause relapse   If allergic contact dermatitis is due to rhus dermatitis on the face from poison ivy, topical desonide can be used for 1-2 weeks  Also be sure to thoroughly clean any clothing items that may have come into contact with the allergen as it may cause further skin reactions   Phototherapy and immunosuppressive agents such as methotrexate, ciclosporin, or azathioprine may also be prescribed  ALOPECIA/LICHEN PLANOPILARIS FOLLOW UP        Additional History of Present Condition:  Patient states she noticed her occipital scalp with itchy areas  Patient states phototherapy did keep it under control  Patient states she hasn't started the methotrexate due to some concerning side effects  Patient has had a total of 16 treatments with the excimer laser     Assessment and Plan:  Based on a thorough discussion of this condition and the management approach to it (including a comprehensive discussion of the known risks, side effects and potential benefits of treatment), the patient (family) agrees to implement the following specific plan:  · Continue dutasteride daily capsule   · Discussed plaquenil but should refrain from starting due to current retinal issues  · Discussed Accutane but has the possibility of hair loss   · Start the methotrexate 2 5 mg take 1 tablet after breakfast, 1 after lunch and 1 with dinner  · Repeat blood work 1 week after starting methotrexate; if blood work is normal then we can increase dose

## 2021-04-13 ENCOUNTER — TELEPHONE (OUTPATIENT)
Dept: DERMATOLOGY | Facility: CLINIC | Age: 81
End: 2021-04-13

## 2021-04-13 NOTE — TELEPHONE ENCOUNTER
Telephone call to pt regarding questions pt has about how she was billed for excimer laser treatments  Pt is requesting a list of each excimer laser treatment she has paid out of pocket for  She states she has the multiple receipts from each laser treatment but she would like one piece of paper listing each one individually  I told her I will try my best to figure it out within Epic or ask my manager for assistance with this  Also, she states she is still being charged for the four excimer laser visits that she had back in January where we billed her insurance and they all got denied  I advised that I will reach out to my manager to see if billing can switch those visits to self pay or how exactly this can be resolved if it can be  Pt also brought up [facility] at last visit recommended starting a prior auth for a home phototherapy wand unit  I told her [facility] is currently working on this as it could take a couple months to process  Pt also wants a recommendation from Dr Tiera Horowitz on a good laser hair growth device that she can buy over the counter  I will reach out to Dr Tiera Horowitz in regards to this  Pt would like to try all options before self paying for excimer treatments in our office

## 2021-04-15 NOTE — TELEPHONE ENCOUNTER
Forms for [employer] was filled out and all records are attached  Awaiting Dr Bullock signature before faxing forms to them

## 2021-04-20 LAB
BASOPHILS # BLD AUTO: 0.1 X10E3/UL (ref 0–0.2)
BASOPHILS NFR BLD AUTO: 1 %
EOSINOPHIL # BLD AUTO: 0.1 X10E3/UL (ref 0–0.4)
EOSINOPHIL NFR BLD AUTO: 2 %
ERYTHROCYTE [DISTWIDTH] IN BLOOD BY AUTOMATED COUNT: 12.2 % (ref 11.7–15.4)
HCT VFR BLD AUTO: 36.6 % (ref 34–46.6)
HGB BLD-MCNC: 12.2 G/DL (ref 11.1–15.9)
IMM GRANULOCYTES # BLD: 0 X10E3/UL (ref 0–0.1)
IMM GRANULOCYTES NFR BLD: 0 %
LYMPHOCYTES # BLD AUTO: 1.8 X10E3/UL (ref 0.7–3.1)
LYMPHOCYTES NFR BLD AUTO: 23 %
MCH RBC QN AUTO: 33 PG (ref 26.6–33)
MCHC RBC AUTO-ENTMCNC: 33.3 G/DL (ref 31.5–35.7)
MCV RBC AUTO: 99 FL (ref 79–97)
MONOCYTES # BLD AUTO: 0.7 X10E3/UL (ref 0.1–0.9)
MONOCYTES NFR BLD AUTO: 9 %
NEUTROPHILS # BLD AUTO: 5.2 X10E3/UL (ref 1.4–7)
NEUTROPHILS NFR BLD AUTO: 65 %
PLATELET # BLD AUTO: 372 X10E3/UL (ref 150–450)
RBC # BLD AUTO: 3.7 X10E6/UL (ref 3.77–5.28)
WBC # BLD AUTO: 8 X10E3/UL (ref 3.4–10.8)

## 2021-04-22 DIAGNOSIS — L66.1 LICHEN PLANOPILARIS: ICD-10-CM

## 2021-04-22 NOTE — TELEPHONE ENCOUNTER
Pt called saying she got her lab work back and wants to know what does Dr Lew Monday want her to do as far as her medications  She asked for Dr Lew Monday to please give hear call  She was told a message would be sent to Dr Lew Monday making her aware

## 2021-04-22 NOTE — PROGRESS NOTES
Lab work received, CBC good  Called patient and discussed results  Instructed to continue increasing dose by 1 pill each week until 6 pills weekly is reached  Continue on 6 pills weekly for 3 months and then follow up in office    Call with any symptoms of fatigue, nausea, cough, diarrhea, fever, body aches    [facility] must be printed prior to leaving office

## 2021-04-23 NOTE — TELEPHONE ENCOUNTER
[employer] form along with records were sent via fax to [phone]  Will await determination

## 2021-04-27 DIAGNOSIS — L66.1 LICHEN PLANOPILARIS: ICD-10-CM

## 2021-04-27 RX ORDER — CLOBETASOL PROPIONATE 0.46 MG/ML
SOLUTION TOPICAL
Qty: 50 ML | Refills: 3 | Status: SHIPPED | OUTPATIENT
Start: 2021-04-27

## 2021-05-03 ENCOUNTER — TELEPHONE (OUTPATIENT)
Dept: DERMATOLOGY | Facility: CLINIC | Age: 81
End: 2021-05-03

## 2021-05-03 NOTE — TELEPHONE ENCOUNTER
Patient called stating she got an insect bite and had to get a steroid shot  She is worried it is going to affect the methotrexate 2 5 mg tablet   Patient would like a call back

## 2021-05-04 ENCOUNTER — TELEPHONE (OUTPATIENT)
Dept: DERMATOLOGY | Age: 81
End: 2021-05-04

## 2021-05-04 NOTE — TELEPHONE ENCOUNTER
Please call patient and let her know there is not a problem with her taking the prednisone and methotrexate together for a short time (less than 2 weeks)

## 2021-05-04 NOTE — LETTER
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) is not a covered diagnoses through her insurnace  I am requesting a redetermination of the denial due to the patient failing to improve with topical clobetasol and oral dutasteride  The condition causes scarring of the scalp and is progressive  It is medically necessary that she receive  treatment with photptherapy  to prevent further scarring  Patient did 16 excimer laser treatment in our office and had improvement with receiving that treatment        In conclusion, please reconsider the denial for my patient  I would appreciate prompt review of this Necessity and approval for the dermalume 2x  I can be reached at [phone] for additional information and discussion   Thank you       Sincerely,      Emmie Tierney MD

## 2021-05-04 NOTE — LETTER
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) is not a covered diagnoses through the insurance  I am requesting a redetermination of the denial due to the patient failing to improve with topical clobetasol and oral dutasteride  The condition causes scarring of the scalp and is progressive  It is medically necessary that she receive  treatment with photptherapy to prevent further scarring  Patient did 16 excimer laser treatment in our office and had improvement with receiving that treatment  Patient was paying for treatment out of pocket since excimer laser was also denied by insurance  Patient paid $180 each treatment which would cost her $12,960 for 1 year of treatmetn   Dermalume would cost $1,095 which is a cheaper option and patient would receive the best treatment option       In conclusion, please reconsider the denial for my patient  I would appreciate prompt review of this medical Necessity and approval for the dermalume 2x  I can be reached at [phone] for additional information and discussion   Thank you       Sincerely,      Otilia Wylie MD

## 2021-05-04 NOTE — TELEPHONE ENCOUNTER
Patient states that she has no heard anything back from [employer]  Please call and updated patient

## 2021-05-10 NOTE — TELEPHONE ENCOUNTER
Spoke with Rep from [employer] and they advised ICD 10 code was missing and is needed before obtaining authorization     Updated script with ICD code and refaxed

## 2021-05-19 NOTE — TELEPHONE ENCOUNTER
Spoke with rosas from [facility] and she status patient insurance will not cover it with the DX code of lichen plano-pilaris  Insurance will cover for common diagnosis of atopic dermatitis or psoriasis  Patient was given the option to pay for machine that would cost her $1,095 or cash price $137      Called patient left message on voicemail to return my call

## 2021-05-19 NOTE — TELEPHONE ENCOUNTER
Patient has called again regarding her phototherapy  She would like a call back at [phone] with an update

## 2021-05-19 NOTE — TELEPHONE ENCOUNTER
The pt was advised by her insurance company that a secondary DX is required to be covered for prior auth, please give a call to the pt to discuss, she wants to sw Dr Becki Anglin directly but I did advise her the [facility] may call first as the dr maybe with patients  Trini Gomez jd

## 2021-05-26 NOTE — TELEPHONE ENCOUNTER
Patient called and states she would like a medical necessity letter sent to [email or web address] so she can purchase machine  Fax#[phone]  Patient would also like a letter sent to her insurance company so she can start an appeal process  Patient would like a letter sent to her email

## 2021-05-26 NOTE — TELEPHONE ENCOUNTER
Pt called again as she has been waiting for a cb from [place], in regards to a letter of medical necessity for the insurance, please call her asap

## 2021-06-15 ENCOUNTER — TELEPHONE (OUTPATIENT)
Dept: DERMATOLOGY | Facility: CLINIC | Age: 81
End: 2021-06-15

## 2021-06-15 NOTE — TELEPHONE ENCOUNTER
Patient called and has some questions about methotrexate  Patient also states she finally received the phototherapy wand and wants to know how long her treatments should be and how to use it

## 2021-06-15 NOTE — TELEPHONE ENCOUNTER
Pt would like to speak with Antonio or Dr Regalado Roles about a medication that was recommended and about a device she needs to use

## 2021-06-15 NOTE — LETTER
Treatment plan for Dermalight 90    Starting dose will start at 5 minutes     Each treatment can be increased by 10 seconds as long as there no side effects like burning, blistering and pain     Each treatment should be done 24 hours apart     When doing treatment hair should be wet or can be done after the shower   You are to comb through the affected areas from left to right moving back to the scalp     If you develop any side effects please decrease your treatment time by a minute     *After a few treatments you may notice your flare ups are worse before they get better     You may want to keep a log to track when you treated and the treatment time     If you have any questions you can call [facility] or our office at [phone]        Thank you         [facility]

## 2021-06-16 NOTE — TELEPHONE ENCOUNTER
Same, I have only used the home light boxes, but usually they come preloaded with the starting dose and number of treatments  I would say she should use if for 2 months and then touch base with us  If the instructions aren't clear to her then we may need to call [employer] and see if there's a patient service to help her

## 2021-06-17 ENCOUNTER — TELEPHONE (OUTPATIENT)
Dept: DERMATOLOGY | Facility: CLINIC | Age: 81
End: 2021-06-17

## 2021-06-18 NOTE — TELEPHONE ENCOUNTER
Thursday, June 17, 2021 9:19 AM  Patient LM  She still has questions about the methotrexate and about the phototherapy home device

## 2021-06-18 NOTE — TELEPHONE ENCOUNTER
Patient had an injury on treadmill and was told to stop the methotrexate  Patient has been off the methotrexate for the last 2 weeks  Patient states she took it upon herself and treated herself for 55 seconds with the home therapy machine  Patient not sure how long she suppose to be  doing her treatment  Patient currently using the dermalmParticle 90  Spoke with Jhon Molina from [facility] she states machine has unlimited treatments  Patient is to wet hair before treating or after getting out the shower  First treatment should be 5 minutes  Patient should give her self 24 hours in between treatments  Patient can increase 10-20 seconds each treatment as long as she has no side effects like burning or blistering  Pinkness on the scalp is normal  Patient scalp may get worse at first but then it will improvement  Spoke with patient advised I would send the instruction to her to her email and verbally went over them with her

## 2021-06-22 NOTE — TELEPHONE ENCOUNTER
Called patient advised she can restart her methotrexate on whatever her last dose was  Patient states she was on 15 mg when she stopped  Patient to call office if she has any questions

## 2021-06-22 NOTE — TELEPHONE ENCOUNTER
Mon 6/21/2021 9:42 AM  Patient LM   She still has questions that are unanswered about how to take the methotrexate    Returned patient's call  Patient states she was off the methotrexate for 2 weeks due to an injury while taking antibiotics  She is trying to get back on the correct schedule with the methotrexate and does not know what does to take  She took 2 tablets this week but unsure if that is correct and how to increase the dose    Follow up appointment also scheduled with patient for 7/22

## 2021-06-22 NOTE — TELEPHONE ENCOUNTER
She can restart at what ever her last dose was, she does not need to taper up like she did when she was starting it

## 2021-07-13 NOTE — TELEPHONE ENCOUNTER
Spoke with patient states she spoke [employer] and they would like all the self pay claims be resubmitted through her insurance so she can get reimbursed for all those payments  Patient to call back with all the dates so I can submit info to my manager

## 2021-07-15 NOTE — TELEPHONE ENCOUNTER
Patient called and states the service dates of: February 17th ,February 24th ,March 2nd ,March 11th ,March 16th ,March 23rd ,March 30th and April 6th claims needs to be resubmitted through her insurance  Will reach out to the practice manager to see how this can be done

## 2021-07-20 NOTE — TELEPHONE ENCOUNTER
The billing office needs to resubmit all the visits to the insurance, there is nothing that we can do on our end, Zafar Edgar will need to request this

## 2021-07-20 NOTE — TELEPHONE ENCOUNTER
Mirian: Do you know how we can go about this? I already reached out to [facility] but haven't heard back

## 2021-07-22 ENCOUNTER — OFFICE VISIT (OUTPATIENT)
Dept: DERMATOLOGY | Age: 81
End: 2021-07-22
Payer: COMMERCIAL

## 2021-07-22 VITALS — HEIGHT: 64 IN | WEIGHT: 119.4 LBS | TEMPERATURE: 98.1 F | BODY MASS INDEX: 20.38 KG/M2

## 2021-07-22 DIAGNOSIS — L66.1 LICHEN PLANOPILARIS: Primary | ICD-10-CM

## 2021-07-22 DIAGNOSIS — L63.9 ALOPECIA AREATA: ICD-10-CM

## 2021-07-22 PROCEDURE — 11900 INJECT SKIN LESIONS </W 7: CPT | Performed by: DERMATOLOGY

## 2021-07-22 PROCEDURE — 99213 OFFICE O/P EST LOW 20 MIN: CPT | Performed by: DERMATOLOGY

## 2021-07-22 RX ORDER — CHOLESTYRAMINE 4 G/9G
POWDER, FOR SUSPENSION ORAL
COMMUNITY

## 2021-07-22 RX ORDER — VITAMIN B COMPLEX
1 CAPSULE ORAL DAILY
COMMUNITY

## 2021-07-22 RX ORDER — FOLIC ACID 0.8 MG
TABLET ORAL
COMMUNITY

## 2021-07-22 RX ORDER — TACROLIMUS 1 MG/G
OINTMENT TOPICAL 2 TIMES DAILY
Qty: 100 G | Refills: 3 | Status: SHIPPED | OUTPATIENT
Start: 2021-07-22 | End: 2021-08-04 | Stop reason: ALTCHOICE

## 2021-07-22 NOTE — PATIENT INSTRUCTIONS
LICHEN PLANUS PILARIS/ ALOPECIA  FOLLOW UP    Assessment and Plan:  Based on a thorough discussion of this condition and the management approach to it (including a comprehensive discussion of the known risks, side effects and potential benefits of treatment), the patient (family) agrees to implement the following specific plan:   Discontinue clobetasol 0 05% solution   Tacrolimus 0 1% ointment apply to scalp 2 times daily   Treat areas of alopecia left occiput with kenalog 5 injection   Follow up in 2 months  What is lichen planus? Lichen planus is a chronic inflammatory skin condition affecting the skin and mucosal surfaces  There are several clinical types of lichen planus that share similar features on histopathology   Cutaneous lichen planus   Mucosal lichen planus   Lichen planopilaris   Lichen planus of the nails   Lichen planus pigmentosus   Lichenoid drug eruption    Who gets lichen planus? Lichen planus affects about one in one hundred people worldwide, mostly affecting adults over the age of 36 years  About half those affected have oral lichen planus, which is more common in women than in men  About 98% have lichen planus of the nails  What causes lichen planus? Lichen planus is a T cell-mediated autoimmune disorder, in which inflammatory cells attack an unknown protein within the skin and mucosal keratinocytes    Contributing factors to lichen planus may include:   Genetic predisposition   Physical and emotional stress   Injury to the skin; lichen planus often appears where the skin has been scratched or after surgery -- this is called the isomorphic response (koebnerisation)   Localized skin disease such as herpes zoster--isotopic response   Systemic viral infection, such as hepatitis C (which might modify self-antigens on the surface of basal keratinocytes)   Contact allergy, such as to metal fillings in oral lichen planus (rare)   Drugs; gold, quinine, quinidine and others can cause a lichenoid rash    A lichenoid inflammation is also notable in lexuf-azaeuh-mtow disease, a complication of a bone marrow transplant  What are the clinical features of lichen planus? Lichen planus may cause a small number or many lesions on the skin and mucosal surfaces  Cutaneous lichen planus  The usual presentation of the disease is classical lichen planus  Symptoms can range from none (uncommon) to intense itch   Papules and polygonal plaques are shiny, flat-topped and firm on palpation   The plaques are crossed by fine white lines called Adelfo striae   Hypertrophic lichen planus can be scaly   Bullous lichen planus is rare   Size ranges from pinpoint to larger than a centimeter   Distribution may be scattered, clustered, linear, annular or actinic (sun-exposed sites such as face, neck and backs of the hands)   Location can be anywhere, but most often front of the wrists, lower back, and ankles   Colour depends on the patient's skin type  New papules and plaques often have a purple or violet hue, except on palms and soles where they are yellowish brown   Plaques resolve after some months to leave greyish-brown post-inflammatory macules that can take a year or longer to fade  Oral lichen planus  The mouth is often the only affected area  Oral lichen planus often involves the inside of the cheeks and the sides of the tongue, but the gums and lips may also be involved  The most common patterns are:   Painless white streaks in a lacy or fern-like pattern   Painful and persistent erosions and ulcers (erosive lichen planus)   Diffuse redness and peeling of the gums (desquamative gingivitis)   Localized inflammation of the gums adjacent to amalgam fillings    Vulval lichen planus  Lichen planus may affect labia majora, labia minora and vaginal introitus   Presentation includes:   Painless white streaks in a lacy or fern-like pattern   Painful and persistent erosions and ulcers (erosive lichen planus )   Scarring, resulting in adhesions, resorption of labia minora and introital stenosis   Painful desquamative vaginitis, preventing intercourse and causing a mucky vaginal discharge  The eroded vagina may bleed easily on contact   Overlap with vulval lichen sclerosus, an inflammatory skin disorder that most commonly affects women over 48years of age  Penile lichen planus   Penile lichen planus usually presents with classical papules in a ring around the glans  White streaks and erosive lichen planus may occur but are less common  Other mucosal sites   Erosive lichen planus uncommonly affects the lacrimal glands, eyelids, external ear canal, oesophagus, larynx, bladder and anus  Lichen planopilaris   Lichen planopilaris presents as tiny red spiny follicular papules on the scalp or less often, elsewhere on the body  Rarely, blistering occurs in the lesions  Destruction of the hair follicles leads to permanently bald patches characterized by sparse lonely hairs   Frontal fibrosing alopecia is a form of lichen planopilaris that affects the anterior scalp, forehead and eyebrows   Pseudopelade of Brocq is probably a variant of lichen planus without inflammation or scaling  Areas of scarring without hair slowly appear, described as like footprints in the snow  Lichen planus pigmentosus   Lichen planus pigmentosus describes ill-defined oval, greyish brown marks on the face and neck or trunk and limbs without an inflammatory phase  It is a form of acquired dermal macular hyperpigmentation  It can be provoked by sun exposure, but it can also arise in sun-protected sites such as the armpits  It has diffuse, reticulate and diffuse patterns  Lichen planus pigmentosus is similar to erythema dyschromicum perstans and may be the same disease     Lichen planus pigmentosus may rarely affect the lips, resulting in a patchy dark pigmentation on upper and lower lips     Lichenoid drug eruption   Lichenoid drug eruption refers to a lichen planus-like rash caused by medications  Asymptomatic or itchy; pink, brown or purple; flat, slightly scaly patches most often arise on the trunk  The oral mucosa (oral lichenoid reaction) and other sites are also sometimes affected  Many drugs can rarely cause lichenoid eruptions  The most common are:   Gold   Hydroxychloroquine   Captopril    What are the complications of lichen planus? Hypertrophic lichen planus may resemble squamous cell carcinoma  However, rarely, longstanding erosive lichen planus can result in true squamous cell carcinoma, most often in the mouth (oral cancer) or on the vulva (vulval cancer) or penis (penile cancer)  This should be suspected if there is an enlarging nodule or an ulcer with thickened edges in these sites  Cancer is more common in smokers, those with a history of cancer in mucosal sites, and in those who carry sexually acquired and oncogenic human papillomavirus  Cancer from other forms of lichen planus is rare  How is lichen planus diagnosed? In most cases, lichen planus is diagnosed by observing its clinical features  A biopsy is often recommended to confirm or make the diagnosis and to look for cancer  The histopathological signs are of a lichenoid tissue reaction affecting the epidermis  Typical features include:   Irregularly thickened epidermis   Degenerative skin cells   Liquefaction degeneration of the basal layer of the epidermis   Band of inflammatory cells just beneath the epidermis   Melanin (pigment) beneath the epidermis    Direct immunofluorescent staining may reveal deposits of immunoglobulins at the base of the epidermis  Patch tests may be recommended for patients with oral lichen planus affecting the gums and who have amalgam fillings, to assess for contact allergy to thiomersal (a mercurial compound)  What is the treatment for lichen planus?   Treatment is not always necessary  Local treatments for symptomatic cutaneous or mucosal disease are:   Potent topical steroids   Topical calcineurin inhibitors, tacrolimus ointment and pimecrolimus cream   Topical retinoids   Intralesional steroid injections    Systemic treatment for widespread lichen planus or severe local disease often includes a 1 to 3-month course of oral prednisone, while commencing another agent from the following list:   Acitretin   Hydroxychloroquine   Methotrexate   Azathioprine   Mycophenolate mofetil   Phototherapy    In cases of oral lichen planus affecting the gums with contact allergy to mercury, the lichen planus may resolve on replacing the fillings with composite material  If the lichen planus is not due to mercury allergy, removing amalgam fillings is very unlikely to result in a cure  Anecdotal success is reported from long courses of oral antibiotics and oral antifungal agents  Lichen planopilaris is reported to improve with pioglitazone  What is the outlook for lichen planus? Cutaneous lichen planus tends to clear within a couple of years in most people, but mucosal lichen planus is more likely to persist for a decade or longer  Spontaneous recovery is unpredictable, and lichen planus may recur at a later date  Scarring is permanent, including balding of the scalp  PROCEDURE:  INTRALESIONAL STEROID INJECTION (KENALOG INJECTION)    Purpose: Triamcinolone is a synthetic glucocorticoid corticosteroid that has marked anti-inflammatory action  It is prepared in sterile aqueous suspension suitable for injecting directly into a lesion on or immediately below the skin to treat a dermal inflammatory process  Indications:  It is indicated for alopecia areata; inflammatory acne cysts; discoid lupus erythematosus; keloids and hypertrophic scars; inflammatory lesions of granuloma annulare, lichen planus, lichen simplex chronicus (neurodermatitis), psoriatic plaques, and other localized inflammatory skin conditions  Potential Side Effects: I understand that triamcinolone injection can potentially cause early and/or delayed adverse effects such as:    Pain    Impaired wound healing    Increased hair growth    Bleeding    White or brown marks    Steroid acne    Infection    Telangiectasia    Skin thinning    Cutaneous and subcutaneous lipoatrophy (most common) appearing as skin indentations or dimples around the injection sites a few weeks after treatment     PROCEDURE NOTE:  After verbal and written consent were obtained, the to-be-treated area was wiped and cleaned with rubbing alcohol 70%  There was less than 1 mL of blood loss and little to no discomfort  The area was bandaged with a Band-aid  The patient tolerated the procedure well and remained in the office for observation  With no signs of an adverse reaction, the patient was eventually discharged from clinic

## 2021-07-22 NOTE — PROGRESS NOTES
[facility] Follow Up Note    Patient Name: Martine Medina  Encounter Date: 7 22 21    Today's Chief Concerns:   Concern #1:  Lichen planus   Concern #2:     Concern #3:      Current Medications:    Current Outpatient Medications:     clobetasol (TEMOVATE) 0 05 % cream, Apply topically 2 (two) times a day, Disp: , Rfl:     clobetasol (TEMOVATE) 0 05 % external solution, APPLY TOPICALLY TWO TIMES A DAY (GENERIC FOR TEMOVATE), Disp: 50 mL, Rfl: 3    cycloSPORINE (RESTASIS) 0 05 % ophthalmic emulsion, 1 drop by Tube route 2 (two) times a day, Disp: , Rfl:     dutasteride (AVODART) 0 5 mg capsule, Take 1 capsule (0 5 mg total) by mouth daily, Disp: 90 capsule, Rfl: 2    estradiol (ESTRACE) 0 1 mg/g vaginal cream, Insert into the vagina 2 (two) times a week, Disp: , Rfl:     folic acid (FOLVITE) 1 mg tablet, Take one tablet daily while on methotrexate to prevent side effects, Disp: 90 tablet, Rfl: 2    Lutein 40 MG CAPS, Take by mouth daily, Disp: , Rfl:     methotrexate 2 5 mg tablet, Start taking 4 pills once weekly and increase by 1 pill weekly until 6 pills once weekly is reached, continue at 6 pills once weekly until follow up appointment in 3 months, Disp: 72 tablet, Rfl: 1    Multiple Vitamin-Folic Acid TABS, Take by mouth, Disp: , Rfl:     b complex vitamins capsule, Take 1 capsule by mouth daily, Disp: , Rfl:     Cholecalciferol-Vitamin C 1000-500 UNIT-MG CAPS, Take by mouth, Disp: , Rfl:     cholestyramine (Questran) 4 g packet, cholestyramine 4 gm pack, Disp: , Rfl:     CHOLESTYRAMINE PO, Take by mouth, Disp: , Rfl:     hydrocortisone 2 5 % cream, apply topically twice a day to affected areas on face for 2 weeks (Patient not taking: Reported on 7/22/2021), Disp: 30 g, Rfl: 1    Ivermectin (Soolantra) 1 % CREA, Apply topically 1-2 times a day to face area as needed for rosacea   (Patient not taking: Reported on 1/25/2021), Disp: 45 g, Rfl: 5    Magnesium 500 MG CAPS, Take by mouth, Disp: , Rfl:     methotrexate 2 5 mg tablet, Take 3 tablets at once week one, repeat lab work and wait for results for taking second dose week 2, Disp: 3 tablet, Rfl: 1    metoprolol succinate (TOPROL-XL) 50 mg 24 hr tablet, Take 50 mg by mouth daily (Patient not taking: Reported on 7/22/2021), Disp: , Rfl:     CONSTITUTIONAL:   Vitals:    07/22/21 1001   Temp: 98 1 °F (36 7 °C)   TempSrc: Probe   Weight: 54 2 kg (119 lb 6 4 oz)   Height: 5' 4" (1 626 m)             Specific Alerts:    Have you been seen by a [facility] Dermatologist in the last 3 years? YES    Are you pregnant or planning to become pregnant? No    Are you currently or planning to be nursing or breast feeding? No    Allergies   Allergen Reactions    Dapsone Facial Swelling    Epinephrine     Mupirocin     Cephalosporins Rash       May we call your Preferred Phone number to discuss your specific medical information? YES    May we leave a detailed message that includes your specific medical information? YES    Have you traveled outside of the [facility] in the past 3 months? No    Do you currently have a pacemaker or defibrillator? No    Do you have any artificial heart valves, joints, plates, screws, rods, stents, pins, etc? No   - If Yes, were any placed within the last 2 years? Do you require any medications prior to a surgical procedure? No   - If Yes, for which procedure? - If Yes, what medications to you require? Are you taking any medications that cause you to bleed more easily ("blood thinners") No    Have you ever experienced a rapid heartbeat with epinephrine? No    Have you ever been treated with "gold" (gold sodium thiomalate) therapy? No    [facility] can help with wrinkles, "laugh lines," facial volume loss, "double chin," "love handles," age spots, and more  Are you interested in learning today about some of the skin enhancement procedures that we offer?  (If Yes, please provide more detail) No    Review of Systems:  Have you recently had or currently have any of the following? · Fever or chills: No  · Night Sweats: No  · Headaches: No  · Weight Gain: No  · Weight Loss: No  · Blurry Vision: No  · Nausea: No  · Vomiting: No  · Diarrhea: No  · Blood in Stool: No  · Abdominal Pain: No  · Itchy Skin: No  · Painful Joints: No  · Swollen Joints: No  · Muscle Pain: No  · Irregular Mole: No  · Sun Burn: No  · Dry Skin: No  · Skin Color Changes: No  · Scar or Keloid: No  · Cold Sores/Fever Blisters: No  · Bacterial Infections/MRSA: No  · Anxiety: No  · Depression: No  · Suicidal or Homicidal Thoughts: No      PSYCH: Normal mood and affect  EYES: Normal conjunctiva  ENT: Normal lips and oral mucosa  CARDIOVASCULAR: No edema  RESPIRATORY: Normal respirations  HEME/LYMPH/IMMUNO:  No regional lymphadenopathy except as noted below in ASSESSMENT AND PLAN BY DIAGNOSIS    FULL ORGAN SYSTEM SKIN EXAM (SKIN)   Hair, Scalp, Ears, Face Normal except as noted below in Assessment   Neck, Cervical Chain Nodes Normal except as noted below in Assessment   Right Arm/Hand/Fingers    Left Arm/Hand/Fingers    Chest/Breasts/Axillae    Abdomen, Umbilicus    Back/Spine    Groin/Genitalia/Buttocks    Right Leg, Foot, Toes    Left Leg, Foot, Toes        LICHEN PLANUS PILARIS/ ALOPECIA  FOLLOW UP    Physical Exam:   Anatomic Location Affected:  Frontal and vertex, scalp   Morphological Description:  Erythema of the parietal scalp no scale no additional hair loss,    Pertinent Positives:   Pertinent Negatives:     Additional History of Present Condition:  Frontal scalp improvement currently using clobetasol, methotrexate and hand held light    Assessment and Plan:  Based on a thorough discussion of this condition and the management approach to it (including a comprehensive discussion of the known risks, side effects and potential benefits of treatment), the patient (family) agrees to implement the following specific plan:   Discontinue clobetasol 0 05% solution   Tacrolimus 0 1% ointment apply to scalp 2 times daily   Treat areas of alopecia left occiput with kenalog 5 injection   Follow up in 2 months  What is lichen planus? Lichen planus is a chronic inflammatory skin condition affecting the skin and mucosal surfaces  There are several clinical types of lichen planus that share similar features on histopathology   Cutaneous lichen planus   Mucosal lichen planus   Lichen planopilaris   Lichen planus of the nails   Lichen planus pigmentosus   Lichenoid drug eruption    Who gets lichen planus? Lichen planus affects about one in one hundred people worldwide, mostly affecting adults over the age of 36 years  About half those affected have oral lichen planus, which is more common in women than in men  About 01% have lichen planus of the nails  What causes lichen planus? Lichen planus is a T cell-mediated autoimmune disorder, in which inflammatory cells attack an unknown protein within the skin and mucosal keratinocytes  Contributing factors to lichen planus may include:   Genetic predisposition   Physical and emotional stress   Injury to the skin; lichen planus often appears where the skin has been scratched or after surgery -- this is called the isomorphic response (koebnerisation)   Localized skin disease such as herpes zoster--isotopic response   Systemic viral infection, such as hepatitis C (which might modify self-antigens on the surface of basal keratinocytes)   Contact allergy, such as to metal fillings in oral lichen planus (rare)   Drugs; gold, quinine, quinidine and others can cause a lichenoid rash    A lichenoid inflammation is also notable in [id number] disease, a complication of a bone marrow transplant  What are the clinical features of lichen planus? Lichen planus may cause a small number or many lesions on the skin and mucosal surfaces      Cutaneous lichen planus  The usual presentation of the disease is classical lichen planus  Symptoms can range from none (uncommon) to intense itch   Papules and polygonal plaques are shiny, flat-topped and firm on palpation   The plaques are crossed by fine white lines called Adelfo striae   Hypertrophic lichen planus can be scaly   Bullous lichen planus is rare   Size ranges from pinpoint to larger than a centimeter   Distribution may be scattered, clustered, linear, annular or actinic (sun-exposed sites such as face, neck and backs of the hands)   Location can be anywhere, but most often front of the wrists, lower back, and ankles   Colour depends on the patient's skin type  New papules and plaques often have a purple or violet hue, except on palms and soles where they are yellowish brown   Plaques resolve after some months to leave greyish-brown post-inflammatory macules that can take a year or longer to fade  Oral lichen planus  The mouth is often the only affected area  Oral lichen planus often involves the inside of the cheeks and the sides of the tongue, but the gums and lips may also be involved  The most common patterns are:   Painless white streaks in a lacy or fern-like pattern   Painful and persistent erosions and ulcers (erosive lichen planus)   Diffuse redness and peeling of the gums (desquamative gingivitis)   Localized inflammation of the gums adjacent to amalgam fillings    Vulval lichen planus  Lichen planus may affect labia majora, labia minora and vaginal introitus  Presentation includes:   Painless white streaks in a lacy or fern-like pattern   Painful and persistent erosions and ulcers (erosive lichen planus )   Scarring, resulting in adhesions, resorption of labia minora and introital stenosis   Painful desquamative vaginitis, preventing intercourse and causing a mucky vaginal discharge   The eroded vagina may bleed easily on contact   Overlap with vulval lichen sclerosus, an inflammatory skin disorder that most commonly affects women over 48years of age  Penile lichen planus   Penile lichen planus usually presents with classical papules in a ring around the glans  White streaks and erosive lichen planus may occur but are less common  Other mucosal sites   Erosive lichen planus uncommonly affects the lacrimal glands, eyelids, external ear canal, oesophagus, larynx, bladder and anus  Lichen planopilaris   Lichen planopilaris presents as tiny red spiny follicular papules on the scalp or less often, elsewhere on the body  Rarely, blistering occurs in the lesions  Destruction of the hair follicles leads to permanently bald patches characterized by sparse lonely hairs   Frontal fibrosing alopecia is a form of lichen planopilaris that affects the anterior scalp, forehead and eyebrows   Pseudopelade of Brocq is probably a variant of lichen planus without inflammation or scaling  Areas of scarring without hair slowly appear, described as like footprints in the snow  Lichen planus pigmentosus   Lichen planus pigmentosus describes ill-defined oval, greyish brown marks on the face and neck or trunk and limbs without an inflammatory phase  It is a form of acquired dermal macular hyperpigmentation  It can be provoked by sun exposure, but it can also arise in sun-protected sites such as the armpits  It has diffuse, reticulate and diffuse patterns  Lichen planus pigmentosus is similar to erythema dyschromicum perstans and may be the same disease   Lichen planus pigmentosus may rarely affect the lips, resulting in a patchy dark pigmentation on upper and lower lips  Lichenoid drug eruption   Lichenoid drug eruption refers to a lichen planus-like rash caused by medications  Asymptomatic or itchy; pink, brown or purple; flat, slightly scaly patches most often arise on the trunk  The oral mucosa (oral lichenoid reaction) and other sites are also sometimes affected   Many drugs can rarely cause lichenoid eruptions  The most common are:   Gold   Hydroxychloroquine   Captopril    What are the complications of lichen planus? Hypertrophic lichen planus may resemble squamous cell carcinoma  However, rarely, longstanding erosive lichen planus can result in true squamous cell carcinoma, most often in the mouth (oral cancer) or on the vulva (vulval cancer) or penis (penile cancer)  This should be suspected if there is an enlarging nodule or an ulcer with thickened edges in these sites  Cancer is more common in smokers, those with a history of cancer in mucosal sites, and in those who carry sexually acquired and oncogenic human papillomavirus  Cancer from other forms of lichen planus is rare  How is lichen planus diagnosed? In most cases, lichen planus is diagnosed by observing its clinical features  A biopsy is often recommended to confirm or make the diagnosis and to look for cancer  The histopathological signs are of a lichenoid tissue reaction affecting the epidermis  Typical features include:   Irregularly thickened epidermis   Degenerative skin cells   Liquefaction degeneration of the basal layer of the epidermis   Band of inflammatory cells just beneath the epidermis   Melanin (pigment) beneath the epidermis    Direct immunofluorescent staining may reveal deposits of immunoglobulins at the base of the epidermis  Patch tests may be recommended for patients with oral lichen planus affecting the gums and who have amalgam fillings, to assess for contact allergy to thiomersal (a mercurial compound)  What is the treatment for lichen planus? Treatment is not always necessary   Local treatments for symptomatic cutaneous or mucosal disease are:   Potent topical steroids   Topical calcineurin inhibitors, tacrolimus ointment and pimecrolimus cream   Topical retinoids   Intralesional steroid injections    Systemic treatment for widespread lichen planus or severe local disease often includes a 1 to 3-month course of oral prednisone, while commencing another agent from the following list:   Acitretin   Hydroxychloroquine   Methotrexate   Azathioprine   Mycophenolate mofetil   Phototherapy    In cases of oral lichen planus affecting the gums with contact allergy to mercury, the lichen planus may resolve on replacing the fillings with composite material  If the lichen planus is not due to mercury allergy, removing amalgam fillings is very unlikely to result in a cure  Anecdotal success is reported from long courses of oral antibiotics and oral antifungal agents  Lichen planopilaris is reported to improve with pioglitazone  What is the outlook for lichen planus? Cutaneous lichen planus tends to clear within a couple of years in most people, but mucosal lichen planus is more likely to persist for a decade or longer  Spontaneous recovery is unpredictable, and lichen planus may recur at a later date  Scarring is permanent, including balding of the scalp  PROCEDURE:  INTRALESIONAL STEROID INJECTION (KENALOG INJECTION)    Purpose: Triamcinolone is a synthetic glucocorticoid corticosteroid that has marked anti-inflammatory action  It is prepared in sterile aqueous suspension suitable for injecting directly into a lesion on or immediately below the skin to treat a dermal inflammatory process  Indications: It is indicated for alopecia areata; inflammatory acne cysts; discoid lupus erythematosus; keloids and hypertrophic scars; inflammatory lesions of granuloma annulare, lichen planus, lichen simplex chronicus (neurodermatitis), psoriatic plaques, and other localized inflammatory skin conditions       Potential Side Effects: I understand that triamcinolone injection can potentially cause early and/or delayed adverse effects such as:    Pain    Impaired wound healing    Increased hair growth    Bleeding    White or brown marks    Steroid acne    Infection    Telangiectasia    Skin thinning    Cutaneous and subcutaneous lipoatrophy (most common) appearing as skin indentations or dimples around the injection sites a few weeks after treatment     PROCEDURE NOTE:  After verbal and written consent were obtained, the to-be-treated area was wiped and cleaned with rubbing alcohol 70%  Then, a total of 0 5 mL of Kenalog CONCENTRATION:  5 mg/mL   (Lot# [id number]; Expiration August 2022, NDC#: 7699-2171-94) was injected intralesionally into a total of 1 large spot lesion/s on the following anatomic areas:  Left occiput using a 1-mL syringe and a 30-gauge needle  There was less than 1 mL of blood loss and little to no discomfort  The area was bandaged with a Band-aid  The patient tolerated the procedure well and remained in the office for observation  With no signs of an adverse reaction, the patient was eventually discharged from clinic        Scribe Attestation    I,:  Brenda Wallace am acting as a scribe while in the presence of the attending physician :       I,:  Getachew Alva MD personally performed the services described in this documentation    as scribed in my presence :

## 2021-08-02 NOTE — TELEPHONE ENCOUNTER
Patient was advised she was refunded, $331 50 on 7/27/21 via check  On 7/23/21 the following amounts were refunded to the patients credit card, $146 00, $169 50, $191 00, $191 00 and $191 00  The other dates of service are pending insurance

## 2021-08-02 NOTE — TELEPHONE ENCOUNTER
Patient doesn't like the protopic ointment since it makes her hair greasy  She wants to know if there another solution she can use  Is dovonex scalp solution an option?

## 2021-08-04 DIAGNOSIS — L66.1 LICHEN PLANOPILARIS: Primary | ICD-10-CM

## 2021-08-04 RX ORDER — CALCIPOTRIENE 0.05 MG/ML
1 SOLUTION TOPICAL 2 TIMES DAILY
Qty: 60 ML | Refills: 2 | Status: SHIPPED | OUTPATIENT
Start: 2021-08-04

## 2021-08-04 NOTE — PROGRESS NOTES
protopic ointment too greasy  Changed to calcipotriene solution for LPP    At the moment avoiding topical steroids as some atrophy and telangiectasia noted last visit

## 2021-08-06 NOTE — TELEPHONE ENCOUNTER
Patient is currently using up to 15 mg of methotrexate once weekly for a course of 4-5 weeks now  She would like to get off this  medication due to her age and is concerned it can interfere with her health  She will  be getting Covid booster shot and does not want the combination of methotrexate to affect her health as well  Please call patient and advised on what she should do  Thank you

## 2021-08-31 ENCOUNTER — IMMUNIZATIONS (OUTPATIENT)
Dept: FAMILY MEDICINE CLINIC | Facility: HOSPITAL | Age: 81
End: 2021-08-31

## 2021-08-31 DIAGNOSIS — Z23 ENCOUNTER FOR IMMUNIZATION: Primary | ICD-10-CM

## 2021-08-31 PROCEDURE — 0011A SARS-COV-2 / COVID-19 MRNA VACCINE (MODERNA) 100 MCG: CPT

## 2021-08-31 PROCEDURE — 91301 SARS-COV-2 / COVID-19 MRNA VACCINE (MODERNA) 100 MCG: CPT

## 2021-09-23 ENCOUNTER — OFFICE VISIT (OUTPATIENT)
Dept: DERMATOLOGY | Age: 81
End: 2021-09-23
Payer: COMMERCIAL

## 2021-09-23 VITALS — WEIGHT: 117 LBS | HEIGHT: 63 IN | TEMPERATURE: 98.3 F | BODY MASS INDEX: 20.73 KG/M2

## 2021-09-23 DIAGNOSIS — L82.1 SEBORRHEIC KERATOSIS: ICD-10-CM

## 2021-09-23 DIAGNOSIS — L43.9 LICHEN PLANUS: Primary | ICD-10-CM

## 2021-09-23 PROCEDURE — 99213 OFFICE O/P EST LOW 20 MIN: CPT | Performed by: DERMATOLOGY

## 2021-09-23 PROCEDURE — 11900 INJECT SKIN LESIONS </W 7: CPT | Performed by: DERMATOLOGY

## 2021-09-23 NOTE — PATIENT INSTRUCTIONS
LICHEN PLANUS PILARIS/ ALOPECIA FOLLOW UP    Assessment and Plan:  Based on a thorough discussion of this condition and the management approach to it (including a comprehensive discussion of the known risks, side effects and potential benefits of treatment), the patient (family) agrees to implement the following specific plan:   Kenalog injections performed at today's visit   Can restart Methotrexate; Can split dose over course of day; patient to also  take Dextromethorphan 15 mg while taking the methotrexate   2 month follow up    What is lichen planus? Lichen planus is a chronic inflammatory skin condition affecting the skin and mucosal surfaces  There are several clinical types of lichen planus that share similar features on histopathology   Cutaneous lichen planus   Mucosal lichen planus   Lichen planopilaris   Lichen planus of the nails   Lichen planus pigmentosus   Lichenoid drug eruption    Who gets lichen planus? Lichen planus affects about one in one hundred people worldwide, mostly affecting adults over the age of 36 years  About half those affected have oral lichen planus, which is more common in women than in men  About 38% have lichen planus of the nails  What causes lichen planus? Lichen planus is a T cell-mediated autoimmune disorder, in which inflammatory cells attack an unknown protein within the skin and mucosal keratinocytes    Contributing factors to lichen planus may include:   Genetic predisposition   Physical and emotional stress   Injury to the skin; lichen planus often appears where the skin has been scratched or after surgery -- this is called the isomorphic response (koebnerisation)   Localized skin disease such as herpes zoster--isotopic response   Systemic viral infection, such as hepatitis C (which might modify self-antigens on the surface of basal keratinocytes)   Contact allergy, such as to metal fillings in oral lichen planus (rare)   Drugs; gold, quinine, quinidine and others can cause a lichenoid rash    A lichenoid inflammation is also notable in [id number] disease, a complication of a bone marrow transplant  What are the clinical features of lichen planus? Lichen planus may cause a small number or many lesions on the skin and mucosal surfaces  Cutaneous lichen planus  The usual presentation of the disease is classical lichen planus  Symptoms can range from none (uncommon) to intense itch   Papules and polygonal plaques are shiny, flat-topped and firm on palpation   The plaques are crossed by fine white lines called Adelfo striae   Hypertrophic lichen planus can be scaly   Bullous lichen planus is rare   Size ranges from pinpoint to larger than a centimeter   Distribution may be scattered, clustered, linear, annular or actinic (sun-exposed sites such as face, neck and backs of the hands)   Location can be anywhere, but most often front of the wrists, lower back, and ankles   Colour depends on the patient's skin type  New papules and plaques often have a purple or violet hue, except on palms and soles where they are yellowish brown   Plaques resolve after some months to leave greyish-brown post-inflammatory macules that can take a year or longer to fade  Oral lichen planus  The mouth is often the only affected area  Oral lichen planus often involves the inside of the cheeks and the sides of the tongue, but the gums and lips may also be involved  The most common patterns are:   Painless white streaks in a lacy or fern-like pattern   Painful and persistent erosions and ulcers (erosive lichen planus)   Diffuse redness and peeling of the gums (desquamative gingivitis)   Localized inflammation of the gums adjacent to amalgam fillings    Vulval lichen planus  Lichen planus may affect labia majora, labia minora and vaginal introitus   Presentation includes:   Painless white streaks in a lacy or fern-like pattern   Painful and persistent erosions and ulcers (erosive lichen planus )   Scarring, resulting in adhesions, resorption of labia minora and introital stenosis   Painful desquamative vaginitis, preventing intercourse and causing a mucky vaginal discharge  The eroded vagina may bleed easily on contact   Overlap with vulval lichen sclerosus, an inflammatory skin disorder that most commonly affects women over 48years of age  Penile lichen planus   Penile lichen planus usually presents with classical papules in a ring around the glans  White streaks and erosive lichen planus may occur but are less common  Other mucosal sites   Erosive lichen planus uncommonly affects the lacrimal glands, eyelids, external ear canal, oesophagus, larynx, bladder and anus  Lichen planopilaris   Lichen planopilaris presents as tiny red spiny follicular papules on the scalp or less often, elsewhere on the body  Rarely, blistering occurs in the lesions  Destruction of the hair follicles leads to permanently bald patches characterized by sparse lonely hairs   Frontal fibrosing alopecia is a form of lichen planopilaris that affects the anterior scalp, forehead and eyebrows   Pseudopelade of Brocq is probably a variant of lichen planus without inflammation or scaling  Areas of scarring without hair slowly appear, described as like footprints in the snow  Lichen planus pigmentosus   Lichen planus pigmentosus describes ill-defined oval, greyish brown marks on the face and neck or trunk and limbs without an inflammatory phase  It is a form of acquired dermal macular hyperpigmentation  It can be provoked by sun exposure, but it can also arise in sun-protected sites such as the armpits  It has diffuse, reticulate and diffuse patterns  Lichen planus pigmentosus is similar to erythema dyschromicum perstans and may be the same disease     Lichen planus pigmentosus may rarely affect the lips, resulting in a patchy dark pigmentation on upper and lower lips  Lichenoid drug eruption   Lichenoid drug eruption refers to a lichen planus-like rash caused by medications  Asymptomatic or itchy; pink, brown or purple; flat, slightly scaly patches most often arise on the trunk  The oral mucosa (oral lichenoid reaction) and other sites are also sometimes affected  Many drugs can rarely cause lichenoid eruptions  The most common are:   Gold   Hydroxychloroquine   Captopril    What are the complications of lichen planus? Hypertrophic lichen planus may resemble squamous cell carcinoma  However, rarely, longstanding erosive lichen planus can result in true squamous cell carcinoma, most often in the mouth (oral cancer) or on the vulva (vulval cancer) or penis (penile cancer)  This should be suspected if there is an enlarging nodule or an ulcer with thickened edges in these sites  Cancer is more common in smokers, those with a history of cancer in mucosal sites, and in those who carry sexually acquired and oncogenic human papillomavirus  Cancer from other forms of lichen planus is rare  How is lichen planus diagnosed? In most cases, lichen planus is diagnosed by observing its clinical features  A biopsy is often recommended to confirm or make the diagnosis and to look for cancer  The histopathological signs are of a lichenoid tissue reaction affecting the epidermis  Typical features include:   Irregularly thickened epidermis   Degenerative skin cells   Liquefaction degeneration of the basal layer of the epidermis   Band of inflammatory cells just beneath the epidermis   Melanin (pigment) beneath the epidermis    Direct immunofluorescent staining may reveal deposits of immunoglobulins at the base of the epidermis  Patch tests may be recommended for patients with oral lichen planus affecting the gums and who have amalgam fillings, to assess for contact allergy to thiomersal (a mercurial compound)  What is the treatment for lichen planus?   Treatment is not always necessary  Local treatments for symptomatic cutaneous or mucosal disease are:   Potent topical steroids   Topical calcineurin inhibitors, tacrolimus ointment and pimecrolimus cream   Topical retinoids   Intralesional steroid injections    Systemic treatment for widespread lichen planus or severe local disease often includes a 1 to 3-month course of oral prednisone, while commencing another agent from the following list:   Acitretin   Hydroxychloroquine   Methotrexate   Azathioprine   Mycophenolate mofetil   Phototherapy    In cases of oral lichen planus affecting the gums with contact allergy to mercury, the lichen planus may resolve on replacing the fillings with composite material  If the lichen planus is not due to mercury allergy, removing amalgam fillings is very unlikely to result in a cure  Anecdotal success is reported from long courses of oral antibiotics and oral antifungal agents  Lichen planopilaris is reported to improve with pioglitazone  What is the outlook for lichen planus? Cutaneous lichen planus tends to clear within a couple of years in most people, but mucosal lichen planus is more likely to persist for a decade or longer  Spontaneous recovery is unpredictable, and lichen planus may recur at a later date  Scarring is permanent, including balding of the scalp  SEBORRHEIC KERATOSIS; NON-INFLAMED    Assessment and Plan:  Based on a thorough discussion of this condition and the management approach to it (including a comprehensive discussion of the known risks, side effects and potential benefits of treatment), the patient (family) agrees to implement the following specific plan:   Reassured benign   Liquid nitrogen was applied for 10-12 seconds to the skin lesion and the expected blistering or scabbing reaction explained  Do not pick at the area  Patient reminded to expect hypopigmented scars from the procedure  Return if lesion fails to fully resolve   Treated as a curiosity      Seborrheic Keratosis  A seborrheic keratosis is a harmless warty spot that appears during adult life as a common sign of skin aging  Seborrheic keratoses can arise on any area of skin, covered or uncovered, with the usual exception of the palms and soles  They do not arise from mucous membranes  Seborrheic keratoses can have highly variable appearance  Seborrheic keratoses are extremely common  It has been estimated that over 90% of adults over the age of 61 years have one or more of them  They occur in males and females of all races, typically beginning to erupt in the 35s or 45s  They are uncommon under the age of 21 years  The precise cause of seborrhoeic keratoses is not known  Seborrhoeic keratoses are considered degenerative in nature  As time goes by, seborrheic keratoses tend to become more numerous  Some people inherit a tendency to develop a very large number of them; some people may have hundreds of them  The name "seborrheic keratosis" is misleading, because these lesions are not limited to a seborrhoeic distribution (scalp, mid-face, chest, upper back), nor are they formed from sebaceous glands, nor are they associated with sebum -- which is greasy  Seborrheic keratosis may also be called "SK," "Seb K," "basal cell papilloma," "senile wart," or "barnacle "      Researchers have noted:   Eruptive seborrhoeic keratoses can follow sunburn or dermatitis   Skin friction may be the reason they appear in body folds   Viral cause (e g , human papillomavirus) seems unlikely   Stable and clonal mutations or activation of FRFR3, PIK3CA, ANDREEA, AKT1 and EGFR genes are found in seborrhoeic keratoses   Seborrhoeic keratosis can arise from solar lentigo   FRFR3 mutations also arise in solar lentigines   These mutations are associated with increased age and location on the head and neck, suggesting a role of ultraviolet radiation in these lesions   Seborrheic keratoses do not harbour tumour suppressor gene mutations   Epidermal growth factor receptor inhibitors, which are used to treat some cancers, often result in an increase in verrucal (warty) keratoses  There is no easy way to remove multiple lesions on a single occasion  Unless a specific lesion is "inflamed" and is causing pain or stinging/burning or is bleeding, most insurance companies do not authorize treatment  Sunitha Wilder

## 2021-09-23 NOTE — PROGRESS NOTES
[facility] Follow Up Note    Patient Name: Chon Madera  Encounter Date: 09/23/21    Today's Chief Concerns:  Vikki Saundra Concern #1:  Follow up to lichen planus   Concern #2:  Skin tags on back      Current Medications:    Current Outpatient Medications:     b complex vitamins capsule, Take 1 capsule by mouth daily, Disp: , Rfl:     calcipotriene (DOVONEX) 0 005 % topical solution, Apply 1 application topically 2 (two) times a day, Disp: 60 mL, Rfl: 2    cholestyramine (Questran) 4 g packet, cholestyramine 4 gm pack, Disp: , Rfl:     cycloSPORINE (RESTASIS) 0 05 % ophthalmic emulsion, 1 drop by Tube route 2 (two) times a day, Disp: , Rfl:     estradiol (ESTRACE) 0 1 mg/g vaginal cream, Insert into the vagina 2 (two) times a week, Disp: , Rfl:     Lutein 40 MG CAPS, Take by mouth daily, Disp: , Rfl:     Magnesium 500 MG CAPS, Take by mouth, Disp: , Rfl:     Cholecalciferol-Vitamin C 1000-500 UNIT-MG CAPS, Take by mouth, Disp: , Rfl:     CHOLESTYRAMINE PO, Take by mouth, Disp: , Rfl:     clobetasol (TEMOVATE) 0 05 % cream, Apply topically 2 (two) times a day, Disp: , Rfl:     clobetasol (TEMOVATE) 0 05 % external solution, APPLY TOPICALLY TWO TIMES A DAY (GENERIC FOR TEMOVATE), Disp: 50 mL, Rfl: 3    dutasteride (AVODART) 0 5 mg capsule, Take 1 capsule (0 5 mg total) by mouth daily, Disp: 90 capsule, Rfl: 2    folic acid (FOLVITE) 1 mg tablet, Take one tablet daily while on methotrexate to prevent side effects, Disp: 90 tablet, Rfl: 2    hydrocortisone 2 5 % cream, apply topically twice a day to affected areas on face for 2 weeks (Patient not taking: Reported on 7/22/2021), Disp: 30 g, Rfl: 1    Ivermectin (Soolantra) 1 % CREA, Apply topically 1-2 times a day to face area as needed for rosacea   (Patient not taking: Reported on 1/25/2021), Disp: 45 g, Rfl: 5    methotrexate 2 5 mg tablet, Take 3 tablets at once week one, repeat lab work and wait for results for taking second dose week 2, Disp: 3 tablet, Rfl: 1    methotrexate 2 5 mg tablet, Start taking 4 pills once weekly and increase by 1 pill weekly until 6 pills once weekly is reached, continue at 6 pills once weekly until follow up appointment in 3 months, Disp: 72 tablet, Rfl: 1    metoprolol succinate (TOPROL-XL) 50 mg 24 hr tablet, Take 50 mg by mouth daily (Patient not taking: Reported on 7/22/2021), Disp: , Rfl:     Multiple Vitamin-Folic Acid TABS, Take by mouth, Disp: , Rfl:     CONSTITUTIONAL:   Vitals:    09/23/21 0926   Temp: 98 3 °F (36 8 °C)   TempSrc: Temporal   Weight: 53 1 kg (117 lb)   Height: 5' 3" (1 6 m)       Specific Alerts:    Have you been seen by a [facility] Dermatologist in the last 3 years? YES    Are you pregnant or planning to become pregnant? No    Are you currently or planning to be nursing or breast feeding? No    Allergies   Allergen Reactions    Dapsone Facial Swelling    Epinephrine     Mupirocin     Cephalosporins Rash       May we call your Preferred Phone number to discuss your specific medical information? YES    May we leave a detailed message that includes your specific medical information? YES    Have you traveled outside of the [facility] in the past 3 months? No    Do you currently have a pacemaker or defibrillator? No    Do you have any artificial heart valves, joints, plates, screws, rods, stents, pins, etc? No   - If Yes, were any placed within the last 2 years? Loop monitor implant placed in 4 years ago    Do you require any medications prior to a surgical procedure? No    Are you taking any medications that cause you to bleed more easily ("blood thinners") No    Have you ever experienced a rapid heartbeat with epinephrine? YES    Have you ever been treated with "gold" (gold sodium thiomalate) therapy? No    [facility] can help with wrinkles, "laugh lines," facial volume loss, "double chin," "love handles," age spots, and more   Are you interested in learning today about some of the skin enhancement procedures that we offer? (If Yes, please provide more detail) No    Review of Systems:  Have you recently had or currently have any of the following?     · Fever or chills: No  · Night Sweats: No  · Headaches: No  · Weight Gain: No  · Weight Loss: No  · Blurry Vision: No  · Nausea: No  · Vomiting: No  · Diarrhea: No  · Blood in Stool: No  · Abdominal Pain: No  · Itchy Skin: YES  · Painful Joints: No  · Swollen Joints: No  · Muscle Pain: No  · Irregular Mole: No  · Sun Burn: No  · Dry Skin: YES  · Skin Color Changes: No  · Scar or Keloid: YES  · Cold Sores/Fever Blisters: No  · Bacterial Infections/MRSA: No  · Anxiety: No  · Depression: No  · Suicidal or Homicidal Thoughts: No      PSYCH: Normal mood and affect  EYES: Normal conjunctiva  ENT: Normal lips and oral mucosa  CARDIOVASCULAR: No edema  RESPIRATORY: Normal respirations  HEME/LYMPH/IMMUNO:  No regional lymphadenopathy except as noted below in ASSESSMENT AND PLAN BY DIAGNOSIS    FULL ORGAN SYSTEM SKIN EXAM (SKIN)  Hair, Scalp, Ears, Face Normal except as noted below in Assessment   Neck, Cervical Chain Nodes Normal except as noted below in Assessment   Right Arm/Hand/Fingers Normal except as noted below in Assessment   Left Arm/Hand/Fingers Normal except as noted below in Assessment   Chest/Breasts/Axillae Viewed areas Normal except as noted below in Assessment   Abdomen, Umbilicus Normal except as noted below in Assessment   Back/Spine Normal except as noted below in Assessment   Groin/Genitalia/Buttocks Viewed areas Normal except as noted below in Assessment   Right Leg, Foot, Toes Normal except as noted below in Assessment   Left Leg, Foot, Toes Normal except as noted below in Assessment       LICHEN PLANUS PILARIS/ ALOPECIA FOLLOW UP    Physical Exam:   Anatomic Location Affected:  Frontal and vertex, scalp   Morphological Description:  Reduced perifollicular erythema, stable scarring alopecia, moderate scale   Pertinent Positives:   Pertinent Negatives: Additional History of Present Condition:  PATIENT DOESN'T SEE ANY IMPROVEMENT SINCE THE LAST VISIT, PATIENT SEEING SPREADING MORE; patient using the at home light treatment with UV Wand    Assessment and Plan:  Based on a thorough discussion of this condition and the management approach to it (including a comprehensive discussion of the known risks, side effects and potential benefits of treatment), the patient (family) agrees to implement the following specific plan:   Kenalog injections performed at today's visit   Can restart Methotrexate; Can split dose over course of day; patient to also  take Dextromethorphan 15 mg while taking the methotrexate   2 month follow up    What is lichen planus? Lichen planus is a chronic inflammatory skin condition affecting the skin and mucosal surfaces  There are several clinical types of lichen planus that share similar features on histopathology   Cutaneous lichen planus   Mucosal lichen planus   Lichen planopilaris   Lichen planus of the nails   Lichen planus pigmentosus   Lichenoid drug eruption    Who gets lichen planus? Lichen planus affects about one in one hundred people worldwide, mostly affecting adults over the age of 36 years  About half those affected have oral lichen planus, which is more common in women than in men  About 77% have lichen planus of the nails  What causes lichen planus? Lichen planus is a T cell-mediated autoimmune disorder, in which inflammatory cells attack an unknown protein within the skin and mucosal keratinocytes    Contributing factors to lichen planus may include:   Genetic predisposition   Physical and emotional stress   Injury to the skin; lichen planus often appears where the skin has been scratched or after surgery -- this is called the isomorphic response (koebnerisation)   Localized skin disease such as herpes zoster--isotopic response   Systemic viral infection, such as hepatitis C (which might modify self-antigens on the surface of basal keratinocytes)   Contact allergy, such as to metal fillings in oral lichen planus (rare)   Drugs; gold, quinine, quinidine and others can cause a lichenoid rash    A lichenoid inflammation is also notable in zarip-[id number]-vvdh disease, a complication of a bone marrow transplant  What are the clinical features of lichen planus? Lichen planus may cause a small number or many lesions on the skin and mucosal surfaces  Cutaneous lichen planus  The usual presentation of the disease is classical lichen planus  Symptoms can range from none (uncommon) to intense itch   Papules and polygonal plaques are shiny, flat-topped and firm on palpation   The plaques are crossed by fine white lines called Adelfo striae   Hypertrophic lichen planus can be scaly   Bullous lichen planus is rare   Size ranges from pinpoint to larger than a centimeter   Distribution may be scattered, clustered, linear, annular or actinic (sun-exposed sites such as face, neck and backs of the hands)   Location can be anywhere, but most often front of the wrists, lower back, and ankles   Colour depends on the patient's skin type  New papules and plaques often have a purple or violet hue, except on palms and soles where they are yellowish brown   Plaques resolve after some months to leave greyish-brown post-inflammatory macules that can take a year or longer to fade  Oral lichen planus  The mouth is often the only affected area  Oral lichen planus often involves the inside of the cheeks and the sides of the tongue, but the gums and lips may also be involved   The most common patterns are:   Painless white streaks in a lacy or fern-like pattern   Painful and persistent erosions and ulcers (erosive lichen planus)   Diffuse redness and peeling of the gums (desquamative gingivitis)   Localized inflammation of the gums adjacent to amalgam fillings    Vulval lichen planus  Lichen planus may affect labia majora, labia minora and vaginal introitus  Presentation includes:   Painless white streaks in a lacy or fern-like pattern   Painful and persistent erosions and ulcers (erosive lichen planus )   Scarring, resulting in adhesions, resorption of labia minora and introital stenosis   Painful desquamative vaginitis, preventing intercourse and causing a mucky vaginal discharge  The eroded vagina may bleed easily on contact   Overlap with vulval lichen sclerosus, an inflammatory skin disorder that most commonly affects women over 48years of age  Penile lichen planus   Penile lichen planus usually presents with classical papules in a ring around the glans  White streaks and erosive lichen planus may occur but are less common  Other mucosal sites   Erosive lichen planus uncommonly affects the lacrimal glands, eyelids, external ear canal, oesophagus, larynx, bladder and anus  Lichen planopilaris   Lichen planopilaris presents as tiny red spiny follicular papules on the scalp or less often, elsewhere on the body  Rarely, blistering occurs in the lesions  Destruction of the hair follicles leads to permanently bald patches characterized by sparse lonely hairs   Frontal fibrosing alopecia is a form of lichen planopilaris that affects the anterior scalp, forehead and eyebrows   Pseudopelade of Brocq is probably a variant of lichen planus without inflammation or scaling  Areas of scarring without hair slowly appear, described as like footprints in the snow  Lichen planus pigmentosus   Lichen planus pigmentosus describes ill-defined oval, greyish brown marks on the face and neck or trunk and limbs without an inflammatory phase  It is a form of acquired dermal macular hyperpigmentation  It can be provoked by sun exposure, but it can also arise in sun-protected sites such as the armpits  It has diffuse, reticulate and diffuse patterns   Lichen planus pigmentosus is similar to erythema dyschromicum perstans and may be the same disease   Lichen planus pigmentosus may rarely affect the lips, resulting in a patchy dark pigmentation on upper and lower lips  Lichenoid drug eruption   Lichenoid drug eruption refers to a lichen planus-like rash caused by medications  Asymptomatic or itchy; pink, brown or purple; flat, slightly scaly patches most often arise on the trunk  The oral mucosa (oral lichenoid reaction) and other sites are also sometimes affected  Many drugs can rarely cause lichenoid eruptions  The most common are:   Gold   Hydroxychloroquine   Captopril    What are the complications of lichen planus? Hypertrophic lichen planus may resemble squamous cell carcinoma  However, rarely, longstanding erosive lichen planus can result in true squamous cell carcinoma, most often in the mouth (oral cancer) or on the vulva (vulval cancer) or penis (penile cancer)  This should be suspected if there is an enlarging nodule or an ulcer with thickened edges in these sites  Cancer is more common in smokers, those with a history of cancer in mucosal sites, and in those who carry sexually acquired and oncogenic human papillomavirus  Cancer from other forms of lichen planus is rare  How is lichen planus diagnosed? In most cases, lichen planus is diagnosed by observing its clinical features  A biopsy is often recommended to confirm or make the diagnosis and to look for cancer  The histopathological signs are of a lichenoid tissue reaction affecting the epidermis  Typical features include:   Irregularly thickened epidermis   Degenerative skin cells   Liquefaction degeneration of the basal layer of the epidermis   Band of inflammatory cells just beneath the epidermis   Melanin (pigment) beneath the epidermis    Direct immunofluorescent staining may reveal deposits of immunoglobulins at the base of the epidermis      Patch tests may be recommended for patients with oral lichen planus affecting the gums and who have amalgam fillings, to assess for contact allergy to thiomersal (a mercurial compound)  What is the treatment for lichen planus? Treatment is not always necessary  Local treatments for symptomatic cutaneous or mucosal disease are:   Potent topical steroids   Topical calcineurin inhibitors, tacrolimus ointment and pimecrolimus cream   Topical retinoids   Intralesional steroid injections    Systemic treatment for widespread lichen planus or severe local disease often includes a 1 to 3-month course of oral prednisone, while commencing another agent from the following list:   Acitretin   Hydroxychloroquine   Methotrexate   Azathioprine   Mycophenolate mofetil   Phototherapy    In cases of oral lichen planus affecting the gums with contact allergy to mercury, the lichen planus may resolve on replacing the fillings with composite material  If the lichen planus is not due to mercury allergy, removing amalgam fillings is very unlikely to result in a cure  Anecdotal success is reported from long courses of oral antibiotics and oral antifungal agents  Lichen planopilaris is reported to improve with pioglitazone  What is the outlook for lichen planus? Cutaneous lichen planus tends to clear within a couple of years in most people, but mucosal lichen planus is more likely to persist for a decade or longer  Spontaneous recovery is unpredictable, and lichen planus may recur at a later date  Scarring is permanent, including balding of the scalp  PROCEDURE:  INTRALESIONAL STEROID INJECTION (KENALOG INJECTION)    Purpose: Triamcinolone is a synthetic glucocorticoid corticosteroid that has marked anti-inflammatory action  It is prepared in sterile aqueous suspension suitable for injecting directly into a lesion on or immediately below the skin to treat a dermal inflammatory process  Indications:  It is indicated for alopecia areata; inflammatory acne cysts; discoid lupus erythematosus; keloids and hypertrophic scars; inflammatory lesions of granuloma annulare, lichen planus, lichen simplex chronicus (neurodermatitis), psoriatic plaques, and other localized inflammatory skin conditions  Potential Side Effects: I understand that triamcinolone injection can potentially cause early and/or delayed adverse effects such as:    Pain    Impaired wound healing    Increased hair growth    Bleeding    White or brown marks    Steroid acne    Infection    Telangiectasia    Skin thinning    Cutaneous and subcutaneous lipoatrophy (most common) appearing as skin indentations or dimples around the injection sites a few weeks after treatment     PROCEDURE NOTE:  After verbal and written consent were obtained, the to-be-treated area was wiped and cleaned with rubbing alcohol 70%  Then, a total of 1 mL of Kenalog CONCENTRATION:  10 mg/mL   (Lot# [id number]; Expiration JAN 2023, NDC#: 2102-0805-75) was injected intralesionally into a total of  1  lesion/s on the following anatomic areas:  Frontal and vertex scalp using a 1-mL syringe and a 30-gauge needle  There was less than 1 mL of blood loss and little to no discomfort  The area was bandaged with a Band-aid  The patient tolerated the procedure well and remained in the office for observation  With no signs of an adverse reaction, the patient was eventually discharged from clinic  SEBORRHEIC KERATOSIS; NON-INFLAMED    Physical Exam:   Anatomic Location Affected:  Frontal scalp   Morphological Description:  Flat and raised, waxy, smooth to warty textured, yellow to brownish-grey to dark brown to blackish, discrete, "stuck-on" appearing papules   Pertinent Positives:   Pertinent Negatives: Additional History of Present Condition:  Patient reports new bumps on the skin  Denies itch, burn, pain, bleeding or ulceration  Present constantly; nothing seems to make it worse or better  No prior treatment        Assessment and Plan:  Based on a thorough discussion of this condition and the management approach to it (including a comprehensive discussion of the known risks, side effects and potential benefits of treatment), the patient (family) agrees to implement the following specific plan:   Reassured benign   Liquid nitrogen was applied for 10-12 seconds to the skin lesion and the expected blistering or scabbing reaction explained  Do not pick at the area  Patient reminded to expect hypopigmented scars from the procedure  Return if lesion fails to fully resolve  Treated as a curiosity      Seborrheic Keratosis  A seborrheic keratosis is a harmless warty spot that appears during adult life as a common sign of skin aging  Seborrheic keratoses can arise on any area of skin, covered or uncovered, with the usual exception of the palms and soles  They do not arise from mucous membranes  Seborrheic keratoses can have highly variable appearance  Seborrheic keratoses are extremely common  It has been estimated that over 90% of adults over the age of 61 years have one or more of them  They occur in males and females of all races, typically beginning to erupt in the 35s or 45s  They are uncommon under the age of 21 years  The precise cause of seborrhoeic keratoses is not known  Seborrhoeic keratoses are considered degenerative in nature  As time goes by, seborrheic keratoses tend to become more numerous  Some people inherit a tendency to develop a very large number of them; some people may have hundreds of them  The name "seborrheic keratosis" is misleading, because these lesions are not limited to a seborrhoeic distribution (scalp, mid-face, chest, upper back), nor are they formed from sebaceous glands, nor are they associated with sebum -- which is greasy    Seborrheic keratosis may also be called "SK," "Seb K," "basal cell papilloma," "senile wart," or "barnacle "      Researchers have noted:   Eruptive seborrhoeic keratoses can follow sunburn or dermatitis   Skin friction may be the reason they appear in body folds   Viral cause (e g , human papillomavirus) seems unlikely   Stable and clonal mutations or activation of FRFR3, PIK3CA, ANDREEA, AKT1 and EGFR genes are found in seborrhoeic keratoses   Seborrhoeic keratosis can arise from solar lentigo   FRFR3 mutations also arise in solar lentigines  These mutations are associated with increased age and location on the head and neck, suggesting a role of ultraviolet radiation in these lesions   Seborrheic keratoses do not harbour tumour suppressor gene mutations   Epidermal growth factor receptor inhibitors, which are used to treat some cancers, often result in an increase in verrucal (warty) keratoses  There is no easy way to remove multiple lesions on a single occasion  Unless a specific lesion is "inflamed" and is causing pain or stinging/burning or is bleeding, most insurance companies do not authorize treatment         Scribe Attestation    I,:  Elsi Eubanks am acting as a scribe while in the presence of the attending physician :       I,:  Devan Vidal MD personally performed the services described in this documentation    as scribed in my presence :

## 2021-11-10 ENCOUNTER — TELEPHONE (OUTPATIENT)
Dept: DERMATOLOGY | Facility: CLINIC | Age: 81
End: 2021-11-10

## 2021-11-23 ENCOUNTER — OFFICE VISIT (OUTPATIENT)
Dept: DERMATOLOGY | Age: 81
End: 2021-11-23
Payer: COMMERCIAL

## 2021-11-23 VITALS — HEIGHT: 64 IN | WEIGHT: 119 LBS | BODY MASS INDEX: 20.32 KG/M2 | TEMPERATURE: 98.1 F

## 2021-11-23 DIAGNOSIS — L66.1 LICHEN PLANOPILARIS: Primary | ICD-10-CM

## 2021-11-23 DIAGNOSIS — L63.9 ALOPECIA AREATA: ICD-10-CM

## 2021-11-23 PROCEDURE — 11900 INJECT SKIN LESIONS </W 7: CPT | Performed by: DERMATOLOGY

## 2021-11-23 RX ORDER — MAGNESIUM GLUCONATE 30 MG(550)
30 TABLET ORAL 2 TIMES DAILY
COMMUNITY

## 2021-11-23 RX ORDER — METOPROLOL SUCCINATE 25 MG/1
25 TABLET, EXTENDED RELEASE ORAL DAILY
COMMUNITY

## 2021-11-23 RX ORDER — MINOXIDIL 2.5 MG/1
TABLET ORAL
Qty: 90 TABLET | Refills: 2 | Status: SHIPPED | OUTPATIENT
Start: 2021-11-23

## 2021-12-15 DIAGNOSIS — L71.9 ROSACEA: ICD-10-CM

## 2021-12-15 RX ORDER — IVERMECTIN 10 MG/G
CREAM TOPICAL
Qty: 45 G | Refills: 5 | Status: SHIPPED | OUTPATIENT
Start: 2021-12-15

## 2021-12-16 DIAGNOSIS — L66.1 LICHEN PLANOPILARIS: ICD-10-CM

## 2021-12-16 RX ORDER — DUTASTERIDE 0.5 MG/1
CAPSULE, LIQUID FILLED ORAL
Qty: 90 CAPSULE | Refills: 2 | Status: SHIPPED | OUTPATIENT
Start: 2021-12-16

## 2021-12-28 ENCOUNTER — OFFICE VISIT (OUTPATIENT)
Dept: DERMATOLOGY | Age: 81
End: 2021-12-28
Payer: COMMERCIAL

## 2021-12-28 VITALS — TEMPERATURE: 98.1 F | BODY MASS INDEX: 20.91 KG/M2 | WEIGHT: 118 LBS | HEIGHT: 63 IN

## 2021-12-28 DIAGNOSIS — L65.0 TELOGEN EFFLUVIUM: ICD-10-CM

## 2021-12-28 DIAGNOSIS — L43.9 LICHEN PLANUS: Primary | ICD-10-CM

## 2021-12-28 PROCEDURE — 99214 OFFICE O/P EST MOD 30 MIN: CPT | Performed by: DERMATOLOGY

## 2022-02-11 LAB
ANA HOMOGEN TITR SER: ABNORMAL {TITER}
ANA SER QL: NEGATIVE
ANA TITR SER IF: POSITIVE {TITER}
FERRITIN SERPL-MCNC: 98 NG/ML (ref 15–150)
IRON SATN MFR SERPL: 33 % (ref 15–55)
IRON SERPL-MCNC: 107 UG/DL (ref 27–139)
SL AMB NOTE:: ABNORMAL
TIBC SERPL-MCNC: 325 UG/DL (ref 250–450)
TSH SERPL DL<=0.005 MIU/L-ACNC: 2.58 UIU/ML (ref 0.45–4.5)
UIBC SERPL-MCNC: 218 UG/DL (ref 118–369)

## 2022-02-14 NOTE — RESULT ENCOUNTER NOTE
Labs reviewed and discussed with patient  Pt has rheumatologist   Gets injections for osteoporosis  Had negative LAKISHA 2 years ago  May be linked to new onset diffuse hair loss      Staff:  please fax LAKISHA with reflex and Antinuclear antibodies to Dr Rosa Moss @ [facility]   Fax number [phone]

## 2022-02-15 ENCOUNTER — TELEPHONE (OUTPATIENT)
Dept: DERMATOLOGY | Age: 82
End: 2022-02-15

## 2022-02-16 ENCOUNTER — OFFICE VISIT (OUTPATIENT)
Dept: DERMATOLOGY | Age: 82
End: 2022-02-16
Payer: COMMERCIAL

## 2022-02-16 VITALS — BODY MASS INDEX: 20.91 KG/M2 | WEIGHT: 118 LBS | TEMPERATURE: 98.3 F | HEIGHT: 63 IN

## 2022-02-16 DIAGNOSIS — L23.9 ALLERGIC CONTACT DERMATITIS, UNSPECIFIED TRIGGER: ICD-10-CM

## 2022-02-16 DIAGNOSIS — L25.9 CONTACT DERMATITIS, UNSPECIFIED CONTACT DERMATITIS TYPE, UNSPECIFIED TRIGGER: ICD-10-CM

## 2022-02-16 DIAGNOSIS — L65.0 TELOGEN EFFLUVIUM: ICD-10-CM

## 2022-02-16 DIAGNOSIS — L43.9 LICHEN PLANUS: Primary | ICD-10-CM

## 2022-02-16 PROCEDURE — 99214 OFFICE O/P EST MOD 30 MIN: CPT | Performed by: DERMATOLOGY

## 2022-02-16 RX ORDER — PREDNISONE 20 MG/1
TABLET ORAL
Qty: 42 TABLET | Refills: 0 | Status: SHIPPED | OUTPATIENT
Start: 2022-02-16 | End: 2022-03-09

## 2022-02-16 NOTE — PATIENT INSTRUCTIONS
Follow up on LICHEN PLANUS PILARIS/ WITH TELOGEN EFFLUVIUM     Assessment and Plan:  Based on a thorough discussion of this condition and the management approach to it (including a comprehensive discussion of the known risks, side effects and potential benefits of treatment), the patient (family) agrees to implement the following specific plan:   Hold off on kenalog injections until finished course of prednsione and follow up with rheumatologist   After course of prednisone wait 1 week then get lab work done   Bella Canseco Continue minoxidil 2 5 mg by mouth daily    Continue dutasteride 0 5 mg daily       CONTACT DERMATITIS    Assessment and Plan:  Diagnosis:  Contact dermatitis  Based on a thorough discussion of this condition and the management approach to it (including a comprehensive discussion of the known risks, side effects and potential benefits of treatment), the patient (family) agrees to implement the following specific plan:  · Start prednisone 60 daily 7 days ,40 mg daily 7 days, 20 mg  daily for 7 days by mouth for 3 weeks in the morning   · May consider minocycline if flares occur    Will work around tests for lupus

## 2022-02-16 NOTE — PROGRESS NOTES
[facility] Follow Up Note    Patient Name: Tiffanie Cloud  Encounter Date: 02/16/2022     Today's Chief Concerns:  Amanda Carnes Concern #1:  Follow up on lichen planus        Current Medications:    Current Outpatient Medications:     b complex vitamins capsule, Take 1 capsule by mouth daily, Disp: , Rfl:     calcipotriene (DOVONEX) 0 005 % topical solution, Apply 1 application topically 2 (two) times a day (Patient not taking: Reported on 11/23/2021 ), Disp: 60 mL, Rfl: 2    Cholecalciferol-Vitamin C 1000-500 UNIT-MG CAPS, Take by mouth, Disp: , Rfl:     cholestyramine (Questran) 4 g packet, cholestyramine 4 gm pack, Disp: , Rfl:     CHOLESTYRAMINE PO, Take by mouth, Disp: , Rfl:     clobetasol (TEMOVATE) 0 05 % cream, Apply topically 2 (two) times a day, Disp: , Rfl:     clobetasol (TEMOVATE) 0 05 % external solution, APPLY TOPICALLY TWO TIMES A DAY (GENERIC FOR TEMOVATE), Disp: 50 mL, Rfl: 3    cycloSPORINE (RESTASIS) 0 05 % ophthalmic emulsion, 1 drop by Tube route 2 (two) times a day, Disp: , Rfl:     dextromethorphan 15 MG/5ML syrup, Take 15 mg one time with methotrexate weekly (Patient not taking: Reported on 11/23/2021 ), Disp: 120 mL, Rfl: 0    dutasteride (AVODART) 0 5 mg capsule, TAKE ONE CAPSULE BY MOUTH EVERY DAY, Disp: 90 capsule, Rfl: 2    estradiol (ESTRACE) 0 1 mg/g vaginal cream, Insert into the vagina 2 (two) times a week, Disp: , Rfl:     folic acid (FOLVITE) 1 mg tablet, Take one tablet daily while on methotrexate to prevent side effects, Disp: 90 tablet, Rfl: 2    hydrocortisone 2 5 % cream, apply topically twice a day to affected areas on face for 2 weeks (Patient not taking: Reported on 7/22/2021), Disp: 30 g, Rfl: 1    Ivermectin (Soolantra) 1 % CREA, Apply topically 1-2 times a day to face area as needed for rosacea , Disp: 45 g, Rfl: 5    Lutein 40 MG CAPS, Take by mouth daily (Patient not taking: Reported on 11/23/2021 ), Disp: , Rfl:     Magnesium 500 MG CAPS, Take by mouth, Disp: , Rfl:     Magnesium Gluconate 550 MG TABS, Take 30 mg by mouth 2 (two) times a day (Patient not taking: Reported on 11/23/2021 ), Disp: , Rfl:     methotrexate 2 5 mg tablet, Take 3 tablets at once week one, repeat lab work and wait for results for taking second dose week 2, Disp: 3 tablet, Rfl: 1    methotrexate 2 5 mg tablet, Start taking 4 pills once weekly and increase by 1 pill weekly until 6 pills once weekly is reached, continue at 6 pills once weekly until follow up appointment in 3 months, Disp: 72 tablet, Rfl: 1    metoprolol succinate (TOPROL-XL) 25 mg 24 hr tablet, Take 25 mg by mouth daily, Disp: , Rfl:     metoprolol succinate (TOPROL-XL) 50 mg 24 hr tablet, Take 50 mg by mouth daily (Patient not taking: Reported on 7/22/2021), Disp: , Rfl:     minoxidil (LONITEN) 2 5 mg tablet, Take 1/2 a tablet by mouth once daily, Disp: 90 tablet, Rfl: 2    Multiple Vitamin-Folic Acid TABS, Take by mouth, Disp: , Rfl:     CONSTITUTIONAL:   There were no vitals filed for this visit  Specific Alerts:    Have you been seen by a [facility] in the last 3 years? YES    Are you pregnant or planning to become pregnant? No    Are you currently or planning to be nursing or breast feeding? No    Allergies   Allergen Reactions    Dapsone Facial Swelling    Epinephrine     Mupirocin     Seasonal Ic [Cholestatin] Itching    Cephalosporins Rash       May we call your Preferred Phone number to discuss your specific medical information? YES    May we leave a detailed message that includes your specific medical information? YES    Have you traveled outside of the [facility] in the past 3 months? No    Do you currently have a pacemaker or defibrillator? No    Do you have any artificial heart valves, joints, plates, screws, rods, stents, pins, etc? YES   - If Yes, were any placed within the last 2 years?  Loop monitor implant 2017    Do you require any medications prior to a surgical procedure? No      Are you taking any medications that cause you to bleed more easily ("blood thinners") No    Have you ever experienced a rapid heartbeat with epinephrine? YES    Have you ever been treated with "gold" (gold sodium thiomalate) therapy? No    [address] [facility] can help with wrinkles, "laugh lines," facial volume loss, "double chin," "love handles," age spots, and more  Are you interested in learning today about some of the skin enhancement procedures that we offer? (If Yes, please provide more detail) No    Review of Systems:  Have you recently had or currently have any of the following?     · Fever or chills: No  · Night Sweats: No  · Headaches: No  · Weight Gain: No  · Weight Loss: No  · Blurry Vision: No  · Nausea: No  · Vomiting: No  · Diarrhea: No  · Blood in Stool: No  · Abdominal Pain: No  · Itchy Skin: YES  · Painful Joints: No  · Swollen Joints: No  · Muscle Pain: No  · Irregular Mole: No  · Sun Burn: No  · Dry Skin: YES  · Skin Color Changes: No  · Scar or Keloid: No  · Cold Sores/Fever Blisters: No  · Bacterial Infections/MRSA: No  · Anxiety: No  · Depression: No  · Suicidal or Homicidal Thoughts: No      PSYCH: Normal mood and affect  EYES: Normal conjunctiva  ENT: Normal lips and oral mucosa  CARDIOVASCULAR: No edema  RESPIRATORY: Normal respirations  HEME/LYMPH/IMMUNO:  No regional lymphadenopathy except as noted below in ASSESSMENT AND PLAN BY DIAGNOSIS    FULL ORGAN SYSTEM SKIN EXAM (SKIN)   Hair, Scalp, Ears, Face Normal except as noted below in Assessment   Neck, Cervical Chain Nodes Normal except as noted below in Assessment   Right Arm/Hand/Fingers Normal except as noted below in Assessment   Left Arm/Hand/Fingers Normal except as noted below in Assessment   Chest/Breasts/Axillae Viewed areas Normal except as noted below in Assessment   Abdomen, Umbilicus Normal except as noted below in Assessment   Back/Spine Normal except as noted below in Assessment Groin/Genitalia/Buttocks Viewed areas Normal except as noted below in Assessment   Right Leg, Foot, Toes Normal except as noted below in Assessment   Left Leg, Foot, Toes Normal except as noted below in Assessment     Follow up on LICHEN PLANUS PILARIS/ WITH TELOGEN EFFLUVIUM     Physical Exam:   Anatomic Location Affected:  Central frontal scalp, superior parietal and vertex scalp    Morphological Description:  Near the vertex 2 cm with erythema nodules and scale    Pertinent Positives:   Pertinent Negatives: Additional History of Present Condition:  Previously advised to continue the minoxidil 2 5 mg daily, dutasteride daily  She states the areas feel lumpy and scaly  Occasionally itchy  Assessment and Plan: patient is being referred back to her rheumatologist in [facility] for evaluation of possible new onset lupus, + LAKISHA on work up for telogen effluvium  Recommend not getting lab work for rheumatologist until 1 week post completion of prednisone for filler reaction  May continue minoxidil and dutasteride  Do not use hand held light  Based on a thorough discussion of this condition and the management approach to it (including a comprehensive discussion of the known risks, side effects and potential benefits of treatment), the patient (family) agrees to implement the following specific plan:   Hold off on kenalog injections until finished course of prednsione and follow up with rheumatologist   After course of prednisone wait 1 week then get lab work done   Vikas Michael Continue minoxidil 2 5 mg by mouth daily    Continue dutasteride 0 5 mg daily       What is lichen planus? Lichen planus is a chronic inflammatory skin condition affecting the skin and mucosal surfaces  There are several clinical types of lichen planus that share similar features on histopathology     Cutaneous lichen planus   Mucosal lichen planus   Lichen planopilaris   Lichen planus of the nails   Lichen planus pigmentosus   Lichenoid drug eruption    Who gets lichen planus? Lichen planus affects about one in one hundred people worldwide, mostly affecting adults over the age of 36 years  About half those affected have oral lichen planus, which is more common in women than in men  About 86% have lichen planus of the nails  What causes lichen planus? Lichen planus is a T cell-mediated autoimmune disorder, in which inflammatory cells attack an unknown protein within the skin and mucosal keratinocytes  Contributing factors to lichen planus may include:   Genetic predisposition   Physical and emotional stress   Injury to the skin; lichen planus often appears where the skin has been scratched or after surgery -- this is called the isomorphic response (koebnerisation)   Localized skin disease such as herpes zoster--isotopic response   Systemic viral infection, such as hepatitis C (which might modify self-antigens on the surface of basal keratinocytes)   Contact allergy, such as to metal fillings in oral lichen planus (rare)   Drugs; gold, quinine, quinidine and others can cause a lichenoid rash    A lichenoid inflammation is also notable in [id number] disease, a complication of a bone marrow transplant  What are the clinical features of lichen planus? Lichen planus may cause a small number or many lesions on the skin and mucosal surfaces  Cutaneous lichen planus  The usual presentation of the disease is classical lichen planus  Symptoms can range from none (uncommon) to intense itch   Papules and polygonal plaques are shiny, flat-topped and firm on palpation   The plaques are crossed by fine white lines called Adelfo striae   Hypertrophic lichen planus can be scaly   Bullous lichen planus is rare   Size ranges from pinpoint to larger than a centimeter     Distribution may be scattered, clustered, linear, annular or actinic (sun-exposed sites such as face, neck and backs of the hands)   Location can be anywhere, but most often front of the wrists, lower back, and ankles   Colour depends on the patient's skin type  New papules and plaques often have a purple or violet hue, except on palms and soles where they are yellowish brown   Plaques resolve after some months to leave greyish-brown post-inflammatory macules that can take a year or longer to fade  Oral lichen planus  The mouth is often the only affected area  Oral lichen planus often involves the inside of the cheeks and the sides of the tongue, but the gums and lips may also be involved  The most common patterns are:   Painless white streaks in a lacy or fern-like pattern   Painful and persistent erosions and ulcers (erosive lichen planus)   Diffuse redness and peeling of the gums (desquamative gingivitis)   Localized inflammation of the gums adjacent to amalgam fillings    Vulval lichen planus  Lichen planus may affect labia majora, labia minora and vaginal introitus  Presentation includes:   Painless white streaks in a lacy or fern-like pattern   Painful and persistent erosions and ulcers (erosive lichen planus )   Scarring, resulting in adhesions, resorption of labia minora and introital stenosis   Painful desquamative vaginitis, preventing intercourse and causing a mucky vaginal discharge  The eroded vagina may bleed easily on contact   Overlap with vulval lichen sclerosus, an inflammatory skin disorder that most commonly affects women over 48years of age  Penile lichen planus   Penile lichen planus usually presents with classical papules in a ring around the glans  White streaks and erosive lichen planus may occur but are less common  Other mucosal sites   Erosive lichen planus uncommonly affects the lacrimal glands, eyelids, external ear canal, oesophagus, larynx, bladder and anus      Lichen planopilaris   Lichen planopilaris presents as tiny red spiny follicular papules on the scalp or less often, elsewhere on the body  Rarely, blistering occurs in the lesions  Destruction of the hair follicles leads to permanently bald patches characterized by sparse lonely hairs   Frontal fibrosing alopecia is a form of lichen planopilaris that affects the anterior scalp, forehead and eyebrows   Pseudopelade of Brocq is probably a variant of lichen planus without inflammation or scaling  Areas of scarring without hair slowly appear, described as like footprints in the snow  Lichen planus pigmentosus   Lichen planus pigmentosus describes ill-defined oval, greyish brown marks on the face and neck or trunk and limbs without an inflammatory phase  It is a form of acquired dermal macular hyperpigmentation  It can be provoked by sun exposure, but it can also arise in sun-protected sites such as the armpits  It has diffuse, reticulate and diffuse patterns  Lichen planus pigmentosus is similar to erythema dyschromicum perstans and may be the same disease   Lichen planus pigmentosus may rarely affect the lips, resulting in a patchy dark pigmentation on upper and lower lips  Lichenoid drug eruption   Lichenoid drug eruption refers to a lichen planus-like rash caused by medications  Asymptomatic or itchy; pink, brown or purple; flat, slightly scaly patches most often arise on the trunk  The oral mucosa (oral lichenoid reaction) and other sites are also sometimes affected  Many drugs can rarely cause lichenoid eruptions  The most common are:   Gold   Hydroxychloroquine   Captopril    What are the complications of lichen planus? Hypertrophic lichen planus may resemble squamous cell carcinoma  However, rarely, longstanding erosive lichen planus can result in true squamous cell carcinoma, most often in the mouth (oral cancer) or on the vulva (vulval cancer) or penis (penile cancer)  This should be suspected if there is an enlarging nodule or an ulcer with thickened edges in these sites   Cancer is more common in smokers, those with a history of cancer in mucosal sites, and in those who carry sexually acquired and oncogenic human papillomavirus  Cancer from other forms of lichen planus is rare  How is lichen planus diagnosed? In most cases, lichen planus is diagnosed by observing its clinical features  A biopsy is often recommended to confirm or make the diagnosis and to look for cancer  The histopathological signs are of a lichenoid tissue reaction affecting the epidermis  Typical features include:   Irregularly thickened epidermis   Degenerative skin cells   Liquefaction degeneration of the basal layer of the epidermis   Band of inflammatory cells just beneath the epidermis   Melanin (pigment) beneath the epidermis    Direct immunofluorescent staining may reveal deposits of immunoglobulins at the base of the epidermis  Patch tests may be recommended for patients with oral lichen planus affecting the gums and who have amalgam fillings, to assess for contact allergy to thiomersal (a mercurial compound)  What is the treatment for lichen planus? Treatment is not always necessary  Local treatments for symptomatic cutaneous or mucosal disease are:   Potent topical steroids   Topical calcineurin inhibitors, tacrolimus ointment and pimecrolimus cream   Topical retinoids   Intralesional steroid injections    Systemic treatment for widespread lichen planus or severe local disease often includes a 1 to 3-month course of oral prednisone, while commencing another agent from the following list:   Acitretin   Hydroxychloroquine   Methotrexate   Azathioprine   Mycophenolate mofetil   Phototherapy    In cases of oral lichen planus affecting the gums with contact allergy to mercury, the lichen planus may resolve on replacing the fillings with composite material  If the lichen planus is not due to mercury allergy, removing amalgam fillings is very unlikely to result in a cure    Anecdotal success is reported from long courses of oral antibiotics and oral antifungal agents  Lichen planopilaris is reported to improve with pioglitazone  What is the outlook for lichen planus? Cutaneous lichen planus tends to clear within a couple of years in most people, but mucosal lichen planus is more likely to persist for a decade or longer  Spontaneous recovery is unpredictable, and lichen planus may recur at a later date  Scarring is permanent, including balding of the scalp  HYPERSENSITIVITY REACTION    Physical Exam:   Anatomic Location Affected:  Bilateral cheeks    Morphological Description:  Erythema and edema tenderness on bilateral cheeks    Pertinent Positives:   Pertinent Negatives: Additional History of Present Condition:  Voluma fillers done 1 week ago  Has had fillers previously with no problem  Very itchy  Assessment and Plan: suspect reaction related to COVID vaccination    Based on a thorough discussion of this condition and the management approach to it (including a comprehensive discussion of the known risks, side effects and potential benefits of treatment), the patient (family) agrees to implement the following specific plan:  · Start prednisone 60 daily x 7 days ,40 mg daily x 7 days, 20 mg daily x 7 days by mouth for 3 weeks in the morning   · May consider minocycline if flares occur   Will work around tests for lupus        What is contact dermatitis? Contact dermatitis is a type of eczema that results from something coming in contact with the skin  There are 2 types:  irritant and allergic  The majority of cases are from irritation  Usually that is from contact with strong soaps, repeated exposure to water, contact with cleaning agents or food, or friction  The minority are an actual allergy  In these cases something is coming in contact with the skin and causing an allergic reaction, similar to what happens with poison ivy  This usually occurs unexpectedly after using something for many years  Even very tiny amounts of the substance on the skin can cause a reaction  Some common causes are fragrances, preservatives and metals  Sometimes this can occur when an allergic substance is eaten, as well, but most often it is from direct contact with the skin  To determine the cause of allergy a patch test is often done  Some general rules to follow for both types of contact dermatitis are:   Wear gloves when using strong cleansers or before prolonged contact with water (like washing dishes)   Use gentle cleansers and avoid strong soaps   Apply moisturizer to entire body after bathing    Avoid products with fragrance    Most often contact dermatitis is treated with topical medicines like topical steroids, eucrisa, pimecrolimus or tacrolimus     Some times oral steroids, ie prednisone, methylprednisone and prednisolone, are needed  In chronic cases, treatment options include:  light therapy, methotrexate, cyclosporin      Scribe Attestation    I,:  Juan Carlos Parra am acting as a scribe while in the presence of the attending physician :       I,:  Silvana Aguilar MD personally performed the services described in this documentation    as scribed in my presence :

## 2022-02-23 ENCOUNTER — TELEPHONE (OUTPATIENT)
Dept: DERMATOLOGY | Facility: CLINIC | Age: 82
End: 2022-02-23

## 2022-02-23 NOTE — TELEPHONE ENCOUNTER
Pt is calling to discuss with you the possibility to decrease the prednisone earlier than what was discussed, states that she's been getting upset stomach from it  Also, she questioned a refill for the [facility] as she just got off the phone with the pharmacy and they state they don't have the prescription, informed her that it was received by them on 12/15 and that she should call them back to inquire, I did inform her that if there is a problem and they cannot locate that I will have you re-prescribe  Thank you!

## 2022-02-25 NOTE — TELEPHONE ENCOUNTER
Pt was calling to follow up on the message she left on Wednesday for Dr Ky Morrell about her medication  Per Dr Munroe Landing message from yesterday, I advised Pt that she can drop the prednisone by 5 mg every 2 days to get off it sooner

## 2022-03-15 ENCOUNTER — OFFICE VISIT (OUTPATIENT)
Dept: DERMATOLOGY | Age: 82
End: 2022-03-15
Payer: COMMERCIAL

## 2022-03-15 VITALS — WEIGHT: 117 LBS | BODY MASS INDEX: 20.73 KG/M2 | TEMPERATURE: 98.1 F

## 2022-03-15 DIAGNOSIS — L57.0 KERATOSIS, ACTINIC: ICD-10-CM

## 2022-03-15 DIAGNOSIS — L43.9 LICHEN PLANUS: ICD-10-CM

## 2022-03-15 DIAGNOSIS — L65.0 TELOGEN EFFLUVIUM: Primary | ICD-10-CM

## 2022-03-15 PROCEDURE — 17000 DESTRUCT PREMALG LESION: CPT | Performed by: DERMATOLOGY

## 2022-03-15 PROCEDURE — 11900 INJECT SKIN LESIONS </W 7: CPT | Performed by: DERMATOLOGY

## 2022-03-15 NOTE — PATIENT INSTRUCTIONS
ACTINIC KERATOSIS    Assessment and Plan:  Based on a thorough discussion of this condition and the management approach to it (including a comprehensive discussion of the known risks, side effects and potential benefits of treatment), the patient (family) agrees to implement the following specific plan:     Treated with liquid nitrogen in office today   For the spots treated with liquid nitrogen today, expect them to stay red and become crusty over the course of the next 7-10 days, and then the crust should fall off  If you develop a small blister in the area, this is normal  Use Vaseline for irritation  Actinic keratoses are very common on sites repeatedly exposed to the sun, especially the backs of the hands and the face, most often affecting the ears, nose, cheeks, upper lip, vermilion of the lower lip, temples, forehead and balding scalp  In severely chronically sun-damaged individuals, they may also be found on the upper trunk, upper and lower limbs, and dorsum of feet  We discussed the theoretical premalignant (pre-cancerous) nature and etiology of these growths  We discussed the prevailing notion that actinic keratoses are a reflection of abnormal skin cell development due to DNA damage by short wavelength UVB  They are more likely to appear if the immune function is poor, due to aging, recent sun exposure, predisposing disease or certain drugs  We discussed that the main concern is that actinic keratoses may predispose to squamous cell carcinoma  It is rare for a solitary actinic keratosis to evolve to squamous cell carcinoma (SCC), but the risk of SCC occurring at some stage in a patient with more than 10 actinic keratoses is thought to be about 10 to 15%  A tender, thickened, ulcerated or enlarging actinic keratosis is suspicious of SCC  Actinic keratoses may be prevented by strict sun protection   If already present, keratoses may improve with a very high sun protection factor (50+) broad-spectrum sunscreen applied at least daily to affected areas, year-round  We recommend that UPF-rated clothing and hats and sunglasses be worn whenever possible and that a sunscreen-moisturizer combination product such as Neutrogena Daily Defense be applied at least three times a day  We performed a thorough discussion of treatment options and specific risk/benefits/alternatives including but not limited to medical field treatment with medications such as the following:     Topical field area medications such as 5-fluorouracil or Aldara (specifically, the trouble with long-term compliance, blistering and local skin reaction versus the convenience of at-home therapy and that field therapy gets what is not yet seen)   Cryotherapy (specifically, local pain, scarring, dyspigmentation, blistering, possible superinfection, and treats only what we see versus directed treatment today)   Photodynamic therapy (specifically, local pain, scarring, dyspigmentation, blistering, possible superinfection, need to schedule for a later date, and time spent in the office versus field therapy that gets what is not yet seen)  PROCEDURE:  DESTRUCTION OF PRE-MALIGNANT LESIONS  After a thorough discussion of treatment options and risk/benefits/alternatives (including but not limited to local pain, scarring, dyspigmentation, blistering, and possible superinfection), verbal and written consent were obtained and the aforementioned lesions were treated on with cryotherapy using liquid nitrogen x 1 cycle for 5-10 seconds  The patient tolerated the procedure well, and after-care instructions were provided      Follow up [address] with TELOGEN EFFLUVIUM    Assessment and Plan:  Based on a thorough discussion of this condition and the management approach to it (including a comprehensive discussion of the known risks, side effects and potential benefits of treatment), the patient (family) agrees to implement the following specific plan:   Recommend vitamins - cysteine, lysine, zinc, pantogar   Kenalog injections done in office today; written and verbal consent obtained   Continue with minoxidil 2 5 mg by mouth daily   Continue dutasteride 0 5 mg by mouth daily   Follow up in 2 months    What is lichen planus? Lichen planus is a chronic inflammatory skin condition affecting the skin and mucosal surfaces  There are several clinical types of lichen planus that share similar features on histopathology   Cutaneous lichen planus   Mucosal lichen planus   Lichen planopilaris   Lichen planus of the nails   Lichen planus pigmentosus   Lichenoid drug eruption    Who gets lichen planus? Lichen planus affects about one in one hundred people worldwide, mostly affecting adults over the age of 36 years  About half those affected have oral lichen planus, which is more common in women than in men  About 95% have lichen planus of the nails  What causes lichen planus? Lichen planus is a T cell-mediated autoimmune disorder, in which inflammatory cells attack an unknown protein within the skin and mucosal keratinocytes  Contributing factors to lichen planus may include:   Genetic predisposition   Physical and emotional stress   Injury to the skin; lichen planus often appears where the skin has been scratched or after surgery -- this is called the isomorphic response (koebnerisation)   Localized skin disease such as herpes zoster--isotopic response   Systemic viral infection, such as hepatitis C (which might modify self-antigens on the surface of basal keratinocytes)   Contact allergy, such as to metal fillings in oral lichen planus (rare)   Drugs; gold, quinine, quinidine and others can cause a lichenoid rash    A lichenoid inflammation is also notable in [id number] disease, a complication of a bone marrow transplant  What are the clinical features of lichen planus?   Lichen planus may cause a small number or many lesions on the skin and mucosal surfaces  Cutaneous lichen planus  The usual presentation of the disease is classical lichen planus  Symptoms can range from none (uncommon) to intense itch   Papules and polygonal plaques are shiny, flat-topped and firm on palpation   The plaques are crossed by fine white lines called Adelfo striae   Hypertrophic lichen planus can be scaly   Bullous lichen planus is rare   Size ranges from pinpoint to larger than a centimeter   Distribution may be scattered, clustered, linear, annular or actinic (sun-exposed sites such as face, neck and backs of the hands)   Location can be anywhere, but most often front of the wrists, lower back, and ankles   Colour depends on the patient's skin type  New papules and plaques often have a purple or violet hue, except on palms and soles where they are yellowish brown   Plaques resolve after some months to leave greyish-brown post-inflammatory macules that can take a year or longer to fade  Oral lichen planus  The mouth is often the only affected area  Oral lichen planus often involves the inside of the cheeks and the sides of the tongue, but the gums and lips may also be involved  The most common patterns are:   Painless white streaks in a lacy or fern-like pattern   Painful and persistent erosions and ulcers (erosive lichen planus)   Diffuse redness and peeling of the gums (desquamative gingivitis)   Localized inflammation of the gums adjacent to amalgam fillings    Vulval lichen planus  Lichen planus may affect labia majora, labia minora and vaginal introitus  Presentation includes:   Painless white streaks in a lacy or fern-like pattern   Painful and persistent erosions and ulcers (erosive lichen planus )   Scarring, resulting in adhesions, resorption of labia minora and introital stenosis   Painful desquamative vaginitis, preventing intercourse and causing a mucky vaginal discharge   The eroded vagina may bleed easily on contact   Overlap with vulval lichen sclerosus, an inflammatory skin disorder that most commonly affects women over 48years of age  Penile lichen planus   Penile lichen planus usually presents with classical papules in a ring around the glans  White streaks and erosive lichen planus may occur but are less common  Other mucosal sites   Erosive lichen planus uncommonly affects the lacrimal glands, eyelids, external ear canal, oesophagus, larynx, bladder and anus  Lichen planopilaris   Lichen planopilaris presents as tiny red spiny follicular papules on the scalp or less often, elsewhere on the body  Rarely, blistering occurs in the lesions  Destruction of the hair follicles leads to permanently bald patches characterized by sparse lonely hairs   Frontal fibrosing alopecia is a form of lichen planopilaris that affects the anterior scalp, forehead and eyebrows   Pseudopelade of Brocq is probably a variant of lichen planus without inflammation or scaling  Areas of scarring without hair slowly appear, described as like footprints in the snow  Lichen planus pigmentosus   Lichen planus pigmentosus describes ill-defined oval, greyish brown marks on the face and neck or trunk and limbs without an inflammatory phase  It is a form of acquired dermal macular hyperpigmentation  It can be provoked by sun exposure, but it can also arise in sun-protected sites such as the armpits  It has diffuse, reticulate and diffuse patterns  Lichen planus pigmentosus is similar to erythema dyschromicum perstans and may be the same disease   Lichen planus pigmentosus may rarely affect the lips, resulting in a patchy dark pigmentation on upper and lower lips  Lichenoid drug eruption   Lichenoid drug eruption refers to a lichen planus-like rash caused by medications  Asymptomatic or itchy; pink, brown or purple; flat, slightly scaly patches most often arise on the trunk   The oral mucosa (oral lichenoid reaction) and other sites are also sometimes affected  Many drugs can rarely cause lichenoid eruptions  The most common are:   Gold   Hydroxychloroquine   Captopril    What are the complications of lichen planus? Hypertrophic lichen planus may resemble squamous cell carcinoma  However, rarely, longstanding erosive lichen planus can result in true squamous cell carcinoma, most often in the mouth (oral cancer) or on the vulva (vulval cancer) or penis (penile cancer)  This should be suspected if there is an enlarging nodule or an ulcer with thickened edges in these sites  Cancer is more common in smokers, those with a history of cancer in mucosal sites, and in those who carry sexually acquired and oncogenic human papillomavirus  Cancer from other forms of lichen planus is rare  How is lichen planus diagnosed? In most cases, lichen planus is diagnosed by observing its clinical features  A biopsy is often recommended to confirm or make the diagnosis and to look for cancer  The histopathological signs are of a lichenoid tissue reaction affecting the epidermis  Typical features include:   Irregularly thickened epidermis   Degenerative skin cells   Liquefaction degeneration of the basal layer of the epidermis   Band of inflammatory cells just beneath the epidermis   Melanin (pigment) beneath the epidermis    Direct immunofluorescent staining may reveal deposits of immunoglobulins at the base of the epidermis  Patch tests may be recommended for patients with oral lichen planus affecting the gums and who have amalgam fillings, to assess for contact allergy to thiomersal (a mercurial compound)  What is the treatment for lichen planus? Treatment is not always necessary   Local treatments for symptomatic cutaneous or mucosal disease are:   Potent topical steroids   Topical calcineurin inhibitors, tacrolimus ointment and pimecrolimus cream   Topical retinoids   Intralesional steroid injections    Systemic treatment for widespread lichen planus or severe local disease often includes a 1 to 3-month course of oral prednisone, while commencing another agent from the following list:   Acitretin   Hydroxychloroquine   Methotrexate   Azathioprine   Mycophenolate mofetil   Phototherapy    In cases of oral lichen planus affecting the gums with contact allergy to mercury, the lichen planus may resolve on replacing the fillings with composite material  If the lichen planus is not due to mercury allergy, removing amalgam fillings is very unlikely to result in a cure  Anecdotal success is reported from long courses of oral antibiotics and oral antifungal agents  Lichen planopilaris is reported to improve with pioglitazone  What is the outlook for lichen planus? Cutaneous lichen planus tends to clear within a couple of years in most people, but mucosal lichen planus is more likely to persist for a decade or longer  Spontaneous recovery is unpredictable, and lichen planus may recur at a later date  Scarring is permanent, including balding of the scalp  PROCEDURE:  INTRALESIONAL STEROID INJECTION (KENALOG INJECTION)    Purpose: Triamcinolone is a synthetic glucocorticoid corticosteroid that has marked anti-inflammatory action  It is prepared in sterile aqueous suspension suitable for injecting directly into a lesion on or immediately below the skin to treat a dermal inflammatory process  Indications: It is indicated for alopecia areata; inflammatory acne cysts; discoid lupus erythematosus; keloids and hypertrophic scars; inflammatory lesions of granuloma annulare, lichen planus, lichen simplex chronicus (neurodermatitis), psoriatic plaques, and other localized inflammatory skin conditions       Potential Side Effects: I understand that triamcinolone injection can potentially cause early and/or delayed adverse effects such as:    Pain    Impaired wound healing    Increased hair growth    Bleeding    White or brown marks    Steroid acne    Infection    Telangiectasia    Skin thinning    Cutaneous and subcutaneous lipoatrophy (most common) appearing as skin indentations or dimples around the injection sites a few weeks after treatment     PROCEDURE NOTE:  After verbal and written consent were obtained, the to-be-treated area was wiped and cleaned with rubbing alcohol 70%  There was less than 1 mL of blood loss and little to no discomfort  The area was bandaged with a Band-aid  The patient tolerated the procedure well and remained in the office for observation  With no signs of an adverse reaction, the patient was eventually discharged from clinic

## 2022-03-15 NOTE — PROGRESS NOTES
[facility] Follow Up Note    Patient Name: Tiffanie Cloud  Encounter Date: 03/15/22    Today's Chief Concerns:  Amanda Carnes Concern #1:  Lichen planus f/u    Current Medications:    Current Outpatient Medications:     b complex vitamins capsule, Take 1 capsule by mouth daily, Disp: , Rfl:     calcipotriene (DOVONEX) 0 005 % topical solution, Apply 1 application topically 2 (two) times a day, Disp: 60 mL, Rfl: 2    cycloSPORINE (RESTASIS) 0 05 % ophthalmic emulsion, 1 drop by Tube route 2 (two) times a day, Disp: , Rfl:     dutasteride (AVODART) 0 5 mg capsule, TAKE ONE CAPSULE BY MOUTH EVERY DAY, Disp: 90 capsule, Rfl: 2    estradiol (ESTRACE) 0 1 mg/g vaginal cream, Insert into the vagina 2 (two) times a week, Disp: , Rfl:     Ivermectin (Soolantra) 1 % CREA, Apply topically 1-2 times a day to face area as needed for rosacea , Disp: 45 g, Rfl: 5    Lutein 40 MG CAPS, Take by mouth daily  , Disp: , Rfl:     Magnesium 500 MG CAPS, Take by mouth, Disp: , Rfl:     Magnesium Gluconate 550 MG TABS, Take 30 mg by mouth 2 (two) times a day  , Disp: , Rfl:     metoprolol succinate (TOPROL-XL) 25 mg 24 hr tablet, Take 25 mg by mouth daily, Disp: , Rfl:     minoxidil (LONITEN) 2 5 mg tablet, Take 1/2 a tablet by mouth once daily, Disp: 90 tablet, Rfl: 2    Cholecalciferol-Vitamin C 1000-500 UNIT-MG CAPS, Take by mouth, Disp: , Rfl:     cholestyramine (Questran) 4 g packet, cholestyramine 4 gm pack (Patient not taking: Reported on 3/15/2022), Disp: , Rfl:     CHOLESTYRAMINE PO, Take by mouth, Disp: , Rfl:     clobetasol (TEMOVATE) 0 05 % cream, Apply topically 2 (two) times a day, Disp: , Rfl:     clobetasol (TEMOVATE) 0 05 % external solution, APPLY TOPICALLY TWO TIMES A DAY (GENERIC FOR TEMOVATE), Disp: 50 mL, Rfl: 3    dextromethorphan 15 MG/5ML syrup, Take 15 mg one time with methotrexate weekly (Patient not taking: Reported on 11/23/2021 ), Disp: 120 mL, Rfl: 0    folic acid (FOLVITE) 1 mg tablet, Take one tablet daily while on methotrexate to prevent side effects, Disp: 90 tablet, Rfl: 2    hydrocortisone 2 5 % cream, apply topically twice a day to affected areas on face for 2 weeks (Patient not taking: Reported on 7/22/2021), Disp: 30 g, Rfl: 1    methotrexate 2 5 mg tablet, Take 3 tablets at once week one, repeat lab work and wait for results for taking second dose week 2, Disp: 3 tablet, Rfl: 1    methotrexate 2 5 mg tablet, Start taking 4 pills once weekly and increase by 1 pill weekly until 6 pills once weekly is reached, continue at 6 pills once weekly until follow up appointment in 3 months, Disp: 72 tablet, Rfl: 1    metoprolol succinate (TOPROL-XL) 50 mg 24 hr tablet, Take 50 mg by mouth daily (Patient not taking: Reported on 7/22/2021), Disp: , Rfl:     Multiple Vitamin-Folic Acid TABS, Take by mouth, Disp: , Rfl:     CONSTITUTIONAL:   Vitals:    03/15/22 1528   Temp: 98 1 °F (36 7 °C)   TempSrc: Temporal   Weight: 53 1 kg (117 lb)       Specific Alerts:    Have you been seen by a [facility] Dermatologist in the last 3 years? YES    Are you pregnant or planning to become pregnant? No    Are you currently or planning to be nursing or breast feeding? No    Allergies   Allergen Reactions    Dapsone Facial Swelling    Epinephrine     Mupirocin     Seasonal Ic [Cholestatin] Itching    Cephalosporins Rash       May we call your Preferred Phone number to discuss your specific medical information? YES    May we leave a detailed message that includes your specific medical information? YES    Have you traveled outside of the [facility] in the past 3 months? No    Do you currently have a pacemaker or defibrillator? No    Do you have any artificial heart valves, joints, plates, screws, rods, stents, pins, etc? No   - If Yes, were any placed within the last 2 years? Do you require any medications prior to a surgical procedure? No   - If Yes, for which procedure?     - If Yes, what medications to you require? Are you taking any medications that cause you to bleed more easily ("blood thinners") No    Have you ever experienced a rapid heartbeat with epinephrine? YES    Review of Systems:  Have you recently had or currently have any of the following?     · Fever or chills: No  · Night Sweats: No  · Headaches: No  · Weight Gain: No  · Weight Loss: No  · Blurry Vision: No  · Nausea: No  · Vomiting: No  · Diarrhea: No  · Blood in Stool: No  · Abdominal Pain: No  · Itchy Skin: No  · Painful Joints: No  · Swollen Joints: No  · Muscle Pain: No  · Irregular Mole: No  · Sun Burn: No  · Dry Skin: No  · Skin Color Changes: No  · Scar or Keloid: No  · Cold Sores/Fever Blisters: No  · Bacterial Infections/MRSA: No  · Anxiety: No  · Depression: No  · Suicidal or Homicidal Thoughts: No      PSYCH: Normal mood and affect  EYES: Normal conjunctiva  ENT: Normal lips and oral mucosa  CARDIOVASCULAR: No edema  RESPIRATORY: Normal respirations  HEME/LYMPH/IMMUNO:  No regional lymphadenopathy except as noted below in ASSESSMENT AND PLAN BY DIAGNOSIS    FULL ORGAN SYSTEM SKIN EXAM (SKIN)   Hair, Scalp, Ears, Face Normal except as noted below in Assessment   Neck, Cervical Chain Nodes Normal except as noted below in Assessment   Right Arm/Hand/Fingers Normal except as noted below in Assessment   Left Arm/Hand/Fingers Normal except as noted below in Assessment   Chest/Breasts/Axillae Viewed areas Normal except as noted below in Assessment   Abdomen, Umbilicus Normal except as noted below in Assessment   Back/Spine Normal except as noted below in Assessment   Groin/Genitalia/Buttocks Viewed areas Normal except as noted below in Assessment   Right Leg, Foot, Toes Normal except as noted below in Assessment   Left Leg, Foot, Toes Normal except as noted below in Assessment       ACTINIC KERATOSIS    Physical Exam:   Anatomic Location Affected:  Right cheek   Morphological Description:  Scaly pink papules    Additional History of Present Condition:  Present on exam      Assessment and Plan:  Based on a thorough discussion of this condition and the management approach to it (including a comprehensive discussion of the known risks, side effects and potential benefits of treatment), the patient (family) agrees to implement the following specific plan:     Treated with liquid nitrogen in office today   For the spots treated with liquid nitrogen today, expect them to stay red and become crusty over the course of the next 7-10 days, and then the crust should fall off  If you develop a small blister in the area, this is normal  Use Vaseline for irritation  Actinic keratoses are very common on sites repeatedly exposed to the sun, especially the backs of the hands and the face, most often affecting the ears, nose, cheeks, upper lip, vermilion of the lower lip, temples, forehead and balding scalp  In severely chronically sun-damaged individuals, they may also be found on the upper trunk, upper and lower limbs, and dorsum of feet  We discussed the theoretical premalignant (pre-cancerous) nature and etiology of these growths  We discussed the prevailing notion that actinic keratoses are a reflection of abnormal skin cell development due to DNA damage by short wavelength UVB  They are more likely to appear if the immune function is poor, due to aging, recent sun exposure, predisposing disease or certain drugs  We discussed that the main concern is that actinic keratoses may predispose to squamous cell carcinoma  It is rare for a solitary actinic keratosis to evolve to squamous cell carcinoma (SCC), but the risk of SCC occurring at some stage in a patient with more than 10 actinic keratoses is thought to be about 10 to 15%  A tender, thickened, ulcerated or enlarging actinic keratosis is suspicious of SCC  Actinic keratoses may be prevented by strict sun protection   If already present, keratoses may improve with a very high sun protection factor (50+) broad-spectrum sunscreen applied at least daily to affected areas, year-round  We recommend that UPF-rated clothing and hats and sunglasses be worn whenever possible and that a sunscreen-moisturizer combination product such as Neutrogena Daily Defense be applied at least three times a day  We performed a thorough discussion of treatment options and specific risk/benefits/alternatives including but not limited to medical field treatment with medications such as the following:     Topical field area medications such as 5-fluorouracil or Aldara (specifically, the trouble with long-term compliance, blistering and local skin reaction versus the convenience of at-home therapy and that field therapy gets what is not yet seen)   Cryotherapy (specifically, local pain, scarring, dyspigmentation, blistering, possible superinfection, and treats only what we see versus directed treatment today)   Photodynamic therapy (specifically, local pain, scarring, dyspigmentation, blistering, possible superinfection, need to schedule for a later date, and time spent in the office versus field therapy that gets what is not yet seen)  PROCEDURE:  DESTRUCTION OF PRE-MALIGNANT LESIONS  After a thorough discussion of treatment options and risk/benefits/alternatives (including but not limited to local pain, scarring, dyspigmentation, blistering, and possible superinfection), verbal and written consent were obtained and the aforementioned lesions were treated on with cryotherapy using liquid nitrogen x 1 cycle for 5-10 seconds   TOTAL NUMBER of 1 pre-malignant lesions were treated today on the ANATOMIC LOCATION: Right cheek  The patient tolerated the procedure well, and after-care instructions were provided      Follow up [address] with TELOGEN EFFLUVIUM    Physical Exam:   Anatomic Location Affected/Morphological Description:  One active patch on left parietal scalp   Pertinent Positives:   Pertinent Negatives: Additional History of Present Condition:  Patient has been taking minoxidil and dutasteride daily  States her scalp has been itchy and scaly  Assessment and Plan:  Based on a thorough discussion of this condition and the management approach to it (including a comprehensive discussion of the known risks, side effects and potential benefits of treatment), the patient (family) agrees to implement the following specific plan:   Recommend vitamins - cysteine, lysine, zinc, pantogar   Kenalog injections done in office today; written and verbal consent obtained   Continue with minoxidil 2 5 mg by mouth daily   Continue dutasteride 0 5 mg by mouth daily   Follow up in 2 months    What is lichen planus? Lichen planus is a chronic inflammatory skin condition affecting the skin and mucosal surfaces  There are several clinical types of lichen planus that share similar features on histopathology   Cutaneous lichen planus   Mucosal lichen planus   Lichen planopilaris   Lichen planus of the nails   Lichen planus pigmentosus   Lichenoid drug eruption    Who gets lichen planus? Lichen planus affects about one in one hundred people worldwide, mostly affecting adults over the age of 36 years  About half those affected have oral lichen planus, which is more common in women than in men  About 67% have lichen planus of the nails  What causes lichen planus? Lichen planus is a T cell-mediated autoimmune disorder, in which inflammatory cells attack an unknown protein within the skin and mucosal keratinocytes    Contributing factors to lichen planus may include:   Genetic predisposition   Physical and emotional stress   Injury to the skin; lichen planus often appears where the skin has been scratched or after surgery -- this is called the isomorphic response (koebnerisation)   Localized skin disease such as herpes zoster--isotopic response   Systemic viral infection, such as hepatitis C (which might modify self-antigens on the surface of basal keratinocytes)   Contact allergy, such as to metal fillings in oral lichen planus (rare)   Drugs; gold, quinine, quinidine and others can cause a lichenoid rash    A lichenoid inflammation is also notable in [id number] disease, a complication of a bone marrow transplant  What are the clinical features of lichen planus? Lichen planus may cause a small number or many lesions on the skin and mucosal surfaces  Cutaneous lichen planus  The usual presentation of the disease is classical lichen planus  Symptoms can range from none (uncommon) to intense itch   Papules and polygonal plaques are shiny, flat-topped and firm on palpation   The plaques are crossed by fine white lines called Adelfo striae   Hypertrophic lichen planus can be scaly   Bullous lichen planus is rare   Size ranges from pinpoint to larger than a centimeter   Distribution may be scattered, clustered, linear, annular or actinic (sun-exposed sites such as face, neck and backs of the hands)   Location can be anywhere, but most often front of the wrists, lower back, and ankles   Colour depends on the patient's skin type  New papules and plaques often have a purple or violet hue, except on palms and soles where they are yellowish brown   Plaques resolve after some months to leave greyish-brown post-inflammatory macules that can take a year or longer to fade  Oral lichen planus  The mouth is often the only affected area  Oral lichen planus often involves the inside of the cheeks and the sides of the tongue, but the gums and lips may also be involved   The most common patterns are:   Painless white streaks in a lacy or fern-like pattern   Painful and persistent erosions and ulcers (erosive lichen planus)   Diffuse redness and peeling of the gums (desquamative gingivitis)   Localized inflammation of the gums adjacent to amalgam fillings    Vulval lichen planus  Lichen planus may affect labia majora, labia minora and vaginal introitus  Presentation includes:   Painless white streaks in a lacy or fern-like pattern   Painful and persistent erosions and ulcers (erosive lichen planus )   Scarring, resulting in adhesions, resorption of labia minora and introital stenosis   Painful desquamative vaginitis, preventing intercourse and causing a mucky vaginal discharge  The eroded vagina may bleed easily on contact   Overlap with vulval lichen sclerosus, an inflammatory skin disorder that most commonly affects women over 48years of age  Penile lichen planus   Penile lichen planus usually presents with classical papules in a ring around the glans  White streaks and erosive lichen planus may occur but are less common  Other mucosal sites   Erosive lichen planus uncommonly affects the lacrimal glands, eyelids, external ear canal, oesophagus, larynx, bladder and anus  Lichen planopilaris   Lichen planopilaris presents as tiny red spiny follicular papules on the scalp or less often, elsewhere on the body  Rarely, blistering occurs in the lesions  Destruction of the hair follicles leads to permanently bald patches characterized by sparse lonely hairs   Frontal fibrosing alopecia is a form of lichen planopilaris that affects the anterior scalp, forehead and eyebrows   Pseudopelade of Brocq is probably a variant of lichen planus without inflammation or scaling  Areas of scarring without hair slowly appear, described as like footprints in the snow  Lichen planus pigmentosus   Lichen planus pigmentosus describes ill-defined oval, greyish brown marks on the face and neck or trunk and limbs without an inflammatory phase  It is a form of acquired dermal macular hyperpigmentation  It can be provoked by sun exposure, but it can also arise in sun-protected sites such as the armpits  It has diffuse, reticulate and diffuse patterns  Lichen planus pigmentosus is similar to erythema dyschromicum perstans and may be the same disease   Lichen planus pigmentosus may rarely affect the lips, resulting in a patchy dark pigmentation on upper and lower lips  Lichenoid drug eruption   Lichenoid drug eruption refers to a lichen planus-like rash caused by medications  Asymptomatic or itchy; pink, brown or purple; flat, slightly scaly patches most often arise on the trunk  The oral mucosa (oral lichenoid reaction) and other sites are also sometimes affected  Many drugs can rarely cause lichenoid eruptions  The most common are:   Gold   Hydroxychloroquine   Captopril    What are the complications of lichen planus? Hypertrophic lichen planus may resemble squamous cell carcinoma  However, rarely, longstanding erosive lichen planus can result in true squamous cell carcinoma, most often in the mouth (oral cancer) or on the vulva (vulval cancer) or penis (penile cancer)  This should be suspected if there is an enlarging nodule or an ulcer with thickened edges in these sites  Cancer is more common in smokers, those with a history of cancer in mucosal sites, and in those who carry sexually acquired and oncogenic human papillomavirus  Cancer from other forms of lichen planus is rare  How is lichen planus diagnosed? In most cases, lichen planus is diagnosed by observing its clinical features  A biopsy is often recommended to confirm or make the diagnosis and to look for cancer  The histopathological signs are of a lichenoid tissue reaction affecting the epidermis  Typical features include:   Irregularly thickened epidermis   Degenerative skin cells   Liquefaction degeneration of the basal layer of the epidermis   Band of inflammatory cells just beneath the epidermis   Melanin (pigment) beneath the epidermis    Direct immunofluorescent staining may reveal deposits of immunoglobulins at the base of the epidermis      Patch tests may be recommended for patients with oral lichen planus affecting the gums and who have amalgam fillings, to assess for contact allergy to thiomersal (a mercurial compound)  What is the treatment for lichen planus? Treatment is not always necessary  Local treatments for symptomatic cutaneous or mucosal disease are:   Potent topical steroids   Topical calcineurin inhibitors, tacrolimus ointment and pimecrolimus cream   Topical retinoids   Intralesional steroid injections    Systemic treatment for widespread lichen planus or severe local disease often includes a 1 to 3-month course of oral prednisone, while commencing another agent from the following list:   Acitretin   Hydroxychloroquine   Methotrexate   Azathioprine   Mycophenolate mofetil   Phototherapy    In cases of oral lichen planus affecting the gums with contact allergy to mercury, the lichen planus may resolve on replacing the fillings with composite material  If the lichen planus is not due to mercury allergy, removing amalgam fillings is very unlikely to result in a cure  Anecdotal success is reported from long courses of oral antibiotics and oral antifungal agents  Lichen planopilaris is reported to improve with pioglitazone  What is the outlook for lichen planus? Cutaneous lichen planus tends to clear within a couple of years in most people, but mucosal lichen planus is more likely to persist for a decade or longer  Spontaneous recovery is unpredictable, and lichen planus may recur at a later date  Scarring is permanent, including balding of the scalp  PROCEDURE:  INTRALESIONAL STEROID INJECTION (KENALOG INJECTION)    Purpose: Triamcinolone is a synthetic glucocorticoid corticosteroid that has marked anti-inflammatory action  It is prepared in sterile aqueous suspension suitable for injecting directly into a lesion on or immediately below the skin to treat a dermal inflammatory process  Indications:  It is indicated for alopecia areata; inflammatory acne cysts; discoid lupus erythematosus; keloids and hypertrophic scars; inflammatory lesions of granuloma annulare, lichen planus, lichen simplex chronicus (neurodermatitis), psoriatic plaques, and other localized inflammatory skin conditions  Potential Side Effects: I understand that triamcinolone injection can potentially cause early and/or delayed adverse effects such as:    Pain    Impaired wound healing    Increased hair growth    Bleeding    White or brown marks    Steroid acne    Infection    Telangiectasia    Skin thinning    Cutaneous and subcutaneous lipoatrophy (most common) appearing as skin indentations or dimples around the injection sites a few weeks after treatment     PROCEDURE NOTE:  After verbal and written consent were obtained, the to-be-treated area was wiped and cleaned with rubbing alcohol 70%  Then, a total of 1 mL of Kenalog CONCENTRATION:  2 5 mg/mL (diluted with saline) (Lot# [id number]; Expiration March 2023, NDC#: 9262-9508-70) was injected intralesionally into a total of 1 lesion/s on the following anatomic areas:  Left parietal scalp using a 1-mL syringe and a 30-gauge needle  There was less than 1 mL of blood loss and little to no discomfort  The area was bandaged with a Band-aid  The patient tolerated the procedure well and remained in the office for observation  With no signs of an adverse reaction, the patient was eventually discharged from clinic        Scribe Attestation    I,:  Cong Arciniega am acting as a scribe while in the presence of the attending physician :       I,:  Dana Cr MD personally performed the services described in this documentation    as scribed in my presence :

## 2022-04-18 DIAGNOSIS — L66.1 LICHEN PLANOPILARIS: ICD-10-CM

## 2022-04-18 RX ORDER — FOLIC ACID 1 MG/1
TABLET ORAL
Qty: 90 TABLET | Refills: 2 | Status: SHIPPED | OUTPATIENT
Start: 2022-04-18

## 2022-05-12 ENCOUNTER — OFFICE VISIT (OUTPATIENT)
Dept: DERMATOLOGY | Age: 82
End: 2022-05-12
Payer: COMMERCIAL

## 2022-05-12 VITALS — WEIGHT: 119 LBS | TEMPERATURE: 98.1 F | HEIGHT: 63 IN | BODY MASS INDEX: 21.09 KG/M2

## 2022-05-12 DIAGNOSIS — L63.9 ALOPECIA AREATA: ICD-10-CM

## 2022-05-12 DIAGNOSIS — L66.1 LICHEN PLANOPILARIS: ICD-10-CM

## 2022-05-12 DIAGNOSIS — T14.8XXA SKIN AVULSION: ICD-10-CM

## 2022-05-12 DIAGNOSIS — L65.0 TELOGEN EFFLUVIUM: Primary | ICD-10-CM

## 2022-05-12 PROCEDURE — 99214 OFFICE O/P EST MOD 30 MIN: CPT | Performed by: DERMATOLOGY

## 2022-05-12 NOTE — PROGRESS NOTES
[facility] Follow Up Note    Patient Name: Josemanuel Gann  Encounter Date: 05/12/2022    Today's Chief Concerns:  [facility] Concern #1:  Follow up on lichen planus       Current Medications:    Current Outpatient Medications:     b complex vitamins capsule, Take 1 capsule by mouth daily, Disp: , Rfl:     cycloSPORINE (RESTASIS) 0 05 % ophthalmic emulsion, 1 drop by Tube route 2 (two) times a day, Disp: , Rfl:     dutasteride (AVODART) 0 5 mg capsule, TAKE ONE CAPSULE BY MOUTH EVERY DAY, Disp: 90 capsule, Rfl: 2    estradiol (ESTRACE) 0 1 mg/g vaginal cream, Insert into the vagina 2 (two) times a week, Disp: , Rfl:     folic acid (FOLVITE) 1 mg tablet, TAKE ONE TABLET BY MOUTH EVERY DAY WHILE ON METHOTREXATE TO PREVENT SIDE EFFECTS (GENERIC FOR FOLVITE), Disp: 90 tablet, Rfl: 2    Ivermectin (Soolantra) 1 % CREA, Apply topically 1-2 times a day to face area as needed for rosacea , Disp: 45 g, Rfl: 5    Lutein 40 MG CAPS, Take by mouth daily  , Disp: , Rfl:     Magnesium 500 MG CAPS, Take by mouth, Disp: , Rfl:     Magnesium Gluconate 550 MG TABS, Take 30 mg by mouth 2 (two) times a day  , Disp: , Rfl:     metoprolol succinate (TOPROL-XL) 25 mg 24 hr tablet, Take 25 mg by mouth in the morning , Disp: , Rfl:     minoxidil (LONITEN) 2 5 mg tablet, Take 1/2 a tablet by mouth once daily, Disp: 90 tablet, Rfl: 2    calcipotriene (DOVONEX) 0 005 % topical solution, Apply 1 application topically 2 (two) times a day, Disp: 60 mL, Rfl: 2    Cholecalciferol-Vitamin C 1000-500 UNIT-MG CAPS, Take by mouth, Disp: , Rfl:     cholestyramine (Questran) 4 g packet, cholestyramine 4 gm pack (Patient not taking: Reported on 3/15/2022), Disp: , Rfl:     CHOLESTYRAMINE PO, Take by mouth, Disp: , Rfl:     clobetasol (TEMOVATE) 0 05 % cream, Apply topically 2 (two) times a day, Disp: , Rfl:     clobetasol (TEMOVATE) 0 05 % external solution, APPLY TOPICALLY TWO TIMES A DAY (GENERIC FOR TEMOVATE), Disp: 50 mL, Rfl: 3    dextromethorphan 15 MG/5ML syrup, Take 15 mg one time with methotrexate weekly (Patient not taking: No sig reported), Disp: 120 mL, Rfl: 0    hydrocortisone 2 5 % cream, apply topically twice a day to affected areas on face for 2 weeks (Patient not taking: Reported on 7/22/2021), Disp: 30 g, Rfl: 1    methotrexate 2 5 mg tablet, Start taking 4 pills once weekly and increase by 1 pill weekly until 6 pills once weekly is reached, continue at 6 pills once weekly until follow up appointment in 3 months, Disp: 72 tablet, Rfl: 1    metoprolol succinate (TOPROL-XL) 50 mg 24 hr tablet, Take 50 mg by mouth daily (Patient not taking: Reported on 7/22/2021), Disp: , Rfl:     Multiple Vitamin-Folic Acid TABS, Take by mouth, Disp: , Rfl:     CONSTITUTIONAL:   Vitals:    05/12/22 0918   Temp: 98 1 °F (36 7 °C)   TempSrc: Temporal   Weight: 54 kg (119 lb)   Height: 5' 3" (1 6 m)       Specific Alerts:    Have you been seen by a [facility] Dermatologist in the last 3 years? YES    Are you pregnant or planning to become pregnant? No    Are you currently or planning to be nursing or breast feeding? No    Allergies   Allergen Reactions    Dapsone Facial Swelling    Epinephrine     Mupirocin     Seasonal Ic [Cholestatin] Itching    Cephalosporins Rash       May we call your Preferred Phone number to discuss your specific medical information? YES    May we leave a detailed message that includes your specific medical information? YES    Have you traveled outside of the [facility] in the past 3 months? No    Do you currently have a pacemaker or defibrillator? No    Do you have any artificial heart valves, joints, plates, screws, rods, stents, pins, etc? No   - If Yes, were any placed within the last 2 years? Do you require any medications prior to a surgical procedure?  No       Are you taking any medications that cause you to bleed more easily ("blood thinners") No    Have you ever experienced a rapid heartbeat with epinephrine? No    Have you ever been treated with "gold" (gold sodium thiomalate) therapy? No    [facility] can help with wrinkles, "laugh lines," facial volume loss, "double chin," "love handles," age spots, and more  Are you interested in learning today about some of the skin enhancement procedures that we offer? (If Yes, please provide more detail) No    Review of Systems:  Have you recently had or currently have any of the following?     · Fever or chills: No  · Night Sweats: No  · Headaches: No  · Weight Gain: No  · Weight Loss: No  · Blurry Vision: No  · Nausea: No  · Vomiting: No  · Diarrhea: No  · Blood in Stool: No  · Abdominal Pain: No  · Itchy Skin: No  · Painful Joints: No  · Swollen Joints: No  · Muscle Pain: No  · Irregular Mole: No  · Sun Burn: No  · Dry Skin: No  · Skin Color Changes: No  · Scar or Keloid: No  · Cold Sores/Fever Blisters: No  · Bacterial Infections/MRSA: No  · Anxiety: No  · Depression: No  · Suicidal or Homicidal Thoughts: No      PSYCH: Normal mood and affect  EYES: Normal conjunctiva  ENT: Normal lips and oral mucosa  CARDIOVASCULAR: No edema  RESPIRATORY: Normal respirations  HEME/LYMPH/IMMUNO:  No regional lymphadenopathy except as noted below in ASSESSMENT AND PLAN BY DIAGNOSIS    FULL ORGAN SYSTEM SKIN EXAM (SKIN)   Hair, Scalp, Ears, Face Normal except as noted below in Assessment   Neck, Cervical Chain Nodes Normal except as noted below in Assessment   Right Arm/Hand/Fingers Normal except as noted below in Assessment   Left Arm/Hand/Fingers Normal except as noted below in Assessment   Chest/Breasts/Axillae Viewed areas Normal except as noted below in Assessment   Abdomen, Umbilicus Normal except as noted below in Assessment   Back/Spine Normal except as noted below in Assessment   Groin/Genitalia/Buttocks Viewed areas Normal except as noted below in Assessment   Right Leg, Foot, Toes Normal except as noted below in Assessment   Left Leg, Foot, Toes Normal except as noted below in Assessment       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 UP     Physical Exam:   Anatomic Location Affected:  Left parietal scalp    Morphological Description:  Erythema new growth noted no scale    Pertinent Positives:   Pertinent Negatives: Additional History of Present Condition:  Kenalog injections done previous visit  Also taking minoxidil 2 5 daily, dutasteride 0 5 daily  Much improved  She stated she has see some new growth     Assessment and Plan:  Based on a thorough discussion of this condition and the management approach to it (including a comprehensive discussion of the known risks, side effects and potential benefits of treatment), the patient (family) agrees to implement the following specific plan:   Will hold off on kenalog injections today    Continue dutasteride 0 5 mg by mouth daily as directed    Continue Minoxidil 2 5 mg once a day by mouth as directed    Follow up in 3-4 months     What is lichen planus? Lichen planus is a chronic inflammatory skin condition affecting the skin and mucosal surfaces  There are several clinical types of lichen planus that share similar features on histopathology   Cutaneous lichen planus   Mucosal lichen planus   Lichen planopilaris   Lichen planus of the nails   Lichen planus pigmentosus   Lichenoid drug eruption    Who gets lichen planus? Lichen planus affects about one in one hundred people worldwide, mostly affecting adults over the age of 36 years  About half those affected have oral lichen planus, which is more common in women than in men  About 56% have lichen planus of the nails  What causes lichen planus? Lichen planus is a T cell-mediated autoimmune disorder, in which inflammatory cells attack an unknown protein within the skin and mucosal keratinocytes    Contributing factors to lichen planus may include:   Genetic predisposition   Physical and emotional stress   Injury to the skin; lichen planus often appears where the skin has been scratched or after surgery -- this is called the isomorphic response (koebnerisation)   Localized skin disease such as herpes zoster--isotopic response   Systemic viral infection, such as hepatitis C (which might modify self-antigens on the surface of basal keratinocytes)   Contact allergy, such as to metal fillings in oral lichen planus (rare)   Drugs; gold, quinine, quinidine and others can cause a lichenoid rash    A lichenoid inflammation is also notable in [id number] disease, a complication of a bone marrow transplant  What are the clinical features of lichen planus? Lichen planus may cause a small number or many lesions on the skin and mucosal surfaces  Cutaneous lichen planus  The usual presentation of the disease is classical lichen planus  Symptoms can range from none (uncommon) to intense itch   Papules and polygonal plaques are shiny, flat-topped and firm on palpation   The plaques are crossed by fine white lines called Adelfo striae   Hypertrophic lichen planus can be scaly   Bullous lichen planus is rare   Size ranges from pinpoint to larger than a centimeter   Distribution may be scattered, clustered, linear, annular or actinic (sun-exposed sites such as face, neck and backs of the hands)   Location can be anywhere, but most often front of the wrists, lower back, and ankles   Colour depends on the patient's skin type  New papules and plaques often have a purple or violet hue, except on palms and soles where they are yellowish brown   Plaques resolve after some months to leave greyish-brown post-inflammatory macules that can take a year or longer to fade  Oral lichen planus  The mouth is often the only affected area  Oral lichen planus often involves the inside of the cheeks and the sides of the tongue, but the gums and lips may also be involved   The most common patterns are:   Painless white streaks in a lacy or fern-like pattern   Painful and persistent erosions and ulcers (erosive lichen planus)   Diffuse redness and peeling of the gums (desquamative gingivitis)   Localized inflammation of the gums adjacent to amalgam fillings    Vulval lichen planus  Lichen planus may affect labia majora, labia minora and vaginal introitus  Presentation includes:   Painless white streaks in a lacy or fern-like pattern   Painful and persistent erosions and ulcers (erosive lichen planus )   Scarring, resulting in adhesions, resorption of labia minora and introital stenosis   Painful desquamative vaginitis, preventing intercourse and causing a mucky vaginal discharge  The eroded vagina may bleed easily on contact   Overlap with vulval lichen sclerosus, an inflammatory skin disorder that most commonly affects women over 48years of age  Penile lichen planus   Penile lichen planus usually presents with classical papules in a ring around the glans  White streaks and erosive lichen planus may occur but are less common  Other mucosal sites   Erosive lichen planus uncommonly affects the lacrimal glands, eyelids, external ear canal, oesophagus, larynx, bladder and anus  Lichen planopilaris   Lichen planopilaris presents as tiny red spiny follicular papules on the scalp or less often, elsewhere on the body  Rarely, blistering occurs in the lesions  Destruction of the hair follicles leads to permanently bald patches characterized by sparse lonely hairs   Frontal fibrosing alopecia is a form of lichen planopilaris that affects the anterior scalp, forehead and eyebrows   Pseudopelade of Brocq is probably a variant of lichen planus without inflammation or scaling  Areas of scarring without hair slowly appear, described as like footprints in the snow      Lichen planus pigmentosus   Lichen planus pigmentosus describes ill-defined oval, greyish brown marks on the face and neck or trunk and limbs without an inflammatory phase  It is a form of acquired dermal macular hyperpigmentation  It can be provoked by sun exposure, but it can also arise in sun-protected sites such as the armpits  It has diffuse, reticulate and diffuse patterns  Lichen planus pigmentosus is similar to erythema dyschromicum perstans and may be the same disease   Lichen planus pigmentosus may rarely affect the lips, resulting in a patchy dark pigmentation on upper and lower lips  Lichenoid drug eruption   Lichenoid drug eruption refers to a lichen planus-like rash caused by medications  Asymptomatic or itchy; pink, brown or purple; flat, slightly scaly patches most often arise on the trunk  The oral mucosa (oral lichenoid reaction) and other sites are also sometimes affected  Many drugs can rarely cause lichenoid eruptions  The most common are:   Gold   Hydroxychloroquine   Captopril    What are the complications of lichen planus? Hypertrophic lichen planus may resemble squamous cell carcinoma  However, rarely, longstanding erosive lichen planus can result in true squamous cell carcinoma, most often in the mouth (oral cancer) or on the vulva (vulval cancer) or penis (penile cancer)  This should be suspected if there is an enlarging nodule or an ulcer with thickened edges in these sites  Cancer is more common in smokers, those with a history of cancer in mucosal sites, and in those who carry sexually acquired and oncogenic human papillomavirus  Cancer from other forms of lichen planus is rare  How is lichen planus diagnosed? In most cases, lichen planus is diagnosed by observing its clinical features  A biopsy is often recommended to confirm or make the diagnosis and to look for cancer  The histopathological signs are of a lichenoid tissue reaction affecting the epidermis    Typical features include:   Irregularly thickened epidermis   Degenerative skin cells   Liquefaction degeneration of the basal layer of the epidermis   Band of inflammatory cells just beneath the epidermis   Melanin (pigment) beneath the epidermis    Direct immunofluorescent staining may reveal deposits of immunoglobulins at the base of the epidermis  Patch tests may be recommended for patients with oral lichen planus affecting the gums and who have amalgam fillings, to assess for contact allergy to thiomersal (a mercurial compound)  What is the treatment for lichen planus? Treatment is not always necessary  Local treatments for symptomatic cutaneous or mucosal disease are:   Potent topical steroids   Topical calcineurin inhibitors, tacrolimus ointment and pimecrolimus cream   Topical retinoids   Intralesional steroid injections    Systemic treatment for widespread lichen planus or severe local disease often includes a 1 to 3-month course of oral prednisone, while commencing another agent from the following list:   Acitretin   Hydroxychloroquine   Methotrexate   Azathioprine   Mycophenolate mofetil   Phototherapy    In cases of oral lichen planus affecting the gums with contact allergy to mercury, the lichen planus may resolve on replacing the fillings with composite material  If the lichen planus is not due to mercury allergy, removing amalgam fillings is very unlikely to result in a cure  Anecdotal success is reported from long courses of oral antibiotics and oral antifungal agents  Lichen planopilaris is reported to improve with pioglitazone  What is the outlook for lichen planus? Cutaneous lichen planus tends to clear within a couple of years in most people, but mucosal lichen planus is more likely to persist for a decade or longer  Spontaneous recovery is unpredictable, and lichen planus may recur at a later date  Scarring is permanent, including balding of the scalp      TRAUMATIC SKIN AVULSION   Physical Exam:   Anatomic Location Affected:  Left knee    Morphological Description:  Bruising crust and thin scar around erouted areas  Pertinent Positives:   Pertinent Negatives:     Additional History of Present Condition:  Wound developed after a fall 2 weeks ago Applying bacitracin at night with bandage     Assessment and Plan:  Based on a thorough discussion of this condition and the management approach to it (including a comprehensive discussion of the known risks, side effects and potential benefits of treatment), the patient (family) agrees to implement the following specific plan:   Start Medi honey twice a day topically to affected wound until improved (samples give)    Advised to keep the area covered during the day and open at night    Reassured to expect healing time to take at least 6-8 weeks     Scribe Attestation    I,:  Robbin Morin am acting as a scribe while in the presence of the attending physician :       I,:  Reggie Reis MD personally performed the services described in this documentation    as scribed in my presence :

## 2022-07-21 ENCOUNTER — OFFICE VISIT (OUTPATIENT)
Dept: DERMATOLOGY | Age: 82
End: 2022-07-21
Payer: COMMERCIAL

## 2022-07-21 VITALS — TEMPERATURE: 97.9 F | WEIGHT: 116 LBS | BODY MASS INDEX: 20.55 KG/M2 | HEIGHT: 63 IN

## 2022-07-21 DIAGNOSIS — L66.1 LICHEN PLANOPILARIS: ICD-10-CM

## 2022-07-21 DIAGNOSIS — L82.0 INFLAMED SEBORRHEIC KERATOSIS: ICD-10-CM

## 2022-07-21 DIAGNOSIS — L90.5 SCAR: Primary | ICD-10-CM

## 2022-07-21 PROCEDURE — 99213 OFFICE O/P EST LOW 20 MIN: CPT | Performed by: DERMATOLOGY

## 2022-07-21 PROCEDURE — 17110 DESTRUCTION B9 LES UP TO 14: CPT | Performed by: DERMATOLOGY

## 2022-07-21 RX ORDER — UBIDECARENONE 75 MG
CAPSULE ORAL DAILY
COMMUNITY

## 2022-07-21 RX ORDER — MELOXICAM 15 MG/1
TABLET ORAL
COMMUNITY
Start: 2022-07-01

## 2022-07-21 NOTE — PATIENT INSTRUCTIONS
SEBORRHEIC KERATOSIS; INFLAMED  Assessment and Plan:  Based on a thorough discussion of this condition and the management approach to it (including a comprehensive discussion of the known risks, side effects and potential benefits of treatment), the patient (family) agrees to implement the following specific plan:  Cryotherapy done in office today   Areas will become red and puffy   Scab up and fall off in 2-3 weeks     SCAR WITH DISCOLORATION     Assessment and Plan:  Based on a thorough discussion of this condition and the management approach to it (including a comprehensive discussion of the known risks, side effects and potential benefits of treatment), the patient (family) agrees to implement the following specific plan:  Laser discussed to portillo  Referral sent for Dr Jesus Bahena for review   Continue Scar away twice a day     LICHEN PLANUS    Assessment and Plan:  Based on a thorough discussion of this condition and the management approach to it (including a comprehensive discussion of the known risks, side effects and potential benefits of treatment), the patient (family) agrees to implement the following specific plan:  Continue dutasteride 0 5 mg by mouth daily as directed   Continue Minoxidil 2 5 mg once a day by mouth as directed   At next visit if still noticing improvement will consider removing minoxidil   Follow up in 3 months

## 2022-07-21 NOTE — PROGRESS NOTES
[facility] Follow Up Note    Patient Name: Colton Fernandez  Encounter Date: 07/21/2022    Today's Chief Concerns:  [facility] Concern #1:  Follow up on lichen planus    Concern #2:  Growth on left and abdomen       Current Medications:    Current Outpatient Medications:     b complex vitamins capsule, Take 1 capsule by mouth daily, Disp: , Rfl:     calcipotriene (DOVONEX) 0 005 % topical solution, Apply 1 application topically 2 (two) times a day, Disp: 60 mL, Rfl: 2    cyanocobalamin (VITAMIN B-12) 100 mcg tablet, Take by mouth daily, Disp: , Rfl:     cycloSPORINE (RESTASIS) 0 05 % ophthalmic emulsion, 1 drop by Tube route 2 (two) times a day, Disp: , Rfl:     dutasteride (AVODART) 0 5 mg capsule, TAKE ONE CAPSULE BY MOUTH EVERY DAY, Disp: 90 capsule, Rfl: 2    estradiol (ESTRACE) 0 1 mg/g vaginal cream, Insert into the vagina 2 (two) times a week, Disp: , Rfl:     folic acid (FOLVITE) 1 mg tablet, TAKE ONE TABLET BY MOUTH EVERY DAY WHILE ON METHOTREXATE TO PREVENT SIDE EFFECTS (GENERIC FOR FOLVITE), Disp: 90 tablet, Rfl: 2    Ivermectin (Soolantra) 1 % CREA, Apply topically 1-2 times a day to face area as needed for rosacea , Disp: 45 g, Rfl: 5    Lutein 40 MG CAPS, Take by mouth daily  , Disp: , Rfl:     Magnesium 500 MG CAPS, Take by mouth, Disp: , Rfl:     Magnesium Gluconate 550 MG TABS, Take 30 mg by mouth 2 (two) times a day  , Disp: , Rfl:     metoprolol succinate (TOPROL-XL) 25 mg 24 hr tablet, Take 25 mg by mouth in the morning , Disp: , Rfl:     minoxidil (LONITEN) 2 5 mg tablet, Take 1/2 a tablet by mouth once daily, Disp: 90 tablet, Rfl: 2    Cholecalciferol-Vitamin C 1000-500 UNIT-MG CAPS, Take by mouth, Disp: , Rfl:     cholestyramine (QUESTRAN) 4 g packet, cholestyramine 4 gm pack (Patient not taking: No sig reported), Disp: , Rfl:     CHOLESTYRAMINE PO, Take by mouth, Disp: , Rfl:     clobetasol (TEMOVATE) 0 05 % cream, Apply topically 2 (two) times a day, Disp: , Rfl:    clobetasol (TEMOVATE) 0 05 % external solution, APPLY TOPICALLY TWO TIMES A DAY (GENERIC FOR TEMOVATE), Disp: 50 mL, Rfl: 3    dextromethorphan 15 MG/5ML syrup, Take 15 mg one time with methotrexate weekly (Patient not taking: No sig reported), Disp: 120 mL, Rfl: 0    hydrocortisone 2 5 % cream, apply topically twice a day to affected areas on face for 2 weeks (Patient not taking: Reported on 7/22/2021), Disp: 30 g, Rfl: 1    meloxicam (MOBIC) 15 mg tablet, , Disp: , Rfl:     methotrexate 2 5 mg tablet, Start taking 4 pills once weekly and increase by 1 pill weekly until 6 pills once weekly is reached, continue at 6 pills once weekly until follow up appointment in 3 months, Disp: 72 tablet, Rfl: 1    metoprolol succinate (TOPROL-XL) 50 mg 24 hr tablet, Take 50 mg by mouth daily (Patient not taking: Reported on 7/22/2021), Disp: , Rfl:     Multiple Vitamin-Folic Acid TABS, Take by mouth, Disp: , Rfl:     CONSTITUTIONAL:   Vitals:    07/21/22 0818   Temp: 97 9 °F (36 6 °C)   TempSrc: Temporal   Weight: 52 6 kg (116 lb)             Specific Alerts:    Have you been seen by a [facility] in the last 3 years? YES    Are you pregnant or planning to become pregnant? No    Are you currently or planning to be nursing or breast feeding? No    Allergies   Allergen Reactions    Dapsone Facial Swelling    Epinephrine     Mupirocin     Seasonal Ic [Cholestatin] Itching    Cephalosporins Rash       May we call your Preferred Phone number to discuss your specific medical information? YES    May we leave a detailed message that includes your specific medical information? YES    Have you traveled outside of the [facility] in the past 3 months? No    Do you currently have a pacemaker or defibrillator? No    Do you have any artificial heart valves, joints, plates, screws, rods, stents, pins, etc? YES, loop monitor   - If Yes, were any placed within the last 2 years?     Do you require any medications prior to a surgical procedure? No      Are you taking any medications that cause you to bleed more easily ("blood thinners") No    Have you ever experienced a rapid heartbeat with epinephrine? No    Have you ever been treated with "gold" (gold sodium thiomalate) therapy? No    [facility] can help with wrinkles, "laugh lines," facial volume loss, "double chin," "love handles," age spots, and more  Are you interested in learning today about some of the skin enhancement procedures that we offer? (If Yes, please provide more detail) No    Review of Systems:  Have you recently had or currently have any of the following?     · Fever or chills: No  · Night Sweats: No  · Headaches: No  · Weight Gain: No  · Weight Loss: No  · Blurry Vision: No  · Nausea: No  · Vomiting: No  · Diarrhea: No  · Blood in Stool: No  · Abdominal Pain: No  · Itchy Skin: No  · Painful Joints: No  · Swollen Joints: No  · Muscle Pain: No  · Irregular Mole: No  · Sun Burn: No  · Dry Skin: No  · Skin Color Changes: No  · Scar or Keloid: No  · Cold Sores/Fever Blisters: No  · Bacterial Infections/MRSA: No  · Anxiety: No  · Depression: No  · Suicidal or Homicidal Thoughts: No      PSYCH: Normal mood and affect  EYES: Normal conjunctiva  ENT: Normal lips and oral mucosa  CARDIOVASCULAR: No edema  RESPIRATORY: Normal respirations  HEME/LYMPH/IMMUNO:  No regional lymphadenopathy except as noted below in ASSESSMENT AND PLAN BY DIAGNOSIS    FULL ORGAN SYSTEM SKIN EXAM (SKIN)   Hair, Scalp, Ears, Face Normal except as noted below in Assessment   Neck, Cervical Chain Nodes Normal except as noted below in Assessment   Right Arm/Hand/Fingers Normal except as noted below in Assessment   Left Arm/Hand/Fingers Normal except as noted below in Assessment   Chest/Breasts/Axillae Viewed areas Normal except as noted below in Assessment   Abdomen, Umbilicus Normal except as noted below in Assessment   Back/Spine Normal except as noted below in Assessment   Groin/Genitalia/Buttocks Viewed areas Normal except as noted below in Assessment   Right Leg, Foot, Toes Normal except as noted below in Assessment   Left Leg, Foot, Toes Normal except as noted below in Assessment       LICHEN PLANUS    Physical Exam:   Anatomic Location Affected:  Left Parietal scalp    Morphological Description:  Mild erythema and no scale  New hair growth noted    Pertinent Positives:   Pertinent Negatives: Additional History of Present Condition:  Previously advised to continue dutasteride 0 5 and minoxidil 2 5 daily  She states she has noticed a lot of improvement including new hair growth     Assessment and Plan:  Based on a thorough discussion of this condition and the management approach to it (including a comprehensive discussion of the known risks, side effects and potential benefits of treatment), the patient (family) agrees to implement the following specific plan:  · Continue dutasteride 0 5 mg by mouth daily as directed   · Continue Minoxidil 2 5 mg once a day by mouth as directed   · At next visit if still noticing improvement will consider removing minoxidil   · Follow up in 3 months        What is lichen planus? Lichen planus is a chronic inflammatory skin condition affecting the skin and mucosal surfaces  There are several clinical types of lichen planus that share similar features on histopathology   Cutaneous lichen planus   Mucosal lichen planus   Lichen planopilaris   Lichen planus of the nails   Lichen planus pigmentosus   Lichenoid drug eruption    Who gets lichen planus? Lichen planus affects about one in one hundred people worldwide, mostly affecting adults over the age of 36 years  About half those affected have oral lichen planus, which is more common in women than in men  About 30% have lichen planus of the nails  What causes lichen planus?   Lichen planus is a T cell-mediated autoimmune disorder, in which inflammatory cells attack an unknown protein within the skin and mucosal keratinocytes  Contributing factors to lichen planus may include:   Genetic predisposition   Physical and emotional stress   Injury to the skin; lichen planus often appears where the skin has been scratched or after surgery -- this is called the isomorphic response (koebnerisation)   Localized skin disease such as herpes zoster--isotopic response   Systemic viral infection, such as hepatitis C (which might modify self-antigens on the surface of basal keratinocytes)   Contact allergy, such as to metal fillings in oral lichen planus (rare)   Drugs; gold, quinine, quinidine and others can cause a lichenoid rash    A lichenoid inflammation is also notable in [id number] disease, a complication of a bone marrow transplant  What are the clinical features of lichen planus? Lichen planus may cause a small number or many lesions on the skin and mucosal surfaces  Cutaneous lichen planus  The usual presentation of the disease is classical lichen planus  Symptoms can range from none (uncommon) to intense itch   Papules and polygonal plaques are shiny, flat-topped and firm on palpation   The plaques are crossed by fine white lines called Adelfo striae   Hypertrophic lichen planus can be scaly   Bullous lichen planus is rare   Size ranges from pinpoint to larger than a centimeter   Distribution may be scattered, clustered, linear, annular or actinic (sun-exposed sites such as face, neck and backs of the hands)   Location can be anywhere, but most often front of the wrists, lower back, and ankles   Colour depends on the patient's skin type  New papules and plaques often have a purple or violet hue, except on palms and soles where they are yellowish brown   Plaques resolve after some months to leave greyish-brown post-inflammatory macules that can take a year or longer to fade  Oral lichen planus  The mouth is often the only affected area   Oral lichen planus often involves the inside of the cheeks and the sides of the tongue, but the gums and lips may also be involved  The most common patterns are:   Painless white streaks in a lacy or fern-like pattern   Painful and persistent erosions and ulcers (erosive lichen planus)   Diffuse redness and peeling of the gums (desquamative gingivitis)   Localized inflammation of the gums adjacent to amalgam fillings    Vulval lichen planus  Lichen planus may affect labia majora, labia minora and vaginal introitus  Presentation includes:   Painless white streaks in a lacy or fern-like pattern   Painful and persistent erosions and ulcers (erosive lichen planus )   Scarring, resulting in adhesions, resorption of labia minora and introital stenosis   Painful desquamative vaginitis, preventing intercourse and causing a mucky vaginal discharge  The eroded vagina may bleed easily on contact   Overlap with vulval lichen sclerosus, an inflammatory skin disorder that most commonly affects women over 48years of age  Penile lichen planus   Penile lichen planus usually presents with classical papules in a ring around the glans  White streaks and erosive lichen planus may occur but are less common  Other mucosal sites   Erosive lichen planus uncommonly affects the lacrimal glands, eyelids, external ear canal, oesophagus, larynx, bladder and anus  Lichen planopilaris   Lichen planopilaris presents as tiny red spiny follicular papules on the scalp or less often, elsewhere on the body  Rarely, blistering occurs in the lesions  Destruction of the hair follicles leads to permanently bald patches characterized by sparse lonely hairs   Frontal fibrosing alopecia is a form of lichen planopilaris that affects the anterior scalp, forehead and eyebrows   Pseudopelade of Brocq is probably a variant of lichen planus without inflammation or scaling   Areas of scarring without hair slowly appear, described as Sanjuanalyn Beers footprints in the snow  Lichen planus pigmentosus   Lichen planus pigmentosus describes ill-defined oval, greyish brown marks on the face and neck or trunk and limbs without an inflammatory phase  It is a form of acquired dermal macular hyperpigmentation  It can be provoked by sun exposure, but it can also arise in sun-protected sites such as the armpits  It has diffuse, reticulate and diffuse patterns  Lichen planus pigmentosus is similar to erythema dyschromicum perstans and may be the same disease   Lichen planus pigmentosus may rarely affect the lips, resulting in a patchy dark pigmentation on upper and lower lips  Lichenoid drug eruption   Lichenoid drug eruption refers to a lichen planus-like rash caused by medications  Asymptomatic or itchy; pink, brown or purple; flat, slightly scaly patches most often arise on the trunk  The oral mucosa (oral lichenoid reaction) and other sites are also sometimes affected  Many drugs can rarely cause lichenoid eruptions  The most common are:   Gold   Hydroxychloroquine   Captopril    What are the complications of lichen planus? Hypertrophic lichen planus may resemble squamous cell carcinoma  However, rarely, longstanding erosive lichen planus can result in true squamous cell carcinoma, most often in the mouth (oral cancer) or on the vulva (vulval cancer) or penis (penile cancer)  This should be suspected if there is an enlarging nodule or an ulcer with thickened edges in these sites  Cancer is more common in smokers, those with a history of cancer in mucosal sites, and in those who carry sexually acquired and oncogenic human papillomavirus  Cancer from other forms of lichen planus is rare  How is lichen planus diagnosed? In most cases, lichen planus is diagnosed by observing its clinical features  A biopsy is often recommended to confirm or make the diagnosis and to look for cancer   The histopathological signs are of a lichenoid tissue reaction affecting the epidermis  Typical features include:   Irregularly thickened epidermis   Degenerative skin cells   Liquefaction degeneration of the basal layer of the epidermis   Band of inflammatory cells just beneath the epidermis   Melanin (pigment) beneath the epidermis    Direct immunofluorescent staining may reveal deposits of immunoglobulins at the base of the epidermis  Patch tests may be recommended for patients with oral lichen planus affecting the gums and who have amalgam fillings, to assess for contact allergy to thiomersal (a mercurial compound)  What is the treatment for lichen planus? Treatment is not always necessary  Local treatments for symptomatic cutaneous or mucosal disease are:   Potent topical steroids   Topical calcineurin inhibitors, tacrolimus ointment and pimecrolimus cream   Topical retinoids   Intralesional steroid injections    Systemic treatment for widespread lichen planus or severe local disease often includes a 1 to 3-month course of oral prednisone, while commencing another agent from the following list:   Acitretin   Hydroxychloroquine   Methotrexate   Azathioprine   Mycophenolate mofetil   Phototherapy    In cases of oral lichen planus affecting the gums with contact allergy to mercury, the lichen planus may resolve on replacing the fillings with composite material  If the lichen planus is not due to mercury allergy, removing amalgam fillings is very unlikely to result in a cure  Anecdotal success is reported from long courses of oral antibiotics and oral antifungal agents  Lichen planopilaris is reported to improve with pioglitazone  What is the outlook for lichen planus? Cutaneous lichen planus tends to clear within a couple of years in most people, but mucosal lichen planus is more likely to persist for a decade or longer  Spontaneous recovery is unpredictable, and lichen planus may recur at a later date   Scarring is permanent, including balding of the scalp     SCAR WITH DISCOLORATION   Physical Exam:   Anatomic Location Affected:  Left knee    Morphological Description:  Pink scars    Pertinent Positives:   Pertinent Negatives: Additional History of Present Condition:  Patient had bad fall     Assessment and Plan:  Based on a thorough discussion of this condition and the management approach to it (including a comprehensive discussion of the known risks, side effects and potential benefits of treatment), the patient (family) agrees to implement the following specific plan:   Laser discussed to portillo   Referral sent for Dr Zuleyka Norman for review    Continue Scar away twice a day    Follow up in 3 months     SEBORRHEIC KERATOSIS; INFLAMED    Physical Exam:   Anatomic Location Affected:  Right calf and lower abdomen    Morphological Description:  Inflamed keratotic papules x 2    Pertinent Positives:   Pertinent Negatives: Additional History of Present Condition:  Patient complains of irritation   Assessment and Plan:  Based on a thorough discussion of this condition and the management approach to it (including a comprehensive discussion of the known risks, side effects and potential benefits of treatment), the patient (family) agrees to implement the following specific plan:   Cryotherapy done in office today    Areas will become red and puffy  Scab up and fall off in 2-3 weeks     Seborrheic Keratosis  A seborrheic keratosis is a harmless warty spot that appears during adult life as a common sign of skin aging  Seborrheic keratoses can arise on any area of skin, covered or uncovered, with the usual exception of the palms and soles  They do not arise from mucous membranes  Seborrheic keratoses can have highly variable appearance  Seborrheic keratoses are extremely common  It has been estimated that over 90% of adults over the age of 61 years have one or more of them   They occur in males and females of all races, typically beginning to erupt in the 35s or 40s  They are uncommon under the age of 21 years  The precise cause of seborrhoeic keratoses is not known  Seborrhoeic keratoses are considered degenerative in nature  As time goes by, seborrheic keratoses tend to become more numerous  Some people inherit a tendency to develop a very large number of them; some people may have hundreds of them  The name "seborrheic keratosis" is misleading, because these lesions are not limited to a seborrhoeic distribution (scalp, mid-face, chest, upper back), nor are they formed from sebaceous glands, nor are they associated with sebum -- which is greasy  Seborrheic keratosis may also be called "SK," "Seb K," "basal cell papilloma," "senile wart," or "barnacle "      Researchers have noted:   Eruptive seborrhoeic keratoses can follow sunburn or dermatitis   Skin friction may be the reason they appear in body folds   Viral cause (e g , human papillomavirus) seems unlikely   Stable and clonal mutations or activation of FRFR3, PIK3CA, ANDREEA, AKT1 and EGFR genes are found in seborrhoeic keratoses   Seborrhoeic keratosis can arise from solar lentigo   FRFR3 mutations also arise in solar lentigines  These mutations are associated with increased age and location on the head and neck, suggesting a role of ultraviolet radiation in these lesions   Seborrheic keratoses do not harbour tumour suppressor gene mutations   Epidermal growth factor receptor inhibitors, which are used to treat some cancers, often result in an increase in verrucal (warty) keratoses  There is no easy way to remove multiple lesions on a single occasion  Unless a specific lesion is "inflamed" and is causing pain or stinging/burning or is bleeding, most insurance companies do not authorize treatment      PROCEDURE:  DESTRUCTION OF BENIGN LESIONS  After a thorough discussion of treatment options and risk/benefits/alternatives (including but not limited to local pain, scarring, dyspigmentation, blistering, and possible superinfection), verbal and written consent were obtained and the aforementioned lesions were treated on with cryotherapy using liquid nitrogen x 1 cycle for 5-10 seconds   TOTAL NUMBER of 3 lesions were treated today on the ANATOMIC LOCATION: lower abdomen and right calf  The patient tolerated the procedure well, and after-care instructions were provided      Scribe Attestation    I,:  Galilea Gómez am acting as a scribe while in the presence of the attending physician :       I,:  Nam Gifford MD personally performed the services described in this documentation    as scribed in my presence :

## 2022-08-26 ENCOUNTER — OFFICE VISIT (OUTPATIENT)
Dept: DERMATOLOGY | Facility: CLINIC | Age: 82
End: 2022-08-26
Payer: COMMERCIAL

## 2022-08-26 VITALS — BODY MASS INDEX: 20.73 KG/M2 | HEIGHT: 63 IN | TEMPERATURE: 97.7 F | WEIGHT: 117 LBS

## 2022-08-26 DIAGNOSIS — L71.9 ROSACEA: Primary | ICD-10-CM

## 2022-08-26 PROCEDURE — 99214 OFFICE O/P EST MOD 30 MIN: CPT | Performed by: DERMATOLOGY

## 2022-08-26 NOTE — PATIENT INSTRUCTIONS
ROSACEA     Assessment and Plan:  Based on a thorough discussion of this condition and the management approach to it (including a comprehensive discussion of the known risks, side effects and potential benefits of treatment), the patient (family) agrees to implement the following specific plan:    - Continue hydrocortisone 2 5%, apply topically to the cheeks and nose avoiding the eyes two times a day for 10 days      -Continue Ivermectin cream as directed        Rosacea is a chronic rash affecting the mid-face including the nose, cheeks, chin, forehead, and eyelids  The incidence is usually greatest between the ages of 30-60 years and is more common in people with fair skin  Common characteristics include redness, telangiectasias, papules and pustules over affected areas  Rosacea may look similar to acne, but there is a lack of comedones  Occasionally the eyes may also be involved in ocular rosacea  In advanced disease, enlargement of the sebaceous glands in the nose, termed rhinophyma, may be present  Rosacea results in red spots (papules) and sometimes pustules over the face, but unlike acne there are no blackheads, whiteheads, or cystic nodules  Patients often experience increased facial flushing with prominent blood vessels (erythematotelangiectatic rosacea) and dry, sensitive skin  These symptoms are exacerbated by sun exposure, hot or spicy foods, topical steroids and oil-based facial products  In ocular rosacea, eyelids may be red and sore due to conjunctivitis, keratitis, and episcleritis  If rhinophyma develops due to enlargement of sebaceous glands, the patient may have an enlarged and irregularly shaped nose with prominent pores  In rosacea that is refractory to treatment, patients can develop persistent redness and swelling of the face due to lymphatic obstruction (Morbihan disease)  Distribution around the cheeks may be confused with the malar or butterfly rash of lupus   However, the rash of lupus spares the nasal creases and lacks papules and pustules  If signs of photosensitivity, oral ulcers, arthritis, and kidney dysfunction are present then consider referral to a rheumatologist      There are many potential causes of rosacea including genetic, environmental, vascular, and inflammatory factors  These include, but are not limited to:  Chronic exposure to ultraviolet radiation   Increased immune responses in the form of cathelicidins that promote vessel dilation and infiltration with white blood cells (neutrophils) into the dermis  Increased matrix metalloproteinases such as collagen and elastase that remodel normal tissue may contribute to inflammation of the skin making it thicker and harder  There is some evidence to suggest that increased numbers of demodex mites on patient skin may contribute to rosacea papules     General Treatment Approach   Avoid exacerbating factors such as heat, spicy foods, and alcohol   Use daily SPF30+ sunscreen and other methods of coverage for sun protection  Use water-based make-up   Avoid applying topical steroids to affected areas as they can cause perioral dermatitis and exacerbate rosacea     Topical Treatment Approach  Metronidazole cream or gel by itself or in combination with oral antibiotics for more severe cases  Azelaic acid cream or lotion is effective for mild inflammatory rosacea when applied twice daily to affected areas  Brimonidine gel and oxymetazoline hydrochloride cream can reduce facial redness temporarily   Ivermectin cream can treat papulopustular rosacea by controlling demodex mites and inflammation   Pimecrolimus cream or tacrolimus ointment twice a day for 2-3 months can help reduce inflammation    Oral Treatment Approach  Antibiotics such as doxycycline, minocycline, or erythromycin for 1-3 months  Clonidine and carvedilol can help reduce facial flushing and are generally well tolerated   Common side effects include low blood pressure, gastrointestinal upset, dry eyes, blurred vision and low heart rate  Isotretinoin at low doses can be effective for long term treatment when antibiotics fail  Side effects may make it unsuitable for some patients  NSAIDs such as diclofenac can help reduce discomfort and redness in the skin       Procedural/Surgical Treatment Approach   Vascular lasers or intense pulsed light treatment may be used to treat persistent telangiectasia and papulopustular rosacea  Plastic surgery and carbon dioxide lasers may be used to treat rhinophyma

## 2022-08-26 NOTE — PROGRESS NOTES
[facility] Follow Up Note    Patient Name: Ernst Vizcarra  Encounter Date:8/26/2022    LUMA'LESA Chief Concerns:  [employer] Concern #1:  Facial rash       Current Medications:    Current Outpatient Medications:     b complex vitamins capsule, Take 1 capsule by mouth daily, Disp: , Rfl:     calcipotriene (DOVONEX) 0 005 % topical solution, Apply 1 application topically 2 (two) times a day, Disp: 60 mL, Rfl: 2    cyanocobalamin (VITAMIN B-12) 100 mcg tablet, Take by mouth daily, Disp: , Rfl:     cycloSPORINE (RESTASIS) 0 05 % ophthalmic emulsion, 1 drop by Tube route 2 (two) times a day, Disp: , Rfl:     estradiol (ESTRACE) 0 1 mg/g vaginal cream, Insert into the vagina 2 (two) times a week, Disp: , Rfl:     folic acid (FOLVITE) 1 mg tablet, TAKE ONE TABLET BY MOUTH EVERY DAY WHILE ON METHOTREXATE TO PREVENT SIDE EFFECTS (GENERIC FOR FOLVITE), Disp: 90 tablet, Rfl: 2    Ivermectin (Soolantra) 1 % CREA, Apply topically 1-2 times a day to face area as needed for rosacea , Disp: 45 g, Rfl: 5    Lutein 40 MG CAPS, Take by mouth daily  , Disp: , Rfl:     Magnesium 500 MG CAPS, Take by mouth, Disp: , Rfl:     Magnesium Gluconate 550 MG TABS, Take 30 mg by mouth 2 (two) times a day  , Disp: , Rfl:     metoprolol succinate (TOPROL-XL) 25 mg 24 hr tablet, Take 25 mg by mouth in the morning , Disp: , Rfl:     minoxidil (LONITEN) 2 5 mg tablet, Take 1/2 a tablet by mouth once daily, Disp: 90 tablet, Rfl: 2    Cholecalciferol-Vitamin C 1000-500 UNIT-MG CAPS, Take by mouth, Disp: , Rfl:     cholestyramine (QUESTRAN) 4 g packet, cholestyramine 4 gm pack (Patient not taking: No sig reported), Disp: , Rfl:     CHOLESTYRAMINE PO, Take by mouth, Disp: , Rfl:     clobetasol (TEMOVATE) 0 05 % cream, Apply topically 2 (two) times a day, Disp: , Rfl:     clobetasol (TEMOVATE) 0 05 % external solution, APPLY TOPICALLY TWO TIMES A DAY (GENERIC FOR TEMOVATE), Disp: 50 mL, Rfl: 3    dextromethorphan 15 MG/5ML syrup, Take 15 mg one time with methotrexate weekly (Patient not taking: No sig reported), Disp: 120 mL, Rfl: 0    dutasteride (AVODART) 0 5 mg capsule, TAKE ONE CAPSULE BY MOUTH EVERY DAY, Disp: 90 capsule, Rfl: 2    hydrocortisone 2 5 % cream, apply topically twice a day to affected areas on face for 2 weeks (Patient not taking: Reported on 7/22/2021), Disp: 30 g, Rfl: 1    meloxicam (MOBIC) 15 mg tablet, , Disp: , Rfl:     methotrexate 2 5 mg tablet, Start taking 4 pills once weekly and increase by 1 pill weekly until 6 pills once weekly is reached, continue at 6 pills once weekly until follow up appointment in 3 months, Disp: 72 tablet, Rfl: 1    metoprolol succinate (TOPROL-XL) 50 mg 24 hr tablet, Take 50 mg by mouth daily (Patient not taking: Reported on 7/22/2021), Disp: , Rfl:     Multiple Vitamin-Folic Acid TABS, Take by mouth, Disp: , Rfl:     CONSTITUTIONAL:   Vitals:    08/26/22 1502   Temp: 97 7 °F (36 5 °C)   TempSrc: Temporal   Weight: 53 1 kg (117 lb)   Height: 5' 3" (1 6 m)         Specific Alerts:    Have you been seen by a [facility] in the last 3 years? YES    Are you pregnant or planning to become pregnant? No    Are you currently or planning to be nursing or breast feeding? No    Allergies   Allergen Reactions    Dapsone Facial Swelling    Epinephrine     Mupirocin     Seasonal Ic [Cholestatin] Itching    Cephalosporins Rash       May we call your Preferred Phone number to discuss your specific medical information? YES    May we leave a detailed message that includes your specific medical information? YES    Have you traveled outside of the [facility] in the past 3 months? No    Do you currently have a pacemaker or defibrillator? No    Do you have any artificial heart valves, joints, plates, screws, rods, stents, pins, etc? No   - If Yes, were any placed within the last 2 years? Do you require any medications prior to a surgical procedure?  No   - If Yes, for which procedure? - If Yes, what medications to you require? Are you taking any medications that cause you to bleed more easily ("blood thinners") No    Have you ever experienced a rapid heartbeat with epinephrine? No    Have you ever been treated with "gold" (gold sodium thiomalate) therapy? No    [facility] can help with wrinkles, "laugh lines," facial volume loss, "double chin," "love handles," age spots, and more  Are you interested in learning today about some of the skin enhancement procedures that we offer? (If Yes, please provide more detail) No    Review of Systems:  Have you recently had or currently have any of the following? · Fever or chills: No  · Night Sweats: No  · Headaches: No  · Weight Gain: No  · Weight Loss: No  · Blurry Vision: No  · Nausea: No  · Vomiting: No  · Diarrhea: No  · Blood in Stool: No  · Abdominal Pain: No  · Itchy Skin: No  · Painful Joints: No  · Swollen Joints: No  · Muscle Pain: No  · Irregular Mole: No  · Sun Burn: No  · Dry Skin: No  · Skin Color Changes: No  · Scar or Keloid: No  · Cold Sores/Fever Blisters: No  · Bacterial Infections/MRSA: No  · Anxiety: No  · Depression: No  · Suicidal or Homicidal Thoughts: No      PSYCH: Normal mood and affect  EYES: Normal conjunctiva  ENT: Normal lips and oral mucosa  CARDIOVASCULAR: No edema  RESPIRATORY: Normal respirations  HEME/LYMPH/IMMUNO:  No regional lymphadenopathy except as noted below in ASSESSMENT AND PLAN BY DIAGNOSIS    FULL ORGAN SYSTEM SKIN EXAM (SKIN)  Face Normal except as noted below in Assessment     Patient here for redness on nose and cheeks, patient noted swelling hot to the touch and swelling  Patient took Prednisone slight improvement , rash stated yesterday  ROSACEA FLARE    Physical Exam:   Anatomic Location Affected:  Central face (cheeks, nose)   Morphological Description:  Slight erythema left cheek > right cheek    Slight periorbital edema    Pertinent Positives:   Pertinent Negatives: no edema of lips, tongue, throat; no difficulty breathing      Additional History of Present Condition:  Patient here for redness on nose and cheeks that started yesterday  Patient noted swelling of the cheeks and felt as though they were hot to the touch and puffy  Patient took previously prescribed Prednisone 30mg this morning with slight improvement  Assessment and Plan:  Based on a thorough discussion of this condition and the management approach to it (including a comprehensive discussion of the known risks, side effects and potential benefits of treatment), the patient (family) agrees to implement the following specific plan:    - Continue hydrocortisone 2 5%, apply topically to the cheeks and nose avoiding the eyes two times a day for 10 days  - Continue Ivermectin cream as previously prescribed     Rosacea is a chronic rash affecting the mid-face including the nose, cheeks, chin, forehead, and eyelids  The incidence is usually greatest between the ages of 30-60 years and is more common in people with fair skin  Common characteristics include redness, telangiectasias, papules and pustules over affected areas  Rosacea may look similar to acne, but there is a lack of comedones  Occasionally the eyes may also be involved in ocular rosacea  In advanced disease, enlargement of the sebaceous glands in the nose, termed rhinophyma, may be present  Rosacea results in red spots (papules) and sometimes pustules over the face, but unlike acne there are no blackheads, whiteheads, or cystic nodules  Patients often experience increased facial flushing with prominent blood vessels (erythematotelangiectatic rosacea) and dry, sensitive skin  These symptoms are exacerbated by sun exposure, hot or spicy foods, topical steroids and oil-based facial products  In ocular rosacea, eyelids may be red and sore due to conjunctivitis, keratitis, and episcleritis   If rhinophyma develops due to enlargement of sebaceous glands, the patient may have an enlarged and irregularly shaped nose with prominent pores  In rosacea that is refractory to treatment, patients can develop persistent redness and swelling of the face due to lymphatic obstruction (Morbihan disease)  Distribution around the cheeks may be confused with the malar or butterfly rash of lupus  However, the rash of lupus spares the nasal creases and lacks papules and pustules  If signs of photosensitivity, oral ulcers, arthritis, and kidney dysfunction are present then consider referral to a rheumatologist      There are many potential causes of rosacea including genetic, environmental, vascular, and inflammatory factors   These include, but are not limited to:   Chronic exposure to ultraviolet radiation    Increased immune responses in the form of cathelicidins that promote vessel dilation and infiltration with white blood cells (neutrophils) into the dermis   Increased matrix metalloproteinases such as collagen and elastase that remodel normal tissue may contribute to inflammation of the skin making it thicker and harder   There is some evidence to suggest that increased numbers of demodex mites on patient skin may contribute to rosacea papules     General Treatment Approach    Avoid exacerbating factors such as heat, spicy foods, and alcohol    Use daily SPF30+ sunscreen and other methods of coverage for sun protection   Use water-based make-up    Avoid applying topical steroids to affected areas as they can cause perioral dermatitis and exacerbate rosacea     Topical Treatment Approach   Metronidazole cream or gel by itself or in combination with oral antibiotics for more severe cases   Azelaic acid cream or lotion is effective for mild inflammatory rosacea when applied twice daily to affected areas   Brimonidine gel and oxymetazoline hydrochloride cream can reduce facial redness temporarily    Ivermectin cream can treat papulopustular rosacea by controlling demodex mites and inflammation    Pimecrolimus cream or tacrolimus ointment twice a day for 2-3 months can help reduce inflammation    Oral Treatment Approach   Antibiotics such as doxycycline, minocycline, or erythromycin for 1-3 months   Clonidine and carvedilol can help reduce facial flushing and are generally well tolerated  Common side effects include low blood pressure, gastrointestinal upset, dry eyes, blurred vision and low heart rate   Isotretinoin at low doses can be effective for long term treatment when antibiotics fail  Side effects may make it unsuitable for some patients   NSAIDs such as diclofenac can help reduce discomfort and redness in the skin       Procedural/Surgical Treatment Approach    Vascular lasers or intense pulsed light treatment may be used to treat persistent telangiectasia and papulopustular rosacea   Plastic surgery and carbon dioxide lasers may be used to treat rhinophyma     Scribe Attestation    I,:  Maksim Tejeda am acting as a scribe while in the presence of the attending physician :       I,:  Edwige Jim MD personally performed the services described in this documentation    as scribed in my presence :         Víctor Gilman MD  Dermatology, PGY-2

## 2022-08-29 ENCOUNTER — TELEPHONE (OUTPATIENT)
Dept: DERMATOLOGY | Age: 82
End: 2022-08-29

## 2022-08-29 NOTE — TELEPHONE ENCOUNTER
Call patient to see how she was doing after Friday's ovs, no answer left message advising pt to reach out to us via [facility] or phone call if she's no better

## 2022-08-30 DIAGNOSIS — L71.9 ROSACEA: Primary | ICD-10-CM

## 2022-08-30 RX ORDER — DOXYCYCLINE HYCLATE 20 MG
20 TABLET ORAL 2 TIMES DAILY
Qty: 60 TABLET | Refills: 1 | Status: SHIPPED | OUTPATIENT
Start: 2022-08-30 | End: 2022-09-16 | Stop reason: ALTCHOICE

## 2022-09-16 ENCOUNTER — TELEPHONE (OUTPATIENT)
Dept: UROLOGY | Facility: AMBULATORY SURGERY CENTER | Age: 82
End: 2022-09-16

## 2022-09-16 ENCOUNTER — OFFICE VISIT (OUTPATIENT)
Dept: UROLOGY | Facility: AMBULATORY SURGERY CENTER | Age: 82
End: 2022-09-16
Payer: COMMERCIAL

## 2022-09-16 VITALS
DIASTOLIC BLOOD PRESSURE: 80 MMHG | OXYGEN SATURATION: 98 % | SYSTOLIC BLOOD PRESSURE: 138 MMHG | BODY MASS INDEX: 21.09 KG/M2 | HEIGHT: 63 IN | HEART RATE: 73 BPM | WEIGHT: 119 LBS

## 2022-09-16 DIAGNOSIS — Z13.1 SCREENING FOR DIABETES MELLITUS: ICD-10-CM

## 2022-09-16 DIAGNOSIS — R32 INCONTINENCE IN FEMALE: Primary | ICD-10-CM

## 2022-09-16 LAB
BACTERIA UR QL AUTO: NORMAL /HPF
BILIRUB UR QL STRIP: NEGATIVE
CLARITY UR: CLEAR
COLOR UR: YELLOW
GLUCOSE UR STRIP-MCNC: NEGATIVE MG/DL
HGB UR QL STRIP.AUTO: NEGATIVE
KETONES UR STRIP-MCNC: NEGATIVE MG/DL
LEUKOCYTE ESTERASE UR QL STRIP: NEGATIVE
NITRITE UR QL STRIP: NEGATIVE
NON-SQ EPI CELLS URNS QL MICRO: NORMAL /HPF
PH UR STRIP.AUTO: 7 [PH]
PROT UR STRIP-MCNC: NEGATIVE MG/DL
RBC #/AREA URNS AUTO: NORMAL /HPF
SL AMB  POCT GLUCOSE, UA: NORMAL
SL AMB LEUKOCYTE ESTERASE,UA: NORMAL
SL AMB POCT BILIRUBIN,UA: NORMAL
SL AMB POCT BLOOD,UA: NORMAL
SL AMB POCT CLARITY,UA: CLEAR
SL AMB POCT COLOR,UA: YELLOW
SL AMB POCT KETONES,UA: NORMAL
SL AMB POCT NITRITE,UA: NORMAL
SL AMB POCT PH,UA: 7.5
SL AMB POCT SPECIFIC GRAVITY,UA: 1.01
SL AMB POCT URINE PROTEIN: NORMAL
SL AMB POCT UROBILINOGEN: 0.2
SP GR UR STRIP.AUTO: 1.02 (ref 1–1.03)
UROBILINOGEN UR STRIP-ACNC: <2 MG/DL
WBC #/AREA URNS AUTO: NORMAL /HPF

## 2022-09-16 PROCEDURE — 81001 URINALYSIS AUTO W/SCOPE: CPT | Performed by: NURSE PRACTITIONER

## 2022-09-16 PROCEDURE — 81002 URINALYSIS NONAUTO W/O SCOPE: CPT | Performed by: NURSE PRACTITIONER

## 2022-09-16 PROCEDURE — 99203 OFFICE O/P NEW LOW 30 MIN: CPT | Performed by: NURSE PRACTITIONER

## 2022-09-16 PROCEDURE — 87086 URINE CULTURE/COLONY COUNT: CPT | Performed by: NURSE PRACTITIONER

## 2022-09-16 RX ORDER — OXYBUTYNIN CHLORIDE 10 MG/1
10 TABLET, EXTENDED RELEASE ORAL
Qty: 30 TABLET | Refills: 3 | Status: SHIPPED | OUTPATIENT
Start: 2022-09-16

## 2022-09-16 RX ORDER — TIZANIDINE 2 MG/1
TABLET ORAL
COMMUNITY
Start: 2022-08-03 | End: 2023-02-27

## 2022-09-16 NOTE — PROGRESS NOTES
09/16/22    Daquan Marc   1940   [id number]     Assessment  1 Stress incontinence   2 Nocturnal enuresis     Discussion/Plan  1 Stress incontinence   2 Nocturnal enuresis    Trial 10 mg oxybutynin daily - side effects reviewed   Constipation management   Hemoglobin A1c ordered    Pelvic floor physical therapy and/or PTNS discussed and recommended   UTI prevention discussed     Trial medication  Schedule PTNS with nursing  Subjective  HPI   Daquan Marc is an [age] [address] old female who presents in consultation for evaluation of urinary incontinence  She is a former patient of Dr Jordan Bermeo in [facility]  She had bladder sling in 2012 and bladder Botox in 2013  Unfortunately, she required alberts catheter due to urinary retention after Botox procedure  She completed session of pelvic floor PT  She continues with stress incontinence and leakage upon standing  She recently had an episode of nocturnal enuresis  She experienced recurrent bladder infections last summer  She often has to lean forward and press on her stomach to empty completely  She uses D-mannose and cranberry for UTI prevention  She has issues with constipation at times  She feels well at this time  Review of Systems - History obtained from chart review and the patient  General ROS: negative  Psychological ROS: negative  Respiratory ROS: negative  Cardiovascular ROS: negative  Gastrointestinal ROS: negative  Genito-Urinary ROS: positive for - incontinence, nocturia and urinary frequency/urgency  Musculoskeletal ROS: negative  Neurological ROS: no TIA or stroke symptoms  Dermatological ROS: negative       Objective   Physical Exam  Vitals and nursing note reviewed  Constitutional:       General: She is not in acute distress  Appearance: Normal appearance  She is not ill-appearing, toxic-appearing or diaphoretic  HENT:      Head: Normocephalic and atraumatic  Pulmonary:      Effort: Pulmonary effort is normal  No respiratory distress  Abdominal:      Tenderness: There is no right CVA tenderness or left CVA tenderness  Musculoskeletal:         General: Normal range of motion  Cervical back: Normal range of motion  Skin:     General: Skin is warm and dry  Neurological:      General: No focal deficit present  Mental Status: She is alert and oriented to person, place, and time  Mental status is at baseline  Psychiatric:         Mood and Affect: Mood normal          Behavior: Behavior normal          Thought Content: Thought content normal          Judgment: Judgment normal          JAY Tabares     I have spent 15 minutes with Patient  today in which greater than 50% of this time was spent in counseling/coordination of care regarding Intructions for management and Patient and family education

## 2022-09-16 NOTE — TELEPHONE ENCOUNTER
----- Message from Lenora De Jesus RN sent at 9/16/2022 10:44 AM EDT -----  Unfortunately we dont have the rooms right now to schedule the continuous PTNS for patient for other offices  At least the first several would need to be at the office shes normally seen at and when our rooms open up she could start coming here   ----- Message -----  From: Ten Parker MA  Sent: 9/16/2022  10:42 AM EDT  To: Petar Arguelles    Pt would like to schedule the PTNS at [facility]  Please schedule accordingly

## 2022-09-18 LAB — BACTERIA UR CULT: NORMAL

## 2022-09-26 ENCOUNTER — OFFICE VISIT (OUTPATIENT)
Dept: DERMATOLOGY | Facility: CLINIC | Age: 82
End: 2022-09-26
Payer: COMMERCIAL

## 2022-09-26 VITALS — BODY MASS INDEX: 20.73 KG/M2 | TEMPERATURE: 97.9 F | WEIGHT: 117 LBS | HEIGHT: 63 IN

## 2022-09-26 DIAGNOSIS — L57.8 PHOTOAGING OF SKIN: Primary | ICD-10-CM

## 2022-09-26 DIAGNOSIS — L81.6 POIKILODERMA: ICD-10-CM

## 2022-09-26 PROCEDURE — 99213 OFFICE O/P EST LOW 20 MIN: CPT | Performed by: DERMATOLOGY

## 2022-09-26 NOTE — PROGRESS NOTES
[facility] Follow Up Note    Patient Name: Cesar Sandy  Encounter Date: 09/26/2022    Today's Chief Concerns:  [place] Concern #1:  Consult for laser treatment     Current Medications:    Current Outpatient Medications:     b complex vitamins capsule, Take 1 capsule by mouth daily, Disp: , Rfl:     cyanocobalamin (VITAMIN B-12) 100 mcg tablet, Take by mouth daily, Disp: , Rfl:     cycloSPORINE (RESTASIS) 0 05 % ophthalmic emulsion, 1 drop by Tube route 2 (two) times a day, Disp: , Rfl:     dutasteride (AVODART) 0 5 mg capsule, TAKE ONE CAPSULE BY MOUTH EVERY DAY, Disp: 90 capsule, Rfl: 2    estradiol (ESTRACE) 0 1 mg/g vaginal cream, Insert into the vagina 2 (two) times a week, Disp: , Rfl:     folic acid (FOLVITE) 1 mg tablet, TAKE ONE TABLET BY MOUTH EVERY DAY WHILE ON METHOTREXATE TO PREVENT SIDE EFFECTS (GENERIC FOR FOLVITE), Disp: 90 tablet, Rfl: 2    Ivermectin (Soolantra) 1 % CREA, Apply topically 1-2 times a day to face area as needed for rosacea , Disp: 45 g, Rfl: 5    Lutein 40 MG CAPS, Take by mouth daily  , Disp: , Rfl:     Magnesium Gluconate 550 MG TABS, Take 30 mg by mouth 2 (two) times a day  , Disp: , Rfl:     metoprolol succinate (TOPROL-XL) 25 mg 24 hr tablet, Take 25 mg by mouth in the morning , Disp: , Rfl:     minoxidil (LONITEN) 2 5 mg tablet, Take 1/2 a tablet by mouth once daily, Disp: 90 tablet, Rfl: 2    Magnesium 500 MG CAPS, Take by mouth (Patient not taking: No sig reported), Disp: , Rfl:     meloxicam (MOBIC) 15 mg tablet, , Disp: , Rfl:     oxybutynin (DITROPAN-XL) 10 MG 24 hr tablet, Take 1 tablet (10 mg total) by mouth daily at bedtime (Patient not taking: Reported on 9/26/2022), Disp: 30 tablet, Rfl: 3    tiZANidine (ZANAFLEX) 2 mg tablet, , Disp: , Rfl:     CONSTITUTIONAL:   Vitals:    09/26/22 1023   Temp: 97 9 °F (36 6 °C)   Weight: 53 1 kg (117 lb)   Height: 5' 3" (1 6 m)             Specific Alerts:    Have you been seen by a [facility]atologist in the last 3 years? YES    Are you pregnant or planning to become pregnant? No    Are you currently or planning to be nursing or breast feeding? No    Allergies   Allergen Reactions    Dapsone Facial Swelling    Epinephrine     Mupirocin     Seasonal Ic [Cholestatin] Itching    Cephalosporins Rash       May we call your Preferred Phone number to discuss your specific medical information? YES    May we leave a detailed message that includes your specific medical information? YES    Have you traveled outside of the [facility] in the past 3 months? No    Do you currently have a pacemaker or defibrillator? No    Do you have any artificial heart valves, joints, plates, screws, rods, stents, pins, etc? YES Loop monitor    - If Yes, were any placed within the last 2 years? Do you require any medications prior to a surgical procedure? No   - If Yes, for which procedure? N/a    - If Yes, what medications to you require? N/a     Are you taking any medications that cause you to bleed more easily ("blood thinners") No    Have you ever experienced a rapid heartbeat with epinephrine? YES    Have you ever been treated with "gold" (gold sodium thiomalate) therapy? No    [facility] can help with wrinkles, "laugh lines," facial volume loss, "double chin," "love handles," age spots, and more  Are you interested in learning today about some of the skin enhancement procedures that we offer? (If Yes, please provide more detail) No    Review of Systems:  Have you recently had or currently have any of the following?     · Fever or chills: No  · Night Sweats: No  · Headaches: No  · Weight Gain: No  · Weight Loss: No  · Blurry Vision: No  · Nausea: No  · Vomiting: No  · Diarrhea: No  · Blood in Stool: No  · Abdominal Pain: No  · Itchy Skin: No  · Painful Joints: No  · Swollen Joints: No  · Muscle Pain: No  · Irregular Mole: No  · Sun Burn: No  · Dry Skin: No  · Skin Color Changes: No  · Scar or Keloid: No  · Cold Sores/Fever Blisters: No  · Bacterial Infections/MRSA: No  · Anxiety: No  · Depression: No  · Suicidal or Homicidal Thoughts: No      PSYCH: Normal mood and affect  EYES: Normal conjunctiva  ENT: Normal lips and oral mucosa  CARDIOVASCULAR: No edema  RESPIRATORY: Normal respirations  HEME/LYMPH/IMMUNO:  No regional lymphadenopathy except as noted below in ASSESSMENT AND PLAN BY DIAGNOSIS    FULL ORGAN SYSTEM SKIN EXAM (SKIN)  Hair, Scalp, Ears, Face    Neck, Cervical Chain Nodes    Right Arm/Hand/Fingers    Left Arm/Hand/Fingers    Chest/Breasts/Axillae    Abdomen, Umbilicus    Back/Spine    Groin/Genitalia/Buttocks    Right Leg, Foot, Toes Normal except as noted below in Assessment   Left Leg, Foot, Toes Normal except as noted below in Assessment       Poikiloderma  photo aging  Physical Exam:   Anatomic Location Affected:  Left knee    Morphological Description:  Scattered photo damage and some focal purpura consistent with previous trauma   Pertinent Positives:   Pertinent Negatives: No regional LAD    Additional History of Present Condition:  Patient states she fell on gravel in April  Has been applying scar away twuce a day  Would like to try laser  Assessment and Plan:  Based on a thorough discussion of this condition and the management approach to it (including a comprehensive discussion of the known risks, side effects and potential benefits of treatment), the patient (family) agrees to implement the following specific plan:   Will call insurance and get authorization for laser for purpura    Laser for photo aging and lentigo will be $650 for 3 treatments    Patient scheduled for December          Scribe Attestation    I,:  Alissa Gomez MA am acting as a scribe while in the presence of the attending physician :       I,:  Jose Ordonez MD personally performed the services described in this documentation    as scribed in my presence :

## 2022-09-26 NOTE — PATIENT INSTRUCTIONS
Assessment and Plan:  Based on a thorough discussion of this condition and the management approach to it (including a comprehensive discussion of the known risks, side effects and potential benefits of treatment), the patient (family) agrees to implement the following specific plan: Will call insurance and get authorization for laser for purpura   Laser for photo aging and lentigo will be $650 for 3 treatments   Patient scheduled for December

## 2022-09-27 NOTE — TELEPHONE ENCOUNTER
Patient called to find out were we are with the scheduling of her PTNS treatments  She said the last time she heard from someone was over a week ago and would like to get that schedule started asap      She may be reached at [phone]

## 2022-09-30 NOTE — TELEPHONE ENCOUNTER
Patient calling that she is at [facility] and they did not receive her order for her lab work      Refaxed as requested

## 2022-10-01 LAB
EST. AVERAGE GLUCOSE BLD GHB EST-MCNC: 117 MG/DL
HBA1C MFR BLD: 5.7 % (ref 4.8–5.6)

## 2022-10-02 DIAGNOSIS — L66.1 LICHEN PLANOPILARIS: ICD-10-CM

## 2022-10-03 RX ORDER — DUTASTERIDE 0.5 MG/1
CAPSULE, LIQUID FILLED ORAL
Qty: 90 CAPSULE | Refills: 2 | Status: SHIPPED | OUTPATIENT
Start: 2022-10-03

## 2022-10-03 NOTE — TELEPHONE ENCOUNTER
Patient is calling to check on the status of the pre auth for her to start her treatments  She hasn't heard anything and wants to know what is going on       Pt and be reached at [phone]

## 2022-10-05 NOTE — TELEPHONE ENCOUNTER
Pt under care of Ryan Britt- [id number] [place]    Reason for call: Patient has been calling about starting her PTNS treatments and getting authorization since 9/16 and no one has gotten back to her      Pt can be reached at: [phone]

## 2022-10-07 ENCOUNTER — PATIENT MESSAGE (OUTPATIENT)
Dept: UROLOGY | Facility: AMBULATORY SURGERY CENTER | Age: 82
End: 2022-10-07

## 2022-10-07 NOTE — TELEPHONE ENCOUNTER
Pt under care of Leroy Goetz- [id number]    Reason for call: Patient has been calling about starting her PTNS treatments and getting authorization since 9/16 and no one has gotten back to her  She's pretty frustrated at this point and would like to speak with a provider or       Pt can be reached at: [phone]

## 2022-10-10 NOTE — TELEPHONE ENCOUNTER
As per Monica Deleon at [facility] side Services, no pre cert is required for PTNS  Call [phone]  Office stock ok to use  Please continue with scheduling patient   *KB

## 2022-10-10 NOTE — TELEPHONE ENCOUNTER
Sent correspondence to Glynn Pina regarding authorization  Glynn Pina states she is making this authorization a priority, however is at the mercy of the insurance company  Apologized for delay in this  Advised I would have myself or one of the [facility] contact patient back as soon as we get approval regarding PTNS  Patient thanked me for returning call and looks forward to phone call back

## 2022-10-10 NOTE — TELEPHONE ENCOUNTER
Called and spoke with patient  Scheduled in [place]  Patient stated she wished to have the treatments in [facility]  Rescheduled appointments and advised that they are to be at the same time and day each week  Confirmed first appointment with patient  She is aware subsequent appointments will be same day and time

## 2022-10-18 ENCOUNTER — OFFICE VISIT (OUTPATIENT)
Dept: DERMATOLOGY | Age: 82
End: 2022-10-18
Payer: COMMERCIAL

## 2022-10-18 VITALS — WEIGHT: 108.8 LBS | BODY MASS INDEX: 19.28 KG/M2 | HEIGHT: 63 IN | TEMPERATURE: 98.1 F

## 2022-10-18 DIAGNOSIS — L66.1 LICHEN PLANOPILARIS: ICD-10-CM

## 2022-10-18 PROCEDURE — 11900 INJECT SKIN LESIONS </W 7: CPT | Performed by: DERMATOLOGY

## 2022-10-18 NOTE — PROGRESS NOTES
[facility] Follow Up Note    Patient Name: Sarahi Montalvo  Encounter Date: 02 23 [id number]    Today's Chief Concerns:  • Concern #1:  Lichen planus follow up     Current Medications:    Current Outpatient Medications:   •  b complex vitamins capsule, Take 1 capsule by mouth daily, Disp: , Rfl:   •  cyanocobalamin (VITAMIN B-12) 100 mcg tablet, Take by mouth daily, Disp: , Rfl:   •  cycloSPORINE (RESTASIS) 0 05 % ophthalmic emulsion, 1 drop by Tube route 2 (two) times a day, Disp: , Rfl:   •  dutasteride (AVODART) 0 5 mg capsule, TAKE ONE CAPSULE BY MOUTH EVERY DAY, Disp: 90 capsule, Rfl: 2  •  estradiol (ESTRACE) 0 1 mg/g vaginal cream, Insert into the vagina 2 (two) times a week, Disp: , Rfl:   •  folic acid (FOLVITE) 1 mg tablet, TAKE ONE TABLET BY MOUTH EVERY DAY WHILE ON METHOTREXATE TO PREVENT SIDE EFFECTS ([facility]), Disp: 90 tablet, Rfl: 2  •  Ivermectin (Soolantra) 1 % CREA, Apply topically 1-2 times a day to face area as needed for rosacea , Disp: 45 g, Rfl: 5  •  Lutein 40 MG CAPS, Take by mouth daily  , Disp: , Rfl:   •  Magnesium 500 MG CAPS, Take by mouth, Disp: , Rfl:   •  Magnesium Gluconate 550 MG TABS, Take 30 mg by mouth 2 (two) times a day  , Disp: , Rfl:   •  meloxicam (MOBIC) 15 mg tablet, , Disp: , Rfl:   •  metoprolol succinate (TOPROL-XL) 25 mg 24 hr tablet, Take 25 mg by mouth in the morning , Disp: , Rfl:   •  minoxidil (LONITEN) 2 5 mg tablet, Take 1/2 a tablet by mouth once daily, Disp: 90 tablet, Rfl: 2  •  oxybutynin (DITROPAN-XL) 10 MG 24 hr tablet, Take 1 tablet (10 mg total) by mouth daily at bedtime, Disp: 30 tablet, Rfl: 3  •  tiZANidine (ZANAFLEX) 2 mg tablet, , Disp: , Rfl:     CONSTITUTIONAL:   There were no vitals filed for this visit  Specific Alerts:    Have you been seen by a [facility] in the last 3 years? YES    Are you pregnant or planning to become pregnant? N/A    Are you currently or planning to be nursing or breast feeding? N/A    Allergies   Allergen Reactions   • Dapsone Facial Swelling   • Epinephrine    • Mupirocin    • Seasonal Ic [Cholestatin] Itching   • Cephalosporins Rash       May we call your Preferred Phone number to discuss your specific medical information? YES    May we leave a detailed message that includes your specific medical information? YES    Have you traveled outside of the [facility] in the past 3 months? No    Do you currently have a pacemaker or defibrillator? No    Do you have any artificial heart valves, joints, plates, screws, rods, stents, pins, etc? No   - If Yes, were any placed within the last 2 years? Do you require any medications prior to a surgical procedure? No   - If Yes, for which procedure? - If Yes, what medications to you require? Are you taking any medications that cause you to bleed more easily ("blood thinners") No    Have you ever experienced a rapid heartbeat with epinephrine? No    Have you ever been treated with "gold" (gold sodium thiomalate) therapy? No    [facility] can help with wrinkles, "laugh lines," facial volume loss, "double chin," "love handles," age spots, and more  Are you interested in learning today about some of the skin enhancement procedures that we offer? (If Yes, please provide more detail) No    Review of Systems:  Have you recently had or currently have any of the following?     · Fever or chills: No  · Night Sweats: No  · Headaches: No  · Weight Gain: No  · Weight Loss: No  · Blurry Vision: No  · Nausea: No  · Vomiting: No  · Diarrhea: No  · Blood in Stool: No  · Abdominal Pain: No  · Itchy Skin: No  · Painful Joints: No  · Swollen Joints: No  · Muscle Pain: No  · Irregular Mole: No  · Sun Burn: No  · Dry Skin: No  · Skin Color Changes: No  · Scar or Keloid: No  · Cold Sores/Fever Blisters: No  · Bacterial Infections/MRSA: No  · Anxiety: No  · Depression: No  · Suicidal or Homicidal Thoughts: No      PSYCH: Normal mood and affect  EYES: Normal conjunctiva  ENT: Normal lips and oral mucosa  CARDIOVASCULAR: No edema  RESPIRATORY: Normal respirations  HEME/LYMPH/IMMUNO:  No regional lymphadenopathy except as noted below in ASSESSMENT AND PLAN BY DIAGNOSIS    FULL ORGAN SYSTEM SKIN EXAM (SKIN)    Scalp Normal except as noted below in Assessment       LICHEN PLANUS     Physical Exam:  · Anatomic Location Affected:  vertex  scalp   · Morphological Description:  erythema plaque with perifollicular scale   · Pertinent Positives:  · Pertinent Negatives:     Additional History of Present Condition:  Patient has been using  dutasteride 0 5 and minoxidil 2 5 daily  She states she has noticed a some improvement but she has some flares       Assessment and Plan:  Based on a thorough discussion of this condition and the management approach to it (including a comprehensive discussion of the known risks, side effects and potential benefits of treatment), the patient (family) agrees to implement the following specific plan:    · Kenalog injection done in the office today   · Continue with minoxidil 2 5 mg by mouth daily  · Continue dutasteride 0 5 mg by mouth daily (patient has refills 0  · Follow up in 2 months     What is lichen planus? Lichen planus is a chronic inflammatory skin condition affecting the skin and mucosal surfaces  There are several clinical types of lichen planus that share similar features on histopathology  · Cutaneous lichen planus  · Mucosal lichen planus  · Lichen planopilaris  · Lichen planus of the nails  · Lichen planus pigmentosus  · Lichenoid drug eruption     Who gets lichen planus? Lichen planus affects about one in one hundred people worldwide, mostly affecting adults over the age of 36 years  About half those affected have oral lichen planus, which is more common in women than in men  About 91% have lichen planus of the nails      What causes lichen planus?   Lichen planus is a T cell-mediated autoimmune disorder, in which inflammatory cells attack an unknown protein within the skin and mucosal keratinocytes  Contributing factors to lichen planus may include:  · Genetic predisposition  · Physical and emotional stress  · Injury to the skin; lichen planus often appears where the skin has been scratched or after surgery -- this is called the isomorphic response (koebnerisation)  · Localized skin disease such as herpes zoster--isotopic response  · Systemic viral infection, such as hepatitis C (which might modify self-antigens on the surface of basal keratinocytes)  · Contact allergy, such as to metal fillings in oral lichen planus (rare)  · Drugs; gold, quinine, quinidine and others can cause a lichenoid rash     A lichenoid inflammation is also notable in [id number] disease, a complication of a bone marrow transplant      What are the clinical features of lichen planus? Lichen planus may cause a small number or many lesions on the skin and mucosal surfaces      Cutaneous lichen planus  The usual presentation of the disease is classical lichen planus  Symptoms can range from none (uncommon) to intense itch  · Papules and polygonal plaques are shiny, flat-topped and firm on palpation  · The plaques are crossed by fine white lines called Adelfo striae  · Hypertrophic lichen planus can be scaly  · Bullous lichen planus is rare  · Size ranges from pinpoint to larger than a centimeter  · Distribution may be scattered, clustered, linear, annular or actinic (sun-exposed sites such as face, neck and backs of the hands)  · Location can be anywhere, but most often front of the wrists, lower back, and ankles  · Colour depends on the patient's skin type  New papules and plaques often have a purple or violet hue, except on palms and soles where they are yellowish brown    · Plaques resolve after some months to leave greyish-brown post-inflammatory macules that can take a year or longer to fade      Oral lichen planus  The mouth is often the only affected area  Oral lichen planus often involves the inside of the cheeks and the sides of the tongue, but the gums and lips may also be involved  The most common patterns are:  · Painless white streaks in a lacy or fern-like pattern  · Painful and persistent erosions and ulcers (erosive lichen planus)  · Diffuse redness and peeling of the gums (desquamative gingivitis)  · Localized inflammation of the gums adjacent to amalgam fillings     Vulval lichen planus  Lichen planus may affect labia majora, labia minora and vaginal introitus  Presentation includes:  · Painless white streaks in a lacy or fern-like pattern  · Painful and persistent erosions and ulcers (erosive lichen planus )  · Scarring, resulting in adhesions, resorption of labia minora and introital stenosis  · Painful desquamative vaginitis, preventing intercourse and causing a mucky vaginal discharge  The eroded vagina may bleed easily on contact  · Overlap with vulval lichen sclerosus, an inflammatory skin disorder that most commonly affects women over 48years of age      Penile lichen planus  · Penile lichen planus usually presents with classical papules in a ring around the glans  White streaks and erosive lichen planus may occur but are less common      Other mucosal sites  · Erosive lichen planus uncommonly affects the lacrimal glands, eyelids, external ear canal, oesophagus, larynx, bladder and anus      Lichen planopilaris  · Lichen planopilaris presents as tiny red spiny follicular papules on the scalp or less often, elsewhere on the body  Rarely, blistering occurs in the lesions  Destruction of the hair follicles leads to permanently bald patches characterized by sparse “lonely hairs”  · Frontal fibrosing alopecia is a form of lichen planopilaris that affects the anterior scalp, forehead and eyebrows  · Pseudopelade of Brocq is probably a variant of lichen planus without inflammation or scaling   Areas of scarring without hair slowly appear, described as “like footprints in the snow”     Lichen planus pigmentosus  · Lichen planus pigmentosus describes ill-defined oval, greyish brown marks on the face and neck or trunk and limbs without an inflammatory phase  It is a form of acquired dermal macular hyperpigmentation  It can be provoked by sun exposure, but it can also arise in sun-protected sites such as the armpits  It has diffuse, reticulate and diffuse patterns  Lichen planus pigmentosus is similar to erythema dyschromicum perstans and may be the same disease  · Lichen planus pigmentosus may rarely affect the lips, resulting in a patchy dark pigmentation on upper and lower lips      Lichenoid drug eruption  · Lichenoid drug eruption refers to a lichen planus-like rash caused by medications  Asymptomatic or itchy; pink, brown or purple; flat, slightly scaly patches most often arise on the trunk  The oral mucosa (oral lichenoid reaction) and other sites are also sometimes affected  Many drugs can rarely cause lichenoid eruptions  The most common are:  · Gold  · Hydroxychloroquine  · Captopril     What are the complications of lichen planus? Hypertrophic lichen planus may resemble squamous cell carcinoma  However, rarely, longstanding erosive lichen planus can result in true squamous cell carcinoma, most often in the mouth (oral cancer) or on the vulva (vulval cancer) or penis (penile cancer)  This should be suspected if there is an enlarging nodule or an ulcer with thickened edges in these sites  Cancer is more common in smokers, those with a history of cancer in mucosal sites, and in those who carry sexually acquired and oncogenic human papillomavirus  Cancer from other forms of lichen planus is rare      How is lichen planus diagnosed? In most cases, lichen planus is diagnosed by observing its clinical features  A biopsy is often recommended to confirm or make the diagnosis and to look for cancer   The histopathological signs are of a lichenoid tissue reaction affecting the epidermis  Typical features include:  · Irregularly thickened epidermis  · Degenerative skin cells  · Liquefaction degeneration of the basal layer of the epidermis  · Band of inflammatory cells just beneath the epidermis  · Melanin (pigment) beneath the epidermis     Direct immunofluorescent staining may reveal deposits of immunoglobulins at the base of the epidermis      Patch tests may be recommended for patients with oral lichen planus affecting the gums and who have amalgam fillings, to assess for contact allergy to thiomersal (a mercurial compound)     What is the treatment for lichen planus? Treatment is not always necessary  Local treatments for symptomatic cutaneous or mucosal disease are:  · Potent topical steroids  · Topical calcineurin inhibitors, tacrolimus ointment and pimecrolimus cream  · Topical retinoids  · Intralesional steroid injections     Systemic treatment for widespread lichen planus or severe local disease often includes a 1 to 3-month course of oral prednisone, while commencing another agent from the following list:  · Acitretin  · Hydroxychloroquine  · Methotrexate  · Azathioprine  · Mycophenolate mofetil  · Phototherapy     In cases of oral lichen planus affecting the gums with contact allergy to mercury, the lichen planus may resolve on replacing the fillings with composite material  If the lichen planus is not due to mercury allergy, removing amalgam fillings is very unlikely to result in a cure  Anecdotal success is reported from long courses of oral antibiotics and oral antifungal agents  Lichen planopilaris is reported to improve with pioglitazone      What is the outlook for lichen planus? Cutaneous lichen planus tends to clear within a couple of years in most people, but mucosal lichen planus is more likely to persist for a decade or longer  Spontaneous recovery is unpredictable, and lichen planus may recur at a later date  Scarring is permanent, including balding of the scalp  PROCEDURE:  INTRALESIONAL STEROID INJECTION (KENALOG INJECTION)    Purpose: Triamcinolone is a synthetic glucocorticoid corticosteroid that has marked anti-inflammatory action  It is prepared in sterile aqueous suspension suitable for injecting directly into a lesion on or immediately below the skin to treat a dermal inflammatory process  Indications: It is indicated for alopecia areata; inflammatory acne cysts; discoid lupus erythematosus; keloids and hypertrophic scars; inflammatory lesions of granuloma annulare, lichen planus, lichen simplex chronicus (neurodermatitis), psoriatic plaques, and other localized inflammatory skin conditions  Potential Side Effects: I understand that triamcinolone injection can potentially cause early and/or delayed adverse effects such as:   • Pain   • Impaired wound healing   • Increased hair growth   • Bleeding   • White or brown marks   • Steroid acne   • Infection   • Telangiectasia   • Skin thinning   • Cutaneous and subcutaneous lipoatrophy (most common) appearing as skin indentations or dimples around the injection sites a few weeks after treatment     PROCEDURE NOTE:  After verbal and written consent were obtained, the to-be-treated area was wiped and cleaned with rubbing alcohol 70%  Then, a total of 2 mL of Kenalog CONCENTRATION:  10 mg/mL   (Lot# [id number]; Expiration 11-, NDC#: 1313-0887-61) was injected intralesionally into a total of 1 lesion/s on the following anatomic areas:  Vertex  using a 3-mL syringe and a 30-gauge needle  There was less than 1 mL of blood loss and little to no discomfort  The area was bandaged with a Band-aid  The patient tolerated the procedure well and remained in the office for observation  With no signs of an adverse reaction, the patient was eventually discharged from clinic        Scribe Attestation    I,:  Erin Briscoe am acting as a scribe while in the presence of the attending physician :       I,:  Silvana Aguilar MD personally performed the services described in this documentation    as scribed in my presence :

## 2022-10-18 NOTE — PATIENT INSTRUCTIONS
LICHEN PLANUS        Assessment and Plan:  Based on a thorough discussion of this condition and the management approach to it (including a comprehensive discussion of the known risks, side effects and potential benefits of treatment), the patient (family) agrees to implement the following specific plan:    Kenalog injection done in the office today   Continue with minoxidil 2 5 mg by mouth daily  Continue dutasteride 0 5 mg by mouth daily (patient has refills )  Follow up in 2 months     What is lichen planus? Lichen planus is a chronic inflammatory skin condition affecting the skin and mucosal surfaces  There are several clinical types of lichen planus that share similar features on histopathology  Cutaneous lichen planus  Mucosal lichen planus  Lichen planopilaris  Lichen planus of the nails  Lichen planus pigmentosus  Lichenoid drug eruption     Who gets lichen planus? Lichen planus affects about one in one hundred people worldwide, mostly affecting adults over the age of 36 years  About half those affected have oral lichen planus, which is more common in women than in men  About 99% have lichen planus of the nails  What causes lichen planus? Lichen planus is a T cell-mediated autoimmune disorder, in which inflammatory cells attack an unknown protein within the skin and mucosal keratinocytes    Contributing factors to lichen planus may include:  Genetic predisposition  Physical and emotional stress  Injury to the skin; lichen planus often appears where the skin has been scratched or after surgery -- this is called the isomorphic response (koebnerisation)  Localized skin disease such as herpes zoster--isotopic response  Systemic viral infection, such as hepatitis C (which might modify self-antigens on the surface of basal keratinocytes)  Contact allergy, such as to metal fillings in oral lichen planus (rare)  Drugs; gold, quinine, quinidine and others can cause a lichenoid rash     A lichenoid inflammation is also notable in zggje-ldwhbq-kbyt disease, a complication of a bone marrow transplant  What are the clinical features of lichen planus? Lichen planus may cause a small number or many lesions on the skin and mucosal surfaces  Cutaneous lichen planus  The usual presentation of the disease is classical lichen planus  Symptoms can range from none (uncommon) to intense itch  Papules and polygonal plaques are shiny, flat-topped and firm on palpation  The plaques are crossed by fine white lines called Adelfo striae  Hypertrophic lichen planus can be scaly  Bullous lichen planus is rare  Size ranges from pinpoint to larger than a centimeter  Distribution may be scattered, clustered, linear, annular or actinic (sun-exposed sites such as face, neck and backs of the hands)  Location can be anywhere, but most often front of the wrists, lower back, and ankles  Colour depends on the patient's skin type  New papules and plaques often have a purple or violet hue, except on palms and soles where they are yellowish brown  Plaques resolve after some months to leave greyish-brown post-inflammatory macules that can take a year or longer to fade  Oral lichen planus  The mouth is often the only affected area  Oral lichen planus often involves the inside of the cheeks and the sides of the tongue, but the gums and lips may also be involved  The most common patterns are:  Painless white streaks in a lacy or fern-like pattern  Painful and persistent erosions and ulcers (erosive lichen planus)  Diffuse redness and peeling of the gums (desquamative gingivitis)  Localized inflammation of the gums adjacent to amalgam fillings     Vulval lichen planus  Lichen planus may affect labia majora, labia minora and vaginal introitus   Presentation includes:  Painless white streaks in a lacy or fern-like pattern  Painful and persistent erosions and ulcers (erosive lichen planus )  Scarring, resulting in adhesions, resorption of labia minora and introital stenosis  Painful desquamative vaginitis, preventing intercourse and causing a mucky vaginal discharge  The eroded vagina may bleed easily on contact  Overlap with vulval lichen sclerosus, an inflammatory skin disorder that most commonly affects women over 48years of age  Penile lichen planus  Penile lichen planus usually presents with classical papules in a ring around the glans  White streaks and erosive lichen planus may occur but are less common  Other mucosal sites  Erosive lichen planus uncommonly affects the lacrimal glands, eyelids, external ear canal, oesophagus, larynx, bladder and anus  Lichen planopilaris  Lichen planopilaris presents as tiny red spiny follicular papules on the scalp or less often, elsewhere on the body  Rarely, blistering occurs in the lesions  Destruction of the hair follicles leads to permanently bald patches characterized by sparse “lonely hairs”  Frontal fibrosing alopecia is a form of lichen planopilaris that affects the anterior scalp, forehead and eyebrows  Pseudopelade of Brocq is probably a variant of lichen planus without inflammation or scaling  Areas of scarring without hair slowly appear, described as “like footprints in the snow”  Lichen planus pigmentosus  Lichen planus pigmentosus describes ill-defined oval, greyish brown marks on the face and neck or trunk and limbs without an inflammatory phase  It is a form of acquired dermal macular hyperpigmentation  It can be provoked by sun exposure, but it can also arise in sun-protected sites such as the armpits  It has diffuse, reticulate and diffuse patterns  Lichen planus pigmentosus is similar to erythema dyschromicum perstans and may be the same disease  Lichen planus pigmentosus may rarely affect the lips, resulting in a patchy dark pigmentation on upper and lower lips       Lichenoid drug eruption  Lichenoid drug eruption refers to a lichen planus-like rash caused by medications  Asymptomatic or itchy; pink, brown or purple; flat, slightly scaly patches most often arise on the trunk  The oral mucosa (oral lichenoid reaction) and other sites are also sometimes affected  Many drugs can rarely cause lichenoid eruptions  The most common are:  Gold  Hydroxychloroquine  Captopril     What are the complications of lichen planus? Hypertrophic lichen planus may resemble squamous cell carcinoma  However, rarely, longstanding erosive lichen planus can result in true squamous cell carcinoma, most often in the mouth (oral cancer) or on the vulva (vulval cancer) or penis (penile cancer)  This should be suspected if there is an enlarging nodule or an ulcer with thickened edges in these sites  Cancer is more common in smokers, those with a history of cancer in mucosal sites, and in those who carry sexually acquired and oncogenic human papillomavirus  Cancer from other forms of lichen planus is rare  How is lichen planus diagnosed? In most cases, lichen planus is diagnosed by observing its clinical features  A biopsy is often recommended to confirm or make the diagnosis and to look for cancer  The histopathological signs are of a lichenoid tissue reaction affecting the epidermis  Typical features include:  Irregularly thickened epidermis  Degenerative skin cells  Liquefaction degeneration of the basal layer of the epidermis  Band of inflammatory cells just beneath the epidermis  Melanin (pigment) beneath the epidermis     Direct immunofluorescent staining may reveal deposits of immunoglobulins at the base of the epidermis  Patch tests may be recommended for patients with oral lichen planus affecting the gums and who have amalgam fillings, to assess for contact allergy to thiomersal (a mercurial compound)  What is the treatment for lichen planus? Treatment is not always necessary   Local treatments for symptomatic cutaneous or mucosal disease are:  Potent topical steroids  Topical calcineurin inhibitors, tacrolimus ointment and pimecrolimus cream  Topical retinoids  Intralesional steroid injections     Systemic treatment for widespread lichen planus or severe local disease often includes a 1 to 3-month course of oral prednisone, while commencing another agent from the following list:  Acitretin  Hydroxychloroquine  Methotrexate  Azathioprine  Mycophenolate mofetil  Phototherapy     In cases of oral lichen planus affecting the gums with contact allergy to mercury, the lichen planus may resolve on replacing the fillings with composite material  If the lichen planus is not due to mercury allergy, removing amalgam fillings is very unlikely to result in a cure  Anecdotal success is reported from long courses of oral antibiotics and oral antifungal agents  Lichen planopilaris is reported to improve with pioglitazone  What is the outlook for lichen planus? Cutaneous lichen planus tends to clear within a couple of years in most people, but mucosal lichen planus is more likely to persist for a decade or longer  Spontaneous recovery is unpredictable, and lichen planus may recur at a later date  Scarring is permanent, including balding of the scalp

## 2022-10-19 ENCOUNTER — PROCEDURE VISIT (OUTPATIENT)
Dept: UROLOGY | Facility: CLINIC | Age: 82
End: 2022-10-19
Payer: COMMERCIAL

## 2022-10-19 VITALS
BODY MASS INDEX: 20.55 KG/M2 | HEIGHT: 63 IN | WEIGHT: 116 LBS | SYSTOLIC BLOOD PRESSURE: 130 MMHG | DIASTOLIC BLOOD PRESSURE: 80 MMHG

## 2022-10-19 DIAGNOSIS — R32 URINARY INCONTINENCE, UNSPECIFIED TYPE: ICD-10-CM

## 2022-10-19 PROCEDURE — 64566 NEUROELTRD STIM POST TIBIAL: CPT

## 2022-10-19 NOTE — PROGRESS NOTES
10/19/2022    Cristel Pap  1940  [id number]    Diagnosis  Chief Complaint     Urinary Incontinence         Patient presents for PTNS 1 of 12 managed by our office    Plan  Return next week for 2 of 12      Vitals:    10/19/22 1322   BP: 130/80   Weight: 52 6 kg (116 lb)   Height: 5' 3" (1 6 m)         Procedure PTNS    History:  Date onset of symptoms: a couple years ago  Behavior modification: yes  Biofeedback:no  E-Stim:no  Drugs:  did not like side effects  Other: did have sling back in     Session 1 of 12    Ankle used: left  Treatment settin  Duration of treatment: 30 minutes  Lead lot #:[id number]  Expiration Date:24  Feeling/Response:yes at toes      Bladder Diary Summary:  Caffeine cups per day:1 cup per day  Alcohol- number of drinks per day:1 glass wine on weekend  Daytime voids:9  Nighttime voids:4  Urgency:2-3  Incontinence- episodes per day:2-3 per day    Treatment Plan  Increase Fluids Yes  Decrease Caffeine Yes  Decrease FluidsYes at bedtime  Urge reduction techniques Yes  Kegels Yes  Timed voiding Yes  No fluids before bed No          Jackquline File

## 2022-10-19 NOTE — PROGRESS NOTES
I supervised the Advanced Practitioner  I reviewed the Advanced Practitioner note and agree      Isiah Stovall MD 10/19/22

## 2022-10-20 ENCOUNTER — TELEPHONE (OUTPATIENT)
Dept: DERMATOLOGY | Facility: CLINIC | Age: 82
End: 2022-10-20

## 2022-10-20 DIAGNOSIS — L71.9 ROSACEA: Primary | ICD-10-CM

## 2022-10-20 RX ORDER — DOXYCYCLINE HYCLATE 20 MG
20 TABLET ORAL 2 TIMES DAILY
Qty: 60 TABLET | Refills: 1 | Status: SHIPPED | OUTPATIENT
Start: 2022-10-20 | End: 2022-11-20

## 2022-10-20 NOTE — PROGRESS NOTES
Having rosacea flare from ILTA for LPP  Call patient and discussed options   Chose to use low dose doxy for 2-4 weeks

## 2022-10-20 NOTE — TELEPHONE ENCOUNTER
Returned call  Patient is having a rosacea flare  Discussed options    Patient will start low dose doxycycline for 2-4 weeks

## 2022-10-20 NOTE — TELEPHONE ENCOUNTER
Pt called stating she is having an allergic reaction to the kenalog injection, she would like a call back on what to do, thanks

## 2022-10-26 ENCOUNTER — PROCEDURE VISIT (OUTPATIENT)
Dept: UROLOGY | Facility: CLINIC | Age: 82
End: 2022-10-26
Payer: COMMERCIAL

## 2022-10-26 VITALS — SYSTOLIC BLOOD PRESSURE: 120 MMHG | HEIGHT: 63 IN | BODY MASS INDEX: 20.55 KG/M2 | DIASTOLIC BLOOD PRESSURE: 70 MMHG

## 2022-10-26 DIAGNOSIS — N39.44 NOCTURNAL ENURESIS: ICD-10-CM

## 2022-10-26 PROCEDURE — 64566 NEUROELTRD STIM POST TIBIAL: CPT

## 2022-10-26 NOTE — PROGRESS NOTES
10/26/2022    Vidhya Estevez  1940  [id number]    Diagnosis  Chief Complaint     Urinary Incontinence         Patient presents for PTNS 2 of 12 managed by our office    Plan  Return 1 wk for 3rd tx      Vitals:    10/26/22 1103   BP: 120/70   BP Location: Left arm   Height: 5' 3" (1 6 m)         Procedure PTNS      Session 2 of 12    Ankle used: right  Treatment settin  Duration of treatment: 30 minutes  Lead lot #:[id number]  Expiration Date:24  Feeling/Response:toes and top of foot    Patient Goals and Progress  Decreased Urgency No  Decreased Frequency No  Episodes of incontinence   Sleep Through Night No              Lestine Mattes

## 2022-11-02 ENCOUNTER — PROCEDURE VISIT (OUTPATIENT)
Dept: UROLOGY | Facility: CLINIC | Age: 82
End: 2022-11-02

## 2022-11-02 VITALS — BODY MASS INDEX: 20.55 KG/M2 | DIASTOLIC BLOOD PRESSURE: 72 MMHG | SYSTOLIC BLOOD PRESSURE: 124 MMHG | HEIGHT: 63 IN

## 2022-11-02 DIAGNOSIS — R32 URINARY INCONTINENCE, UNSPECIFIED TYPE: ICD-10-CM

## 2022-11-02 NOTE — PROGRESS NOTES
11/2/2022    Neftali Hernandez  1940  [id number]    Diagnosis  Chief Complaint     Urinary Incontinence         Patient presents for PTNS 3 of 12 managed by our office    Plan  Return for PTNS next week      Vitals:    11/02/22 1018   BP: 124/72   BP Location: Right arm   Height: 5' 3" (1 6 m)         Procedure PTNS    Session 3 of 12    Ankle used: right  Treatment setting:3  Duration of treatment: 30 minutes  Lead lot #:[id number]  Expiration Date:2/17/24  Feeling/Response:ankle    Bladder Diary Summary:  Patient reports only waking up once during the night to urinate        Dedra Connelly

## 2022-11-09 ENCOUNTER — PROCEDURE VISIT (OUTPATIENT)
Dept: UROLOGY | Facility: CLINIC | Age: 82
End: 2022-11-09

## 2022-11-09 VITALS — SYSTOLIC BLOOD PRESSURE: 108 MMHG | DIASTOLIC BLOOD PRESSURE: 60 MMHG

## 2022-11-09 DIAGNOSIS — R32 URINARY INCONTINENCE, UNSPECIFIED TYPE: ICD-10-CM

## 2022-11-09 NOTE — PROGRESS NOTES
11/9/2022    Ben Gallardo  1940  [id number]    Diagnosis  Chief Complaint     Urinary Incontinence         Patient presents for PTNS 4 of 12 managed by our office    Plan  Return for next PTNS      Vitals:    11/09/22 1023   BP: 108/60   BP Location: Right arm         Procedure PTNS      Session 4 of 12    Ankle used: left  Treatment setting:3  Duration of treatment: 30 minutes  Lead lot #:[id number]  Expiration Date:2/17/24  Feeling/Response:ankle/foot    Patient Goals and Progress  Decreased Urgency No  Decreased Frequency No,hard to determine  Episodes of incontinence No  Sleep Through Night No  Patient not sure if treatment is helpful  She has never worn pads for incontinence so cannot measure improvement         Opal Rice

## 2022-11-16 ENCOUNTER — PROCEDURE VISIT (OUTPATIENT)
Dept: UROLOGY | Facility: CLINIC | Age: 82
End: 2022-11-16

## 2022-11-16 VITALS — SYSTOLIC BLOOD PRESSURE: 130 MMHG | DIASTOLIC BLOOD PRESSURE: 64 MMHG

## 2022-11-16 DIAGNOSIS — R32 URINARY INCONTINENCE, UNSPECIFIED TYPE: ICD-10-CM

## 2022-11-16 NOTE — PROGRESS NOTES
11/16/2022    Daquan Marc  1940  [id number]    Diagnosis  Chief Complaint    Urinary Incontinence        Patient presents for PTNS 5 of 12 managed by our office    Plan  Return in 1 wk for next PTNS      Vitals:    11/16/22 1314   BP: 130/64   BP Location: Right arm         Procedure PTNS      Session 5 of 12    Ankle used: left  Treatment setting:3  Duration of treatment: 30 minutes  Lead lot #:[id number]  Expiration Date:2/17/24  Feeling/Response:yes    Patient Goals and Progress  Decreased Urgency Yes  Decreased Frequency Yes  Episodes of incontinence Yes  Sleep Through Night No, got up once a night      Bladder Diary Summary:  Nighttime voids:1  Incontinence- episodes per day one          Fernando Rasmussen, RN,BSN

## 2022-11-21 ENCOUNTER — PROCEDURE VISIT (OUTPATIENT)
Dept: UROLOGY | Facility: CLINIC | Age: 82
End: 2022-11-21

## 2022-11-21 VITALS — SYSTOLIC BLOOD PRESSURE: 118 MMHG | DIASTOLIC BLOOD PRESSURE: 78 MMHG

## 2022-11-21 DIAGNOSIS — R32 URINARY INCONTINENCE, UNSPECIFIED TYPE: Primary | ICD-10-CM

## 2022-11-21 NOTE — PROGRESS NOTES
11/21/2022    Juvenal Snowball  1940  [id number]    Diagnosis  Chief Complaint    Incontinence in female        Patient presents for PTNS 6 of 12 managed by our office    Plan  Return in 1 week for 7/12 PTNS  Patient will return next week with bladder diary      Vitals:    11/21/22 1104   BP: 118/78         Procedure PTNS      Session 6 of 12    Ankle used: right  Treatment setting:3  Duration of treatment: 30 minutes  Lead lot #:[id number]  Expiration Date:02/17/2024  Feeling/Response: arch of foot and toe flex              Charles Baldwin LPN

## 2022-11-25 ENCOUNTER — TELEPHONE (OUTPATIENT)
Dept: UROLOGY | Facility: MEDICAL CENTER | Age: 82
End: 2022-11-25

## 2022-11-25 DIAGNOSIS — R39.9 UTI SYMPTOMS: Primary | ICD-10-CM

## 2022-11-25 NOTE — TELEPHONE ENCOUNTER
I left detailed message for patient informing that I called [facility] [employer] [facility] at [phone] to confirm results are not in yet, they are currently in process  I explained will not result for about 3 days due to the type of test it is, and that [employer] [facility] has our fax number on file to send us report once it is resulted  Left our call back number for patient with any questions or concerns  Thank you

## 2022-11-25 NOTE — TELEPHONE ENCOUNTER
Patient called stating she went to lab [facility] in [place] for her urine test this morning  She wants to know if office received results  She knows its Friday but she wanted to know if at least someone can check with the lab and she can get medication before the end of the day  She would like a call back to see how to proceed       [facility] number [phone]     She can be reached at [phone]

## 2022-11-25 NOTE — TELEPHONE ENCOUNTER
Called spoke with patient having burning with urination for past couple days  Did take an at home test and showed postive  Pt requesting an order faxed to [facility] [address] patient will place order  Advised increase fluids, avoid bladder irritants and ED precautions reviewed  Orders faxed

## 2022-11-25 NOTE — TELEPHONE ENCOUNTER
Patient seen by Paulo Kellogg at [place]    Patient called stating she is having uti with frequency, burning and she would like to know if she can drop off a urine sample at the lab close to her In [place]  [facility]   She would need an order to be fax to them  Any questions call her        [facility] [phone]    She can be reached at [phone]

## 2022-11-27 LAB
APPEARANCE UR: CLEAR
BACTERIA UR CULT: NORMAL
BACTERIA URNS QL MICRO: NORMAL
BILIRUB UR QL STRIP: NEGATIVE
CASTS URNS QL MICRO: NORMAL /LPF
COLOR UR: YELLOW
EPI CELLS #/AREA URNS HPF: NORMAL /HPF (ref 0–10)
GLUCOSE UR QL: NEGATIVE
HGB UR QL STRIP: NEGATIVE
KETONES UR QL STRIP: NEGATIVE
LEUKOCYTE ESTERASE UR QL STRIP: NEGATIVE
Lab: NO GROWTH
MICRO URNS: NORMAL
MICRO URNS: NORMAL
NITRITE UR QL STRIP: NEGATIVE
PH UR STRIP: 6.5 [PH] (ref 5–7.5)
PROT UR QL STRIP: NEGATIVE
RBC #/AREA URNS HPF: NORMAL /HPF (ref 0–2)
SP GR UR: 1.01 (ref 1–1.03)
UROBILINOGEN UR STRIP-ACNC: 0.2 MG/DL (ref 0.2–1)
WBC #/AREA URNS HPF: NORMAL /HPF (ref 0–5)

## 2022-11-28 NOTE — TELEPHONE ENCOUNTER
Spoke to patient and gave negative urine culture results   Patient is not having symptoms at this time and will keep her appt 11/30 with PA MICHELLE

## 2022-11-30 ENCOUNTER — OFFICE VISIT (OUTPATIENT)
Dept: UROLOGY | Facility: CLINIC | Age: 82
End: 2022-11-30

## 2022-11-30 VITALS
WEIGHT: 116 LBS | SYSTOLIC BLOOD PRESSURE: 128 MMHG | BODY MASS INDEX: 20.55 KG/M2 | DIASTOLIC BLOOD PRESSURE: 68 MMHG | HEIGHT: 63 IN

## 2022-11-30 DIAGNOSIS — N32.81 OAB (OVERACTIVE BLADDER): Primary | ICD-10-CM

## 2022-11-30 NOTE — PROGRESS NOTES
2022    Isabel Watkins  1940  [id number]    Diagnosis  Chief Complaint    Urinary Incontinence        Patient presents for PTNS 7 of 12 managed by our office    Plan  Return for next PTNS one week      Vitals:    22 1304   BP: 128/68   BP Location: Left arm   Patient Position: Sitting   Cuff Size: Adult   Weight: 52 6 kg (116 lb)   Height: 5' 3" (1 6 m)         Procedure PTNS    Session 7     Ankle used: right  Treatment settin  Duration of treatment: 30 minutes  Lead lot #:[id number]  Expiration Date:24  Feeling/Response:toes and site        Jo Pierre, RN,BSN

## 2022-12-01 NOTE — PROGRESS NOTES
UROLOGY PROGRESS NOTE   Patient Identifiers: Margarita Carias (MRN [id number])  Date of Service: 12/1/2022    Subjective:    63-year-old female in extremely good shape with a history of urinary frequency and incontinence  She had a sling procedure in [place] in 2012 followed by Botox  Unfortunately she had urinary retention after her Botox procedure  She completed pelvic floor physical therapy and is now undergoing PTNS  She is custodial through the procedure  She has minimal frequency during the day and 1 time per night nocturia  Almost no incontinence  She asked many questions about how she can improve her admittedly minimal symptoms  She exercises daily with cardio and weights      Reason for visit:  Overactive bladder follow-up      Objective:     VITALS:    Vitals:    11/30/22 1304   BP: 128/68           LABS:  Lab Results   Component Value Date    HGB 12 2 04/20/2021    HCT 36 6 04/20/2021    WBC 8 0 04/20/2021     04/20/2021   ]    Lab Results   Component Value Date     05/15/2017    K 4 5 05/15/2017     05/15/2017    CO2 28 05/15/2017    BUN 17 05/15/2017    CREATININE 0 72 05/15/2017    CALCIUM 9 0 05/15/2017   ]        INPATIENT MEDS:    Current Outpatient Medications:   •  b complex vitamins capsule, Take 1 capsule by mouth daily, Disp: , Rfl:   •  cyanocobalamin (VITAMIN B-12) 100 mcg tablet, Take by mouth daily, Disp: , Rfl:   •  cycloSPORINE (RESTASIS) 0 05 % ophthalmic emulsion, 1 drop by Tube route 2 (two) times a day, Disp: , Rfl:   •  dutasteride (AVODART) 0 5 mg capsule, TAKE ONE CAPSULE BY MOUTH EVERY DAY, Disp: 90 capsule, Rfl: 2  •  estradiol (ESTRACE) 0 1 mg/g vaginal cream, Insert into the vagina 2 (two) times a week, Disp: , Rfl:   •  folic acid (FOLVITE) 1 mg tablet, TAKE ONE TABLET BY MOUTH EVERY DAY WHILE ON METHOTREXATE TO PREVENT SIDE EFFECTS (GENERIC FOR FOLVITE), Disp: 90 tablet, Rfl: 2  •  Ivermectin (Soolantra) 1 % CREA, Apply topically 1-2 times a day to face area as needed for rosacea , Disp: 45 g, Rfl: 5  •  Lutein 40 MG CAPS, Take by mouth daily  , Disp: , Rfl:   •  Magnesium 500 MG CAPS, Take by mouth, Disp: , Rfl:   •  Magnesium Gluconate 550 MG TABS, Take 30 mg by mouth 2 (two) times a day  , Disp: , Rfl:   •  meloxicam (MOBIC) 15 mg tablet, , Disp: , Rfl:   •  metoprolol succinate (TOPROL-XL) 25 mg 24 hr tablet, Take 25 mg by mouth in the morning , Disp: , Rfl:   •  minoxidil (LONITEN) 2 5 mg tablet, Take 1/2 a tablet by mouth once daily, Disp: 90 tablet, Rfl: 2  •  tiZANidine (ZANAFLEX) 2 mg tablet, , Disp: , Rfl:       Physical Exam:   /68 (BP Location: Left arm, Patient Position: Sitting, Cuff Size: Adult)   Ht 5' 3" (1 6 m)   Wt 52 6 kg (116 lb)   BMI 20 55 kg/m²   GEN: no acute distress    RESP: breathing comfortably with no accessory muscle use    ABD: soft, non-tender, non-distended   INCISION:    EXT: no significant peripheral edema     RADIOLOGY:   None     Assessment:   1  Overactive bladder     Plan:   -I reviewed the findings with the patient as well as her report over the last weeks of PTNS  She seems to be doing extremely well from my point of view  She has met very minimal symptoms   -she will complete her PTNS and follow-up afterwards  No further intervention is recommended    -  -

## 2022-12-02 ENCOUNTER — TELEPHONE (OUTPATIENT)
Dept: DERMATOLOGY | Facility: CLINIC | Age: 82
End: 2022-12-02
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
## 2022-12-06 ENCOUNTER — PROCEDURE VISIT (OUTPATIENT)
Dept: UROLOGY | Facility: CLINIC | Age: 82
End: 2022-12-06

## 2022-12-06 VITALS — SYSTOLIC BLOOD PRESSURE: 120 MMHG | DIASTOLIC BLOOD PRESSURE: 68 MMHG

## 2022-12-06 DIAGNOSIS — R32 URINARY INCONTINENCE, UNSPECIFIED TYPE: ICD-10-CM

## 2022-12-06 NOTE — PROGRESS NOTES
12/6/2022    Anisha Raines  1940  [id number]    Diagnosis  Chief Complaint    Urinary Incontinence        Patient presents for PTNS 8 of 12 managed by our office    Plan  Return in 1 wk for next PTNS      Vitals:    12/06/22 1034   BP: 120/68   BP Location: Right arm   Patient Position: Sitting         Procedure PTNS    Session 8 of 12    Ankle used: left  Treatment setting:15  Duration of treatment: 30 minutes  Lead lot #:[id number]  Expiration Date:2/17/24  Feeling/Response:tingling in toes    Patient Goals and Progress  Decreased Urgency Yes  Decreased Frequency Yes      Perla Mcnair, RN,BSN

## 2022-12-08 ENCOUNTER — PROCEDURE VISIT (OUTPATIENT)
Dept: DERMATOLOGY | Facility: CLINIC | Age: 82
End: 2022-12-08

## 2022-12-08 DIAGNOSIS — L81.8 TRAUMATIC TATTOO: ICD-10-CM

## 2022-12-08 DIAGNOSIS — L81.6 POIKILODERMA: Primary | ICD-10-CM

## 2022-12-08 NOTE — PROGRESS NOTES
12/8/2022    DO NOT BILL PATIENT OR INSURANCE  PATIENT PAID CASH AT TIME OF VISIT  Mariama PRIMA (Flashlamp Pulsed-Dye and Long Pulsed ND:Yag Laser) Treatment     PROCEDURE: Flashlamp Pulsed-Dye Laser Treatment  Place of Surgery:  office  Surgeon and : Bernardo Beaver   Assistant: Antonoi   Photography: yes             After discussing potential procedure related risks including but not limited to pain, purpura, blistering, scarring, discoloration, “footprinting,” recurrence, inefficacy, need for additional treatment, and undesirable cosmetic written and verbal consent were obtained  Anesthetic: none      Safety Precautions:  Fire extinguisher present/Window covered/Staff and patient eyes covered/Warning sign posted     Treatment number: 1  Interim History/Comments:    Percent lightening:   Growth Phase:     Pre-operative Diagnosis: Purpura and poikiloderma  Indications for Surgery:  Cosmesis  Post-operative Diagnosis: same as pre-operative     Parameters: The V Beam laser was set to 595nm wavelength  The cooling device was set to 30 msec duration/20 msec delay     Procedure Note:  After obtaining appropriate consent, patient was brought back to the operating room  Time out was performed  Patient's name, identification and intended procedure were verified  Eye coverings eye shield inserted/placed  The following anatomic locations were treated at the following PDL laser parameters of       10 mm Spot Size; 6 J Fluence; 10 msec Pulse width:    ANATOMIC LOCATION:  Bilateral Shins; TOTAL AREA (Cm/2) TREATED:  750 cm2       Also hit the left knee where she fell on gravel with Q-Switch ND:Yag Spot Size 6; 1 4 J     Tolerance: Well-tolerated  Complications:   Estimated Blood Loss:  0 cc   Other procedures:         Findings and plans were discussed with the family    Post-op Care: Clobetasol applied in office; vaseline "24/7" to legs  Disposition:  Discharged to home  Follow-up:  4-6 weeks    PATIENT'S AFTER-CARE INSTRUCTIONS:    • Swelling may occur after the laser treatment  This may last for several days  A cool washcloth or ice pack can be applied if it helps him/her feel better  • Bruising or purplish discoloration may be present for up to 2 weeks in the treated area  Keep the area moist with topical Aquaphor or petroleum jelly until the bruising resolves  • Blistering is rare  If this occurs, generously apply bacitracin ointment until complete healing occurs  • Encourage your child to rest and avoid activities that could result in injury to the treated skin  • Your child can take a bath or shower the day after the procedure  Avoid rubbing or scratching the treated area  • Avoid sun exposure after and between laser treatments  Sun exposure may cause pigmentation changes in the treated areas  In addition, sun exposure can darken and worsen red birthmarks  • Tylenol may be given for any post-operative discomfort (pain is usually minimal)  If severe pain occurs, call our office at [phone] (SKIN); after hours or on the weekends, you will be connected to our on-call dermatology service       CC: [employer]    I,:  Antonio Rocha am acting as a scribe while in the presence of the attending physician :       I,:  Macho Hahn MD personally performed the services described in this documentation    as scribed in my presence :

## 2022-12-08 NOTE — PATIENT INSTRUCTIONS
PATIENT'S AFTER-CARE INSTRUCTIONS:    Swelling may occur after the laser treatment  This may last for several days  A cool washcloth or ice pack can be applied if it helps him/her feel better  Bruising or purplish discoloration may be present for up to 2 weeks in the treated area  Keep the area moist with topical Aquaphor or petroleum jelly until the bruising resolves  Blistering is rare  If this occurs, generously apply bacitracin ointment until complete healing occurs  Encourage your child to rest and avoid activities that could result in injury to the treated skin  Your child can take a bath or shower the day after the procedure  Avoid rubbing or scratching the treated area  Avoid sun exposure after and between laser treatments  Sun exposure may cause pigmentation changes in the treated areas  In addition, sun exposure can darken and worsen red birthmarks  Tylenol may be given for any post-operative discomfort (pain is usually minimal)  If severe pain occurs, call our office at [phone] (SKIN); after hours or on the weekends, you will be connected to our on-call dermatology service

## 2022-12-10 DIAGNOSIS — L63.9 ALOPECIA AREATA: ICD-10-CM

## 2022-12-12 RX ORDER — MINOXIDIL 2.5 MG/1
TABLET ORAL
Qty: 90 TABLET | Refills: 2 | Status: SHIPPED | OUTPATIENT
Start: 2022-12-12 | End: 2022-12-19 | Stop reason: SDUPTHER

## 2022-12-14 ENCOUNTER — PROCEDURE VISIT (OUTPATIENT)
Dept: UROLOGY | Facility: CLINIC | Age: 82
End: 2022-12-14

## 2022-12-14 VITALS — HEIGHT: 63 IN | BODY MASS INDEX: 20.55 KG/M2 | SYSTOLIC BLOOD PRESSURE: 118 MMHG | DIASTOLIC BLOOD PRESSURE: 74 MMHG

## 2022-12-14 DIAGNOSIS — R32 URINARY INCONTINENCE, UNSPECIFIED TYPE: ICD-10-CM

## 2022-12-14 NOTE — PROGRESS NOTES
2022    Carlitos Nunes  1940  [id number]    Diagnosis  Chief Complaint    Urinary Incontinence        Patient presents for PTNS 9 of 12 managed by our office    Plan  Return 1 wk for next PTNS      Vitals:    22 0926   BP: 118/74   BP Location: Right arm   Patient Position: Sitting   Height: 5' 3" (1 6 m)         Procedure PTNS    Session 9     Ankle used: right  Treatment settin  Duration of treatment: 30 minutes  Lead lot #:[id number]    Expiration Date:24  Feeling/Response:kip Zayas, RN,BSN

## 2022-12-19 ENCOUNTER — OFFICE VISIT (OUTPATIENT)
Dept: DERMATOLOGY | Age: 82
End: 2022-12-19

## 2022-12-19 ENCOUNTER — APPOINTMENT (OUTPATIENT)
Dept: LAB | Facility: CLINIC | Age: 82
End: 2022-12-19

## 2022-12-19 VITALS — TEMPERATURE: 98.1 F

## 2022-12-19 DIAGNOSIS — R39.9 UTI SYMPTOMS: Primary | ICD-10-CM

## 2022-12-19 DIAGNOSIS — R39.9 UTI SYMPTOMS: ICD-10-CM

## 2022-12-19 DIAGNOSIS — L63.9 ALOPECIA AREATA: Primary | ICD-10-CM

## 2022-12-19 DIAGNOSIS — L66.1 LICHEN PLANOPILARIS: ICD-10-CM

## 2022-12-19 LAB
BACTERIA UR QL AUTO: NORMAL /HPF
BILIRUB UR QL STRIP: NEGATIVE
CLARITY UR: CLEAR
COLOR UR: YELLOW
GLUCOSE UR STRIP-MCNC: NEGATIVE MG/DL
HGB UR QL STRIP.AUTO: NEGATIVE
KETONES UR STRIP-MCNC: NEGATIVE MG/DL
LEUKOCYTE ESTERASE UR QL STRIP: NEGATIVE
NITRITE UR QL STRIP: NEGATIVE
NON-SQ EPI CELLS URNS QL MICRO: NORMAL /HPF
PH UR STRIP.AUTO: 6.5 [PH]
PROT UR STRIP-MCNC: NEGATIVE MG/DL
RBC #/AREA URNS AUTO: NORMAL /HPF
SP GR UR STRIP.AUTO: 1.01 (ref 1–1.03)
UROBILINOGEN UR STRIP-ACNC: <2 MG/DL
WBC #/AREA URNS AUTO: NORMAL /HPF

## 2022-12-19 RX ORDER — MINOXIDIL 2.5 MG/1
TABLET ORAL
Qty: 90 TABLET | Refills: 2 | Status: SHIPPED | OUTPATIENT
Start: 2022-12-19

## 2022-12-19 RX ORDER — NITROFURANTOIN 25; 75 MG/1; MG/1
100 CAPSULE ORAL 2 TIMES DAILY
Qty: 10 CAPSULE | Refills: 0 | Status: SHIPPED | OUTPATIENT
Start: 2022-12-19 | End: 2022-12-24

## 2022-12-19 RX ORDER — DUTASTERIDE 0.5 MG/1
0.5 CAPSULE, LIQUID FILLED ORAL DAILY
Qty: 90 CAPSULE | Refills: 2 | Status: SHIPPED | OUTPATIENT
Start: 2022-12-19

## 2022-12-19 NOTE — PROGRESS NOTES
[facility] Follow Up Note    Patient Name: Barrera Pearson  Encounter Date:12 19 2022    Today's Chief Concerns:  • Concern #1:  Lichen planus follow up   Current Medications:    Current Outpatient Medications:   •  b complex vitamins capsule, Take 1 capsule by mouth daily, Disp: , Rfl:   •  cyanocobalamin (VITAMIN B-12) 100 mcg tablet, Take by mouth daily, Disp: , Rfl:   •  cycloSPORINE (RESTASIS) 0 05 % ophthalmic emulsion, 1 drop by Tube route 2 (two) times a day, Disp: , Rfl:   •  dutasteride (AVODART) 0 5 mg capsule, TAKE ONE CAPSULE BY MOUTH EVERY DAY, Disp: 90 capsule, Rfl: 2  •  estradiol (ESTRACE) 0 1 mg/g vaginal cream, Insert into the vagina 2 (two) times a week, Disp: , Rfl:   •  folic acid (FOLVITE) 1 mg tablet, TAKE ONE TABLET BY MOUTH EVERY DAY WHILE ON METHOTREXATE TO PREVENT SIDE EFFECTS (GENERIC FOR FOLVITE), Disp: 90 tablet, Rfl: 2  •  Ivermectin (Soolantra) 1 % CREA, Apply topically 1-2 times a day to face area as needed for rosacea , Disp: 45 g, Rfl: 5  •  Lutein 40 MG CAPS, Take by mouth daily  , Disp: , Rfl:   •  Magnesium 500 MG CAPS, Take by mouth, Disp: , Rfl:   •  Magnesium Gluconate 550 MG TABS, Take 30 mg by mouth 2 (two) times a day  , Disp: , Rfl:   •  meloxicam (MOBIC) 15 mg tablet, , Disp: , Rfl:   •  metoprolol succinate (TOPROL-XL) 25 mg 24 hr tablet, Take 25 mg by mouth in the morning , Disp: , Rfl:   •  minoxidil (LONITEN) 2 5 mg tablet, TAKE ONE-HALF TABLET BY MOUTH EVERY DAY -GENERIC FOR LONITEN, Disp: 90 tablet, Rfl: 2  •  tiZANidine (ZANAFLEX) 2 mg tablet, , Disp: , Rfl:     CONSTITUTIONAL:   There were no vitals filed for this visit  Specific Alerts:    Have you been seen by a [facility] Dermatologist in the last 3 years? YES    Are you pregnant or planning to become pregnant? N/A    Are you currently or planning to be nursing or breast feeding?  N/A    Allergies   Allergen Reactions   • Dapsone Facial Swelling   • Epinephrine    • Mupirocin    • Seasonal Ic [Cholestatin] Itching   • Cephalosporins Rash       May we call your Preferred Phone number to discuss your specific medical information? YES    May we leave a detailed message that includes your specific medical information? YES    Have you traveled outside of the [facility] in the past 3 months? No    Do you currently have a pacemaker or defibrillator? No    Do you have any artificial heart valves, joints, plates, screws, rods, stents, pins, etc? No   - If Yes, were any placed within the last 2 years? Do you require any medications prior to a surgical procedure? No   - If Yes, for which procedure? - If Yes, what medications to you require? Are you taking any medications that cause you to bleed more easily ("blood thinners") No    Have you ever experienced a rapid heartbeat with epinephrine? No    Have you ever been treated with "gold" (gold sodium thiomalate) therapy? No    [facility] can help with wrinkles, "laugh lines," facial volume loss, "double chin," "love handles," age spots, and more  Are you interested in learning today about some of the skin enhancement procedures that we offer? (If Yes, please provide more detail) No    Review of Systems:  Have you recently had or currently have any of the following?     · Fever or chills: No  · Night Sweats: No  · Headaches: No  · Weight Gain: No  · Weight Loss: No  · Blurry Vision: No  · Nausea: No  · Vomiting: No  · Diarrhea: No  · Blood in Stool: No  · Abdominal Pain: No  · Itchy Skin: No  · Painful Joints: No  · Swollen Joints: No  · Muscle Pain: No  · Irregular Mole: No  · Sun Burn: No  · Dry Skin: No  · Skin Color Changes: No  · Scar or Keloid: No  · Cold Sores/Fever Blisters: No  · Bacterial Infections/MRSA: No  · Anxiety: No  · Depression: No  · Suicidal or Homicidal Thoughts: No      PSYCH: Normal mood and affect  EYES: Normal conjunctiva  ENT: Normal lips and oral mucosa  CARDIOVASCULAR: No edema  RESPIRATORY: Normal respirations  HEME/LYMPH/IMMUNO:  No regional lymphadenopathy except as noted below in ASSESSMENT AND PLAN BY DIAGNOSIS    FULL ORGAN SYSTEM SKIN EXAM (SKIN)    Scalp Normal except as noted below in Assessment       LICHEN PLANUS    Physical Exam:  • Anatomic Location Affected:  Vertex scalp   • Morphological Description: erythematous plaque with perifollicular scale   • Pertinent Positives:  • Pertinent Negatives: Additional History of Present Condition: previous treatment with intralesional injections , oral dutasteride and oral  minoxidil   Patient states very itchy scalp   Assessment and Plan:  Based on a thorough discussion of this condition and the management approach to it (including a comprehensive discussion of the known risks, side effects and potential benefits of treatment), the patient (family) agrees to implement the following specific plan:  • Kenalog intralesional done today in the office   • Continue with minoxidil 2 5 mg by mouth daily  • Continue dutasteride 0 5 mg by mouth daily     What is lichen planus? Lichen planus is a chronic inflammatory skin condition affecting the skin and mucosal surfaces  There are several clinical types of lichen planus that share similar features on histopathology  • Cutaneous lichen planus  • Mucosal lichen planus  • Lichen planopilaris  • Lichen planus of the nails  • Lichen planus pigmentosus  • Lichenoid drug eruption    Who gets lichen planus? Lichen planus affects about one in one hundred people worldwide, mostly affecting adults over the age of 36 years  About half those affected have oral lichen planus, which is more common in women than in men  About 64% have lichen planus of the nails  What causes lichen planus? Lichen planus is a T cell-mediated autoimmune disorder, in which inflammatory cells attack an unknown protein within the skin and mucosal keratinocytes    Contributing factors to lichen planus may include:  • Genetic predisposition  • Physical and emotional stress  • Injury to the skin; lichen planus often appears where the skin has been scratched or after surgery -- this is called the isomorphic response (koebnerisation)  • Localized skin disease such as herpes zoster--isotopic response  • Systemic viral infection, such as hepatitis C (which might modify self-antigens on the surface of basal keratinocytes)  • Contact allergy, such as to metal fillings in oral lichen planus (rare)  • Drugs; gold, quinine, quinidine and others can cause a lichenoid rash    A lichenoid inflammation is also notable in cqdxk-vjifhm-lvpu disease, a complication of a bone marrow transplant  What are the clinical features of lichen planus? Lichen planus may cause a small number or many lesions on the skin and mucosal surfaces  Cutaneous lichen planus  The usual presentation of the disease is classical lichen planus  Symptoms can range from none (uncommon) to intense itch  • Papules and polygonal plaques are shiny, flat-topped and firm on palpation  • The plaques are crossed by fine white lines called Adelfo striae  • Hypertrophic lichen planus can be scaly  • Bullous lichen planus is rare  • Size ranges from pinpoint to larger than a centimeter  • Distribution may be scattered, clustered, linear, annular or actinic (sun-exposed sites such as face, neck and backs of the hands)  • Location can be anywhere, but most often front of the wrists, lower back, and ankles  • Colour depends on the patient's skin type  New papules and plaques often have a purple or violet hue, except on palms and soles where they are yellowish brown  • Plaques resolve after some months to leave greyish-brown post-inflammatory macules that can take a year or longer to fade  Oral lichen planus  The mouth is often the only affected area   Oral lichen planus often involves the inside of the cheeks and the sides of the tongue, but the gums and lips may also be involved  The most common patterns are:  • Painless white streaks in a lacy or fern-like pattern  • Painful and persistent erosions and ulcers (erosive lichen planus)  • Diffuse redness and peeling of the gums (desquamative gingivitis)  • Localized inflammation of the gums adjacent to amalgam fillings    Vulval lichen planus  Lichen planus may affect labia majora, labia minora and vaginal introitus  Presentation includes:  • Painless white streaks in a lacy or fern-like pattern  • Painful and persistent erosions and ulcers (erosive lichen planus )  • Scarring, resulting in adhesions, resorption of labia minora and introital stenosis  • Painful desquamative vaginitis, preventing intercourse and causing a mucky vaginal discharge  The eroded vagina may bleed easily on contact  • Overlap with vulval lichen sclerosus, an inflammatory skin disorder that most commonly affects women over 48years of age  Penile lichen planus  • Penile lichen planus usually presents with classical papules in a ring around the glans  White streaks and erosive lichen planus may occur but are less common  Other mucosal sites  • Erosive lichen planus uncommonly affects the lacrimal glands, eyelids, external ear canal, oesophagus, larynx, bladder and anus  Lichen planopilaris  • Lichen planopilaris presents as tiny red spiny follicular papules on the scalp or less often, elsewhere on the body  Rarely, blistering occurs in the lesions  Destruction of the hair follicles leads to permanently bald patches characterized by sparse “lonely hairs”  • Frontal fibrosing alopecia is a form of lichen planopilaris that affects the anterior scalp, forehead and eyebrows  • Pseudopelade of Brocq is probably a variant of lichen planus without inflammation or scaling  Areas of scarring without hair slowly appear, described as “like footprints in the snow”      Lichen planus pigmentosus  • Lichen planus pigmentosus describes ill-defined oval, greyish brown marks on the face and neck or trunk and limbs without an inflammatory phase  It is a form of acquired dermal macular hyperpigmentation  It can be provoked by sun exposure, but it can also arise in sun-protected sites such as the armpits  It has diffuse, reticulate and diffuse patterns  Lichen planus pigmentosus is similar to erythema dyschromicum perstans and may be the same disease  • Lichen planus pigmentosus may rarely affect the lips, resulting in a patchy dark pigmentation on upper and lower lips  Lichenoid drug eruption  • Lichenoid drug eruption refers to a lichen planus-like rash caused by medications  Asymptomatic or itchy; pink, brown or purple; flat, slightly scaly patches most often arise on the trunk  The oral mucosa (oral lichenoid reaction) and other sites are also sometimes affected  Many drugs can rarely cause lichenoid eruptions  The most common are:  • Gold  • Hydroxychloroquine  • Captopril    What are the complications of lichen planus? Hypertrophic lichen planus may resemble squamous cell carcinoma  However, rarely, longstanding erosive lichen planus can result in true squamous cell carcinoma, most often in the mouth (oral cancer) or on the vulva (vulval cancer) or penis (penile cancer)  This should be suspected if there is an enlarging nodule or an ulcer with thickened edges in these sites  Cancer is more common in smokers, those with a history of cancer in mucosal sites, and in those who carry sexually acquired and oncogenic human papillomavirus  Cancer from other forms of lichen planus is rare  How is lichen planus diagnosed? In most cases, lichen planus is diagnosed by observing its clinical features  A biopsy is often recommended to confirm or make the diagnosis and to look for cancer  The histopathological signs are of a lichenoid tissue reaction affecting the epidermis    Typical features include:  • Irregularly thickened epidermis  • Degenerative skin cells  • Liquefaction degeneration of the basal layer of the epidermis  • Band of inflammatory cells just beneath the epidermis  • Melanin (pigment) beneath the epidermis    Direct immunofluorescent staining may reveal deposits of immunoglobulins at the base of the epidermis  Patch tests may be recommended for patients with oral lichen planus affecting the gums and who have amalgam fillings, to assess for contact allergy to thiomersal (a mercurial compound)  What is the treatment for lichen planus? Treatment is not always necessary  Local treatments for symptomatic cutaneous or mucosal disease are:  • Potent topical steroids  • Topical calcineurin inhibitors, tacrolimus ointment and pimecrolimus cream  • Topical retinoids  • Intralesional steroid injections    Systemic treatment for widespread lichen planus or severe local disease often includes a 1 to 3-month course of oral prednisone, while commencing another agent from the following list:  • Acitretin  • Hydroxychloroquine  • Methotrexate  • Azathioprine  • Mycophenolate mofetil  • Phototherapy    In cases of oral lichen planus affecting the gums with contact allergy to mercury, the lichen planus may resolve on replacing the fillings with composite material  If the lichen planus is not due to mercury allergy, removing amalgam fillings is very unlikely to result in a cure  Anecdotal success is reported from long courses of oral antibiotics and oral antifungal agents  Lichen planopilaris is reported to improve with pioglitazone  What is the outlook for lichen planus? Cutaneous lichen planus tends to clear within a couple of years in most people, but mucosal lichen planus is more likely to persist for a decade or longer  Spontaneous recovery is unpredictable, and lichen planus may recur at a later date  Scarring is permanent, including balding of the scalp        PROCEDURE:  INTRALESIONAL STEROID INJECTION (KENALOG INJECTION)    Purpose: Triamcinolone is a synthetic glucocorticoid corticosteroid that has marked anti-inflammatory action  It is prepared in sterile aqueous suspension suitable for injecting directly into a lesion on or immediately below the skin to treat a dermal inflammatory process  Indications: It is indicated for alopecia areata; inflammatory acne cysts; discoid lupus erythematosus; keloids and hypertrophic scars; inflammatory lesions of granuloma annulare, lichen planus, lichen simplex chronicus (neurodermatitis), psoriatic plaques, and other localized inflammatory skin conditions  Potential Side Effects: I understand that triamcinolone injection can potentially cause early and/or delayed adverse effects such as:   • Pain   • Impaired wound healing   • Increased hair growth   • Bleeding   • White or brown marks   • Steroid acne   • Infection   • Telangiectasia   • Skin thinning   • Cutaneous and subcutaneous lipoatrophy (most common) appearing as skin indentations or dimples around the injection sites a few weeks after treatment     PROCEDURE NOTE:  After verbal and written consent were obtained, the to-be-treated area was wiped and cleaned with rubbing alcohol 70%  Then, a total of 1 9 mL of Kenalog CONCENTRATION:  10 mg/mL   (Lot# [id number]; Expiration apr 2024, NDC#: 8001-2237-32) was injected intralesionally into a total of 3 lesion/s on the following anatomic areas:  Vertex scalp using a 1-mL syringe and a 30-gauge needle  There was less than 1 mL of blood loss and little to no discomfort  The area was bandaged with a Band-aid  The patient tolerated the procedure well and remained in the office for observation  With no signs of an adverse reaction, the patient was eventually discharged from clinic          Scribe Attestation    I,:  Jayjay Mulligan am acting as a scribe while in the presence of the attending physician :       I,:  Jeovanny Kearney MD personally performed the services described in this documentation    as scribed in my presence :         Kacie Almaguer MD  PGY-2 Dermatology

## 2022-12-20 ENCOUNTER — TELEPHONE (OUTPATIENT)
Dept: DERMATOLOGY | Facility: CLINIC | Age: 82
End: 2022-12-20

## 2022-12-20 LAB — BACTERIA UR CULT: NORMAL

## 2022-12-20 NOTE — TELEPHONE ENCOUNTER
Dr Harley Osorio, please reach out to patient and reassure her this is normal and should fade in a few days to a week

## 2022-12-20 NOTE — TELEPHONE ENCOUNTER
Pt is calling to inform you that she is having some redness and flushing from the injection received yesterday  Please advise

## 2022-12-21 ENCOUNTER — PROCEDURE VISIT (OUTPATIENT)
Dept: UROLOGY | Facility: CLINIC | Age: 82
End: 2022-12-21

## 2022-12-21 VITALS — SYSTOLIC BLOOD PRESSURE: 130 MMHG | DIASTOLIC BLOOD PRESSURE: 80 MMHG

## 2022-12-21 DIAGNOSIS — R32 URINARY INCONTINENCE, UNSPECIFIED TYPE: ICD-10-CM

## 2022-12-21 NOTE — PROGRESS NOTES
12/21/2022    Vassie Canal  1940  [id number]    Diagnosis  Chief Complaint    PTNS 10 of 12        Patient presents for PTNS 10 of 12 managed by our office    Plan  Return for next PTNS 1 wk      Vitals:    12/21/22 1028   BP: 130/80   BP Location: Right arm   Patient Position: Sitting         Procedure PTNS    Session 10 of 12    Ankle used: right  Treatment setting:3  Duration of treatment: 30 minutes  Lead lot #:[id number]  Expiration Date:2/17/24  Feeling/Response:yes    Patient states she has developed another UTI over the past week  She is currently taking Macrobid for it  Patient was disappointed and considered this a setback in her progress  Did reassure patient that PTNS will not prevent UTI  It is only to alleviate OAB symptoms  Patient states she gets up about once or twice during the night to urinate        Sheron Saavedra, SYLVIE,BSN

## 2022-12-21 NOTE — TELEPHONE ENCOUNTER
Called patient regarding redness and flushing from steroid injection of scalp  Patient states this has happened in the past  Reassured that this is normal and should fade within the next few days to a week

## 2022-12-30 ENCOUNTER — PROCEDURE VISIT (OUTPATIENT)
Dept: UROLOGY | Facility: CLINIC | Age: 82
End: 2022-12-30

## 2022-12-30 VITALS — SYSTOLIC BLOOD PRESSURE: 118 MMHG | BODY MASS INDEX: 20.55 KG/M2 | HEIGHT: 63 IN | DIASTOLIC BLOOD PRESSURE: 76 MMHG

## 2022-12-30 DIAGNOSIS — R32 URINARY INCONTINENCE, UNSPECIFIED TYPE: ICD-10-CM

## 2022-12-30 DIAGNOSIS — R39.9 UTI SYMPTOMS: Primary | ICD-10-CM

## 2022-12-30 NOTE — PROGRESS NOTES
2022    [facility]  1940  [id number]    Diagnosis  Chief Complaint    OAB (overactive bladder)        Patient presents for PTNS  managed by our office    Plan  Return for last PTNS as scheduled      Vitals:    22 0928   BP: 118/76   Height: 5' 3" (1 6 m)         Procedure PTNS      Session 11 of     Ankle used: left  Treatment settin  Duration of treatment: 30 minutes  Lead lot #:[id number]  Expiration Date:2024  Feeling/Response: insertion site, arch of foot              Donita Ryder LPN

## 2023-01-04 ENCOUNTER — PROCEDURE VISIT (OUTPATIENT)
Dept: UROLOGY | Facility: CLINIC | Age: 83
End: 2023-01-04

## 2023-01-04 VITALS — DIASTOLIC BLOOD PRESSURE: 60 MMHG | SYSTOLIC BLOOD PRESSURE: 106 MMHG

## 2023-01-04 DIAGNOSIS — R32 URINARY INCONTINENCE, UNSPECIFIED TYPE: ICD-10-CM

## 2023-01-04 NOTE — PROGRESS NOTES
2023    Saida López  1940  [id number]    Diagnosis  Chief Complaint    Urinary Incontinence        Patient presents for PTNS  managed by our office    Plan  Appt 23 with Tracee Henson to evaluate after PTNS      Vitals:    23 1327   BP: 106/60   BP Location: Left arm   Patient Position: Sitting         Procedure PTNS    Session     Ankle used: left  Treatment settin  Duration of treatment: 30 minutes  Lead lot #:[id number]  Expiration Date:24  Feeling/Response:yes    Patient reports she is not sure if therapy has been effective as she has good and bad days  She states she cannot go more than 3-4 hours without going to urinate  Did advise her this sounds to be normal   The amount of times you urinate depend on how much fluid intake one consumes and what type of beverage it is  Caffeine causes more urgency and frequency  Patient is aware this is her last PTNS and will be f/u with CAMMY Singh in a month        Irina Rivas RN,BSN

## 2023-01-08 DIAGNOSIS — L66.1 LICHEN PLANOPILARIS: Primary | ICD-10-CM

## 2023-01-09 RX ORDER — CALCIPOTRIENE 0.05 MG/ML
SOLUTION TOPICAL
Qty: 60 ML | Refills: 2 | Status: SHIPPED | OUTPATIENT
Start: 2023-01-09

## 2023-01-10 DIAGNOSIS — L71.9 ROSACEA: Primary | ICD-10-CM

## 2023-01-10 DIAGNOSIS — L66.1 LICHEN PLANOPILARIS: ICD-10-CM

## 2023-01-10 RX ORDER — FOLIC ACID 1 MG/1
TABLET ORAL
Qty: 90 TABLET | Refills: 2 | Status: SHIPPED | OUTPATIENT
Start: 2023-01-10

## 2023-01-10 RX ORDER — OXYMETAZOLINE HYDROCHLORIDE 1 G/100G
CREAM TOPICAL DAILY
Qty: 30 G | Refills: 2 | Status: SHIPPED | OUTPATIENT
Start: 2023-01-10 | End: 2023-04-10

## 2023-01-11 ENCOUNTER — APPOINTMENT (OUTPATIENT)
Dept: LAB | Facility: CLINIC | Age: 83
End: 2023-01-11

## 2023-01-11 DIAGNOSIS — R39.9 UTI SYMPTOMS: ICD-10-CM

## 2023-01-11 LAB
BACTERIA UR QL AUTO: ABNORMAL /HPF
BILIRUB UR QL STRIP: NEGATIVE
CLARITY UR: CLEAR
COLOR UR: ABNORMAL
GLUCOSE UR STRIP-MCNC: NEGATIVE MG/DL
HGB UR QL STRIP.AUTO: NEGATIVE
KETONES UR STRIP-MCNC: NEGATIVE MG/DL
LEUKOCYTE ESTERASE UR QL STRIP: ABNORMAL
NITRITE UR QL STRIP: NEGATIVE
NON-SQ EPI CELLS URNS QL MICRO: ABNORMAL /HPF
PH UR STRIP.AUTO: 6 [PH]
PROT UR STRIP-MCNC: NEGATIVE MG/DL
RBC #/AREA URNS AUTO: ABNORMAL /HPF
SP GR UR STRIP.AUTO: 1.01 (ref 1–1.03)
UROBILINOGEN UR STRIP-ACNC: <2 MG/DL
WBC #/AREA URNS AUTO: ABNORMAL /HPF

## 2023-01-13 ENCOUNTER — TELEPHONE (OUTPATIENT)
Dept: UROLOGY | Facility: CLINIC | Age: 83
End: 2023-01-13

## 2023-01-13 DIAGNOSIS — R39.9 UTI SYMPTOMS: Primary | ICD-10-CM

## 2023-01-13 LAB
BACTERIA UR CULT: ABNORMAL
BACTERIA UR CULT: ABNORMAL

## 2023-01-13 RX ORDER — NITROFURANTOIN 25; 75 MG/1; MG/1
100 CAPSULE ORAL 2 TIMES DAILY
Qty: 6 CAPSULE | Refills: 0 | Status: SHIPPED | OUTPATIENT
Start: 2023-01-13 | End: 2023-01-16

## 2023-01-13 NOTE — TELEPHONE ENCOUNTER
Patient is calling  She is question the antibiotics that she was prescribed  She was prescribed the same ones 2 weeks ago and the infection came back  patient does not think that it will work  Patient reachered that amoxacillin is the one that she should have been prescribed based on her culture results        Patient can be reached at [phone]

## 2023-01-19 ENCOUNTER — PROCEDURE VISIT (OUTPATIENT)
Dept: DERMATOLOGY | Facility: CLINIC | Age: 83
End: 2023-01-19

## 2023-01-19 DIAGNOSIS — L81.6 POIKILODERMA: Primary | ICD-10-CM

## 2023-01-19 NOTE — PROGRESS NOTES
1/19/2023     DO NOT BILL PATIENT OR INSURANCE  PATIENT PAID CASH AT TIME OF VISIT  TREATMENT 2 of 3      [facility] PRIMA (Flashlamp Pulsed-Dye and Long Pulsed ND:Yag Laser) Treatment     PROCEDURE: Flashlamp Pulsed-Dye Laser Treatment  Place of Surgery:  office  Surgeon and : Sagar Beaver   Assistant: Antonio   Photography: yes           After discussing potential procedure related risks including but not limited to pain, purpura, blistering, scarring, discoloration, “footprinting,” recurrence, inefficacy, need for additional treatment, and undesirable cosmetic written and verbal consent were obtained      Anesthetic: none      Safety Precautions:  Fire extinguisher present/Window covered/Staff and patient eyes covered/Warning sign posted     Treatment number: 2  Interim History/Comments:    Percent lightening:   Growth Phase:     Pre-operative Diagnosis: Purpura and poikiloderma  Indications for Surgery:  Cosmesis  Post-operative Diagnosis: same as pre-operative     Parameters:  The V Beam laser was set to 595nm wavelength   The cooling device was set to 30 msec duration/20 msec delay     Procedure Note:  After obtaining appropriate consent, patient was brought back to the operating room   Time out was performed   Patient's name, identification and intended procedure were verified  Eye coverings eye shield inserted/placed       The following anatomic locations were treated at the following PDL laser parameters of        10 mm Spot Size; 6 J Fluence; 10 msec Pulse width:     ANATOMIC LOCATION:  Bilateral Shins; TOTAL AREA (Cm/2) TREATED:  750 cm2        Also treated the left leg with IPL Telangiectasia/Brown Spots; Skin Type 3; Fluence 16; filter 560 nm     Tolerance: Well-tolerated  Complications:   Estimated Blood Loss:  0 cc   Other procedures:          Findings and plans were discussed with the family    Post-op Care: Clobetasol applied in office; vaseline "24/7" to legs  Disposition:  Discharged to home  Follow-up:  4-6 weeks     PATIENT'S AFTER-CARE INSTRUCTIONS:     • Swelling may occur after the laser treatment   This may last for several days   A cool washcloth or ice pack can be applied if it helps him/her feel better      • Bruising or purplish discoloration may be present for up to 2 weeks in the treated area  Keep the area moist with topical Aquaphor or petroleum jelly until the bruising resolves      • Blistering is rare   If this occurs, generously apply bacitracin ointment until complete healing occurs      • Encourage your child to rest and avoid activities that could result in injury to the treated skin      • Your child can take a bath or shower the day after the procedure   Avoid rubbing or scratching the treated area       • Avoid sun exposure after and between laser treatments   Sun exposure may cause pigmentation changes in the treated areas   In addition, sun exposure can darken and worsen red birthmarks      • Tylenol may be given for any post-operative discomfort (pain is usually minimal)    If severe pain occurs, call our office at [phone] (SKIN); after hours or on the weekends, you will be connected to our on-call dermatology service

## 2023-01-24 ENCOUNTER — OFFICE VISIT (OUTPATIENT)
Dept: DERMATOLOGY | Age: 83
End: 2023-01-24

## 2023-01-24 VITALS — TEMPERATURE: 97.7 F | BODY MASS INDEX: 20.55 KG/M2 | HEIGHT: 63 IN

## 2023-01-24 DIAGNOSIS — T78.40XS ALLERGIC REACTION, SEQUELA: ICD-10-CM

## 2023-01-24 DIAGNOSIS — L43.9 LICHEN PLANUS: Primary | ICD-10-CM

## 2023-01-24 RX ORDER — PREDNISONE 20 MG/1
TABLET ORAL
Qty: 25 TABLET | Refills: 0 | Status: SHIPPED | OUTPATIENT
Start: 2023-01-24 | End: 2023-02-13

## 2023-01-24 NOTE — PATIENT INSTRUCTIONS
LICHEN PLANUS    Physical Exam:  Anatomic Location Affected: Anterior vertex of scalp       Assessment and Plan:  Based on a thorough discussion of this condition and the management approach to it (including a comprehensive discussion of the known risks, side effects and potential benefits of treatment), the patient (family) agrees to implement the following specific plan:  Hold injections for today   Continue with minoxidil 2 5 mg by mouth daily  Continue dutasteride 0 5 mg by mouth daily   continue calcipotriene 0 005 %  solution two times a day        What is lichen planus? Lichen planus is a chronic inflammatory skin condition affecting the skin and mucosal surfaces  There are several clinical types of lichen planus that share similar features on histopathology  Cutaneous lichen planus  Mucosal lichen planus  Lichen planopilaris  Lichen planus of the nails  Lichen planus pigmentosus  Lichenoid drug eruption    Who gets lichen planus? Lichen planus affects about one in one hundred people worldwide, mostly affecting adults over the age of 36 years  About half those affected have oral lichen planus, which is more common in women than in men  About 14% have lichen planus of the nails  What causes lichen planus? Lichen planus is a T cell-mediated autoimmune disorder, in which inflammatory cells attack an unknown protein within the skin and mucosal keratinocytes    Contributing factors to lichen planus may include:  Genetic predisposition  Physical and emotional stress  Injury to the skin; lichen planus often appears where the skin has been scratched or after surgery -- this is called the isomorphic response (koebnerisation)  Localized skin disease such as herpes zoster--isotopic response  Systemic viral infection, such as hepatitis C (which might modify self-antigens on the surface of basal keratinocytes)  Contact allergy, such as to metal fillings in oral lichen planus (rare)  Drugs; gold, quinine, quinidine and others can cause a lichenoid rash    A lichenoid inflammation is also notable in [id number] disease, a complication of a bone marrow transplant  What are the clinical features of lichen planus? Lichen planus may cause a small number or many lesions on the skin and mucosal surfaces  Cutaneous lichen planus  The usual presentation of the disease is classical lichen planus  Symptoms can range from none (uncommon) to intense itch  Papules and polygonal plaques are shiny, flat-topped and firm on palpation  The plaques are crossed by fine white lines called Adelfo striae  Hypertrophic lichen planus can be scaly  Bullous lichen planus is rare  Size ranges from pinpoint to larger than a centimeter  Distribution may be scattered, clustered, linear, annular or actinic (sun-exposed sites such as face, neck and backs of the hands)  Location can be anywhere, but most often front of the wrists, lower back, and ankles  Colour depends on the patient's skin type  New papules and plaques often have a purple or violet hue, except on palms and soles where they are yellowish brown  Plaques resolve after some months to leave greyish-brown post-inflammatory macules that can take a year or longer to fade  Oral lichen planus  The mouth is often the only affected area  Oral lichen planus often involves the inside of the cheeks and the sides of the tongue, but the gums and lips may also be involved  The most common patterns are:  Painless white streaks in a lacy or fern-like pattern  Painful and persistent erosions and ulcers (erosive lichen planus)  Diffuse redness and peeling of the gums (desquamative gingivitis)  Localized inflammation of the gums adjacent to amalgam fillings    Vulval lichen planus  Lichen planus may affect labia majora, labia minora and vaginal introitus   Presentation includes:  Painless white streaks in a lacy or fern-like pattern  Painful and persistent erosions and ulcers (erosive lichen planus )  Scarring, resulting in adhesions, resorption of labia minora and introital stenosis  Painful desquamative vaginitis, preventing intercourse and causing a mucky vaginal discharge  The eroded vagina may bleed easily on contact  Overlap with vulval lichen sclerosus, an inflammatory skin disorder that most commonly affects women over 48years of age  Penile lichen planus  Penile lichen planus usually presents with classical papules in a ring around the glans  White streaks and erosive lichen planus may occur but are less common  Other mucosal sites  Erosive lichen planus uncommonly affects the lacrimal glands, eyelids, external ear canal, oesophagus, larynx, bladder and anus  Lichen planopilaris  Lichen planopilaris presents as tiny red spiny follicular papules on the scalp or less often, elsewhere on the body  Rarely, blistering occurs in the lesions  Destruction of the hair follicles leads to permanently bald patches characterized by sparse “lonely hairs”  Frontal fibrosing alopecia is a form of lichen planopilaris that affects the anterior scalp, forehead and eyebrows  Pseudopelade of Brocq is probably a variant of lichen planus without inflammation or scaling  Areas of scarring without hair slowly appear, described as “like footprints in the snow”  Lichen planus pigmentosus  Lichen planus pigmentosus describes ill-defined oval, greyish brown marks on the face and neck or trunk and limbs without an inflammatory phase  It is a form of acquired dermal macular hyperpigmentation  It can be provoked by sun exposure, but it can also arise in sun-protected sites such as the armpits  It has diffuse, reticulate and diffuse patterns  Lichen planus pigmentosus is similar to erythema dyschromicum perstans and may be the same disease  Lichen planus pigmentosus may rarely affect the lips, resulting in a patchy dark pigmentation on upper and lower lips      Lichenoid drug eruption  Lichenoid drug eruption refers to a lichen planus-like rash caused by medications  Asymptomatic or itchy; pink, brown or purple; flat, slightly scaly patches most often arise on the trunk  The oral mucosa (oral lichenoid reaction) and other sites are also sometimes affected  Many drugs can rarely cause lichenoid eruptions  The most common are:  Gold  Hydroxychloroquine  Captopril    What are the complications of lichen planus? Hypertrophic lichen planus may resemble squamous cell carcinoma  However, rarely, longstanding erosive lichen planus can result in true squamous cell carcinoma, most often in the mouth (oral cancer) or on the vulva (vulval cancer) or penis (penile cancer)  This should be suspected if there is an enlarging nodule or an ulcer with thickened edges in these sites  Cancer is more common in smokers, those with a history of cancer in mucosal sites, and in those who carry sexually acquired and oncogenic human papillomavirus  Cancer from other forms of lichen planus is rare  How is lichen planus diagnosed? In most cases, lichen planus is diagnosed by observing its clinical features  A biopsy is often recommended to confirm or make the diagnosis and to look for cancer  The histopathological signs are of a lichenoid tissue reaction affecting the epidermis  Typical features include:  Irregularly thickened epidermis  Degenerative skin cells  Liquefaction degeneration of the basal layer of the epidermis  Band of inflammatory cells just beneath the epidermis  Melanin (pigment) beneath the epidermis    Direct immunofluorescent staining may reveal deposits of immunoglobulins at the base of the epidermis  Patch tests may be recommended for patients with oral lichen planus affecting the gums and who have amalgam fillings, to assess for contact allergy to thiomersal (a mercurial compound)  What is the treatment for lichen planus? Treatment is not always necessary   Local treatments for symptomatic cutaneous or mucosal disease are:  Potent topical steroids  Topical calcineurin inhibitors, tacrolimus ointment and pimecrolimus cream  Topical retinoids  Intralesional steroid injections    Systemic treatment for widespread lichen planus or severe local disease often includes a 1 to 3-month course of oral prednisone, while commencing another agent from the following list:  Acitretin  Hydroxychloroquine  Methotrexate  Azathioprine  Mycophenolate mofetil  Phototherapy    In cases of oral lichen planus affecting the gums with contact allergy to mercury, the lichen planus may resolve on replacing the fillings with composite material  If the lichen planus is not due to mercury allergy, removing amalgam fillings is very unlikely to result in a cure  Anecdotal success is reported from long courses of oral antibiotics and oral antifungal agents  Lichen planopilaris is reported to improve with pioglitazone  What is the outlook for lichen planus? Cutaneous lichen planus tends to clear within a couple of years in most people, but mucosal lichen planus is more likely to persist for a decade or longer  Spontaneous recovery is unpredictable, and lichen planus may recur at a later date  Scarring is permanent, including balding of the scalp  POST COVID REACTION TO JUVIDERM   Physical Exam:  Anatomic Location Affected: Face       Assessment and Plan:  Based on a thorough discussion of this condition and the management approach to it (including a comprehensive discussion of the known risks, side effects and potential benefits of treatment), the patient (family) agrees to implement the following specific plan:  Area tested  today with 0 3 cc of juviderm to left and right corner of lips      Start prednisone take 2 tablets by mouth daily for 5 days , then 1 5 tablets by mouth once a day for 5 dayas , then 1 tablet by mouth once a day for 5 days and the take 0 5 tablets by mouth once a day for 5 days  (AS NEEDED )

## 2023-01-24 NOTE — PROGRESS NOTES
[facility] Follow Up Note    Patient Name: Damian Argueta  Encounter Date: 45 13 [id number]    Today's Chief Concerns:  • Concern #1:  Lichen planus follow up   Current Medications:    Current Outpatient Medications:   •  b complex vitamins capsule, Take 1 capsule by mouth daily, Disp: , Rfl:   •  calcipotriene (DOVONEX) 0 005 % topical solution, APPLY TO AFFECTED AREA(S) TWO TIMES A DAY (Ishmael Orantes), Disp: 60 mL, Rfl: 2  •  cyanocobalamin (VITAMIN B-12) 100 mcg tablet, Take by mouth daily, Disp: , Rfl:   •  cycloSPORINE (RESTASIS) 0 05 % ophthalmic emulsion, 1 drop by Tube route 2 (two) times a day, Disp: , Rfl:   •  dutasteride (AVODART) 0 5 mg capsule, Take 1 capsule (0 5 mg total) by mouth daily, Disp: 90 capsule, Rfl: 2  •  estradiol (ESTRACE) 0 1 mg/g vaginal cream, Insert into the vagina 2 (two) times a week, Disp: , Rfl:   •  folic acid (FOLVITE) 1 mg tablet, TAKE ONE TABLET BY MOUTH EVERY DAY WHILE ON METHOTREXATE TO PREVENT SIDE EFFECTS (GENERIC FOR FOLVITE), Disp: 90 tablet, Rfl: 2  •  Ivermectin (Soolantra) 1 % CREA, Apply topically 1-2 times a day to face area as needed for rosacea , Disp: 45 g, Rfl: 5  •  Lutein 40 MG CAPS, Take by mouth daily  , Disp: , Rfl:   •  Magnesium 500 MG CAPS, Take by mouth, Disp: , Rfl:   •  Magnesium Gluconate 550 MG TABS, Take 30 mg by mouth 2 (two) times a day  , Disp: , Rfl:   •  meloxicam (MOBIC) 15 mg tablet, , Disp: , Rfl:   •  metoprolol succinate (TOPROL-XL) 25 mg 24 hr tablet, Take 25 mg by mouth in the morning , Disp: , Rfl:   •  minoxidil (LONITEN) 2 5 mg tablet, TAKE ONE-HALF TABLET BY MOUTH EVERY DAY -GENERIC FOR LONITEN, Disp: 90 tablet, Rfl: 2  •  Oxymetazoline HCl (Rhofade) 1 % CREA, Apply topically in the morning For facial redness, Disp: 30 g, Rfl: 2  •  tiZANidine (ZANAFLEX) 2 mg tablet, , Disp: , Rfl:     CONSTITUTIONAL:   There were no vitals filed for this visit        Specific Alerts:    Have you been seen by a [facility] in the last 3 years? YES    Are you pregnant or planning to become pregnant? N/A    Are you currently or planning to be nursing or breast feeding? N/A    Allergies   Allergen Reactions   • Dapsone Facial Swelling   • Epinephrine    • Mupirocin    • Seasonal Ic [Cholestatin] Itching   • Cephalosporins Rash       May we call your Preferred Phone number to discuss your specific medical information? YES    May we leave a detailed message that includes your specific medical information? YES    Have you traveled outside of the [facility] in the past 3 months? No    Do you currently have a pacemaker or defibrillator? No    Do you have any artificial heart valves, joints, plates, screws, rods, stents, pins, etc? No   - If Yes, were any placed within the last 2 years? Do you require any medications prior to a surgical procedure? No   - If Yes, for which procedure? - If Yes, what medications to you require? Are you taking any medications that cause you to bleed more easily ("blood thinners") No    Have you ever experienced a rapid heartbeat with epinephrine? No    Have you ever been treated with "gold" (gold sodium thiomalate) therapy? No    [facility] can help with wrinkles, "laugh lines," facial volume loss, "double chin," "love handles," age spots, and more  Are you interested in learning today about some of the skin enhancement procedures that we offer? (If Yes, please provide more detail) No    Review of Systems:  Have you recently had or currently have any of the following?     · Fever or chills: No  · Night Sweats: No  · Headaches: No  · Weight Gain: No  · Weight Loss: No  · Blurry Vision: No  · Nausea: No  · Vomiting: No  · Diarrhea: No  · Blood in Stool: No  · Abdominal Pain: No  · Itchy Skin: No  · Painful Joints: No  · Swollen Joints: No  · Muscle Pain: No  · Irregular Mole: No  · Sun Burn: No  · Dry Skin: No  · Skin Color Changes: No  · Scar or Keloid: No  · Cold Sores/Fever Blisters: No  · Bacterial Infections/MRSA: No  · Anxiety: No  · Depression: No  · Suicidal or Homicidal Thoughts: No      PSYCH: Normal mood and affect  EYES: Normal conjunctiva  ENT: Normal lips and oral mucosa  CARDIOVASCULAR: No edema  RESPIRATORY: Normal respirations  HEME/LYMPH/IMMUNO:  No regional lymphadenopathy except as noted below in ASSESSMENT AND PLAN BY DIAGNOSIS    FULL ORGAN SYSTEM SKIN EXAM (SKIN)   Hair, Scalp, Ears, Face Normal except as noted below in Assessment     LICHEN PLANUS    Physical Exam:  • Anatomic Location Affected: Anterior vertex of scalp   • Morphological Description:  Erythema and perifollicular scale   • Pertinent Positives:  • Pertinent Negatives: Additional History of Present Condition:      Assessment and Plan:  Based on a thorough discussion of this condition and the management approach to it (including a comprehensive discussion of the known risks, side effects and potential benefits of treatment), the patient (family) agrees to implement the following specific plan:  • Hold injections for today   • Continue with minoxidil 2 5 mg by mouth daily  • Continue dutasteride 0 5 mg by mouth daily   • continue calcipotriene 0 005 %  solution two times a day        What is lichen planus? Lichen planus is a chronic inflammatory skin condition affecting the skin and mucosal surfaces  There are several clinical types of lichen planus that share similar features on histopathology  • Cutaneous lichen planus  • Mucosal lichen planus  • Lichen planopilaris  • Lichen planus of the nails  • Lichen planus pigmentosus  • Lichenoid drug eruption    Who gets lichen planus? Lichen planus affects about one in one hundred people worldwide, mostly affecting adults over the age of 36 years  About half those affected have oral lichen planus, which is more common in women than in men  About 31% have lichen planus of the nails  What causes lichen planus?   Lichen planus is a T cell-mediated autoimmune disorder, in which inflammatory cells attack an unknown protein within the skin and mucosal keratinocytes  Contributing factors to lichen planus may include:  • Genetic predisposition  • Physical and emotional stress  • Injury to the skin; lichen planus often appears where the skin has been scratched or after surgery -- this is called the isomorphic response (koebnerisation)  • Localized skin disease such as herpes zoster--isotopic response  • Systemic viral infection, such as hepatitis C (which might modify self-antigens on the surface of basal keratinocytes)  • Contact allergy, such as to metal fillings in oral lichen planus (rare)  • Drugs; gold, quinine, quinidine and others can cause a lichenoid rash    A lichenoid inflammation is also notable in [id number] disease, a complication of a bone marrow transplant  What are the clinical features of lichen planus? Lichen planus may cause a small number or many lesions on the skin and mucosal surfaces  Cutaneous lichen planus  The usual presentation of the disease is classical lichen planus  Symptoms can range from none (uncommon) to intense itch  • Papules and polygonal plaques are shiny, flat-topped and firm on palpation  • The plaques are crossed by fine white lines called Adelfo striae  • Hypertrophic lichen planus can be scaly  • Bullous lichen planus is rare  • Size ranges from pinpoint to larger than a centimeter  • Distribution may be scattered, clustered, linear, annular or actinic (sun-exposed sites such as face, neck and backs of the hands)  • Location can be anywhere, but most often front of the wrists, lower back, and ankles  • Colour depends on the patient's skin type  New papules and plaques often have a purple or violet hue, except on palms and soles where they are yellowish brown  • Plaques resolve after some months to leave greyish-brown post-inflammatory macules that can take a year or longer to fade      Oral lichen planus  The mouth is often the only affected area  Oral lichen planus often involves the inside of the cheeks and the sides of the tongue, but the gums and lips may also be involved  The most common patterns are:  • Painless white streaks in a lacy or fern-like pattern  • Painful and persistent erosions and ulcers (erosive lichen planus)  • Diffuse redness and peeling of the gums (desquamative gingivitis)  • Localized inflammation of the gums adjacent to amalgam fillings    Vulval lichen planus  Lichen planus may affect labia majora, labia minora and vaginal introitus  Presentation includes:  • Painless white streaks in a lacy or fern-like pattern  • Painful and persistent erosions and ulcers (erosive lichen planus )  • Scarring, resulting in adhesions, resorption of labia minora and introital stenosis  • Painful desquamative vaginitis, preventing intercourse and causing a mucky vaginal discharge  The eroded vagina may bleed easily on contact  • Overlap with vulval lichen sclerosus, an inflammatory skin disorder that most commonly affects women over 48years of age  Penile lichen planus  • Penile lichen planus usually presents with classical papules in a ring around the glans  White streaks and erosive lichen planus may occur but are less common  Other mucosal sites  • Erosive lichen planus uncommonly affects the lacrimal glands, eyelids, external ear canal, oesophagus, larynx, bladder and anus  Lichen planopilaris  • Lichen planopilaris presents as tiny red spiny follicular papules on the scalp or less often, elsewhere on the body  Rarely, blistering occurs in the lesions  Destruction of the hair follicles leads to permanently bald patches characterized by sparse “lonely hairs”  • Frontal fibrosing alopecia is a form of lichen planopilaris that affects the anterior scalp, forehead and eyebrows  • Pseudopelade of Brocq is probably a variant of lichen planus without inflammation or scaling   Areas of scarring without hair slowly appear, described as “like footprints in the snow”  Lichen planus pigmentosus  • Lichen planus pigmentosus describes ill-defined oval, greyish brown marks on the face and neck or trunk and limbs without an inflammatory phase  It is a form of acquired dermal macular hyperpigmentation  It can be provoked by sun exposure, but it can also arise in sun-protected sites such as the armpits  It has diffuse, reticulate and diffuse patterns  Lichen planus pigmentosus is similar to erythema dyschromicum perstans and may be the same disease  • Lichen planus pigmentosus may rarely affect the lips, resulting in a patchy dark pigmentation on upper and lower lips  Lichenoid drug eruption  • Lichenoid drug eruption refers to a lichen planus-like rash caused by medications  Asymptomatic or itchy; pink, brown or purple; flat, slightly scaly patches most often arise on the trunk  The oral mucosa (oral lichenoid reaction) and other sites are also sometimes affected  Many drugs can rarely cause lichenoid eruptions  The most common are:  • Gold  • Hydroxychloroquine  • Captopril    What are the complications of lichen planus? Hypertrophic lichen planus may resemble squamous cell carcinoma  However, rarely, longstanding erosive lichen planus can result in true squamous cell carcinoma, most often in the mouth (oral cancer) or on the vulva (vulval cancer) or penis (penile cancer)  This should be suspected if there is an enlarging nodule or an ulcer with thickened edges in these sites  Cancer is more common in smokers, those with a history of cancer in mucosal sites, and in those who carry sexually acquired and oncogenic human papillomavirus  Cancer from other forms of lichen planus is rare  How is lichen planus diagnosed? In most cases, lichen planus is diagnosed by observing its clinical features  A biopsy is often recommended to confirm or make the diagnosis and to look for cancer   The histopathological signs are of a lichenoid tissue reaction affecting the epidermis  Typical features include:  • Irregularly thickened epidermis  • Degenerative skin cells  • Liquefaction degeneration of the basal layer of the epidermis  • Band of inflammatory cells just beneath the epidermis  • Melanin (pigment) beneath the epidermis    Direct immunofluorescent staining may reveal deposits of immunoglobulins at the base of the epidermis  Patch tests may be recommended for patients with oral lichen planus affecting the gums and who have amalgam fillings, to assess for contact allergy to thiomersal (a mercurial compound)  What is the treatment for lichen planus? Treatment is not always necessary  Local treatments for symptomatic cutaneous or mucosal disease are:  • Potent topical steroids  • Topical calcineurin inhibitors, tacrolimus ointment and pimecrolimus cream  • Topical retinoids  • Intralesional steroid injections    Systemic treatment for widespread lichen planus or severe local disease often includes a 1 to 3-month course of oral prednisone, while commencing another agent from the following list:  • Acitretin  • Hydroxychloroquine  • Methotrexate  • Azathioprine  • Mycophenolate mofetil  • Phototherapy    In cases of oral lichen planus affecting the gums with contact allergy to mercury, the lichen planus may resolve on replacing the fillings with composite material  If the lichen planus is not due to mercury allergy, removing amalgam fillings is very unlikely to result in a cure  Anecdotal success is reported from long courses of oral antibiotics and oral antifungal agents  Lichen planopilaris is reported to improve with pioglitazone  What is the outlook for lichen planus? Cutaneous lichen planus tends to clear within a couple of years in most people, but mucosal lichen planus is more likely to persist for a decade or longer   Spontaneous recovery is unpredictable, and lichen planus may recur at a later date  Scarring is permanent, including balding of the scalp  POST COVID REACTION TO JUVIDERM / ALLERGIC REACTION   Physical Exam:  • Anatomic Location Affected: Face   • Morphological Description:  Currently clear   • Pertinent Positives:  • Pertinent Negatives: Additional History of Present Condition:  erytheme and swelling after receiving juviderm after receiving covid vaccinations     Assessment and Plan:  Based on a thorough discussion of this condition and the management approach to it (including a comprehensive discussion of the known risks, side effects and potential benefits of treatment), the patient (family) agrees to implement the following specific plan:  • Area tested  today with 0 3 cc of juviderm to left and right corner of lips      • Start prednisone take 2 tablets by mouth daily for 5 days , then 1 5 tablets by mouth once a day for 5 dayas , then 1 tablet by mouth once a day for 5 days and the take 0 5 tablets by mouth once a day for 5 days  (AS NEEDED )  Scribe Attestation    I,:  Paula Lozada am acting as a scribe while in the presence of the attending physician :       I,:  Calos Hernandez MD personally performed the services described in this documentation    as scribed in my presence :

## 2023-02-06 ENCOUNTER — OFFICE VISIT (OUTPATIENT)
Dept: UROLOGY | Facility: CLINIC | Age: 83
End: 2023-02-06

## 2023-02-06 ENCOUNTER — TELEPHONE (OUTPATIENT)
Dept: UROLOGY | Facility: CLINIC | Age: 83
End: 2023-02-06

## 2023-02-06 VITALS
SYSTOLIC BLOOD PRESSURE: 110 MMHG | HEIGHT: 63 IN | WEIGHT: 119 LBS | BODY MASS INDEX: 21.09 KG/M2 | DIASTOLIC BLOOD PRESSURE: 70 MMHG

## 2023-02-06 DIAGNOSIS — N39.0 URINARY TRACT INFECTION WITHOUT HEMATURIA, SITE UNSPECIFIED: Primary | ICD-10-CM

## 2023-02-06 RX ORDER — CLOBETASOL PROPIONATE 0.5 MG/G
OINTMENT TOPICAL
COMMUNITY
Start: 2022-12-30

## 2023-02-06 NOTE — PROGRESS NOTES
UROLOGY PROGRESS NOTE   Patient Identifiers: Ahsan Kelly (MRN [id number])  Date of Service: 2/6/2023    Subjective:   79-year-old female history of urinary frequency and incontinence  Sling procedure in [place] in 2012 followed by Botox  She did undergo PTNS which she has recently completed  She did have a recent UTI  She would like to continue her maintenance PTNS monthly  Reason for visit: Voiding dysfunction follow-up    Objective:     VITALS:    There were no vitals filed for this visit          LABS:  Lab Results   Component Value Date    HGB 12 2 04/20/2021    HCT 36 6 04/20/2021    WBC 8 0 04/20/2021     04/20/2021   ]    Lab Results   Component Value Date     05/15/2017    K 4 5 05/15/2017     05/15/2017    CO2 28 05/15/2017    BUN 17 05/15/2017    CREATININE 0 72 05/15/2017    CALCIUM 9 0 05/15/2017   ]        INPATIENT MEDS:    Current Outpatient Medications:   •  b complex vitamins capsule, Take 1 capsule by mouth daily, Disp: , Rfl:   •  calcipotriene (DOVONEX) 0 005 % topical solution, APPLY TO AFFECTED AREA(S) TWO TIMES A DAY (GENERIC DOVONEX), Disp: 60 mL, Rfl: 2  •  cyanocobalamin (VITAMIN B-12) 100 mcg tablet, Take by mouth daily, Disp: , Rfl:   •  cycloSPORINE (RESTASIS) 0 05 % ophthalmic emulsion, 1 drop by Tube route 2 (two) times a day, Disp: , Rfl:   •  dutasteride (AVODART) 0 5 mg capsule, Take 1 capsule (0 5 mg total) by mouth daily, Disp: 90 capsule, Rfl: 2  •  estradiol (ESTRACE) 0 1 mg/g vaginal cream, Insert into the vagina 2 (two) times a week, Disp: , Rfl:   •  folic acid (FOLVITE) 1 mg tablet, TAKE ONE TABLET BY MOUTH EVERY DAY WHILE ON METHOTREXATE TO PREVENT SIDE EFFECTS (GENERIC FOR FOLVITE), Disp: 90 tablet, Rfl: 2  •  Ivermectin (Soolantra) 1 % CREA, Apply topically 1-2 times a day to face area as needed for rosacea , Disp: 45 g, Rfl: 5  •  Lutein 40 MG CAPS, Take by mouth daily  , Disp: , Rfl:   •  Magnesium 500 MG CAPS, Take by mouth, Disp: , Rfl:   • Magnesium Gluconate 550 MG TABS, Take 30 mg by mouth 2 (two) times a day  , Disp: , Rfl:   •  meloxicam (MOBIC) 15 mg tablet, , Disp: , Rfl:   •  metoprolol succinate (TOPROL-XL) 25 mg 24 hr tablet, Take 25 mg by mouth in the morning , Disp: , Rfl:   •  minoxidil (LONITEN) 2 5 mg tablet, TAKE ONE-HALF TABLET BY MOUTH EVERY DAY -GENERIC FOR LONITEN, Disp: 90 tablet, Rfl: 2  •  Oxymetazoline HCl (Rhofade) 1 % CREA, Apply topically in the morning For facial redness, Disp: 30 g, Rfl: 2  •  predniSONE 20 mg tablet, Take 2 tablets (40 mg total) by mouth daily for 5 days, THEN 1 5 tablets (30 mg total) daily for 5 days, THEN 1 tablet (20 mg total) daily for 5 days, THEN 0 5 tablets (10 mg total) daily for 5 days  , Disp: 25 tablet, Rfl: 0  •  tiZANidine (ZANAFLEX) 2 mg tablet, , Disp: , Rfl:       Physical Exam:   There were no vitals taken for this visit  GEN: no acute distress    RESP: breathing comfortably with no accessory muscle use    ABD: soft, non-tender, non-distended   INCISION:    EXT: no significant peripheral edema       RADIOLOGY:   None    Assessment:   #1   Voiding dysfunction  2    Recurrent urinary tract infection    Plan:   -I did provide her with a urine culture slip and a collection container upon hand  -Follow-up for her monthly PTNS maintenance  -And with a provider in 6 months  -

## 2023-02-06 NOTE — TELEPHONE ENCOUNTER
Patient needs PTNS appointments  Patient prefers the [facility]  Provider note:    Return for monthly PTNS maintenence treatments( urine culture slip to have on hand)

## 2023-02-13 NOTE — TELEPHONE ENCOUNTER
Patient called stating she would like to schedule her PTNS appointment at [place]       Patient can be reached at [phone]

## 2023-02-13 NOTE — TELEPHONE ENCOUNTER
Called pt to advise that we are still waiting for her Auth from insurance  [employer] has a waiting for a one-two months  Pt wanted to know the time frame at the [facility] office  I advised I would send her encounter over to find out  Also reminded her that we still need her Auth so the time frame also depends on that  Pt verbally agree and understood plan

## 2023-02-27 ENCOUNTER — OFFICE VISIT (OUTPATIENT)
Dept: OBGYN CLINIC | Facility: CLINIC | Age: 83
End: 2023-02-27

## 2023-02-27 VITALS
WEIGHT: 117 LBS | HEIGHT: 62 IN | SYSTOLIC BLOOD PRESSURE: 122 MMHG | DIASTOLIC BLOOD PRESSURE: 68 MMHG | BODY MASS INDEX: 21.53 KG/M2

## 2023-02-27 DIAGNOSIS — Z12.31 ENCOUNTER FOR SCREENING MAMMOGRAM FOR MALIGNANT NEOPLASM OF BREAST: ICD-10-CM

## 2023-02-27 DIAGNOSIS — N39.41 URGE INCONTINENCE OF URINE: ICD-10-CM

## 2023-02-27 DIAGNOSIS — L43.9 LICHEN PLANUS: ICD-10-CM

## 2023-02-27 DIAGNOSIS — Z01.419 ENCOUNTER FOR GYNECOLOGICAL EXAMINATION (GENERAL) (ROUTINE) WITHOUT ABNORMAL FINDINGS: Primary | ICD-10-CM

## 2023-02-27 RX ORDER — APIXABAN 2.5 MG/1
TABLET, FILM COATED ORAL
COMMUNITY
Start: 2023-02-15

## 2023-02-27 NOTE — PROGRESS NOTES
ASSESSMENT & PLAN:         The following were reviewed in today's visit: Current ASCCP Guidelines, breast self exam, mammography screening ordered, menopause, exercise and healthy diet  Discussed vaginal atrophy  Discussed theoretical risk with blood clots using estrogen  Discussed low level of estrogen is absorbed systemically with vaginal use  Advised patient to trial otc vaginal moisturizer  Questions answered  Patient to return to office yearly for annual exam    All questions have been answered to her satisfaction  CC:  Annual Gynecologic Examination    HPI: Robbie Vasquez is a 82 y o   who presents for annual gynecologic examination  She has the following concerns:  Issues with urinary incontinence  States she leaks a small amount of urine after she completes her void  She is being seen by urology and undergoing PTNS  She reports having had 2 sling procedures and botox  Denies any vaginal bleeding or discharge  She has been using vaginal estrogen intermittently for atrophy  Recently dx with Afib and started on Eliquis  Health Maintenance:    She exercises most days per week  She wears her seatbelt routinely  She does perform regular monthly self breast exams  She feels safe at home  Patients does follow a healthy diet  Last mammogram:   Last colonoscopy:     Past Medical History:   Diagnosis Date   • A-fib [facility])    • Lichen planus     scalp and vaginal area   • Purpura [facility])        Past Surgical History:   Procedure Laterality Date   • CATARACT EXTRACTION Right 2020   • TOTAL ABDOMINAL HYSTERECTOMY N/A        Past OB/Gyn History:  OB History        2    Para   2    Term                AB        Living   2       SAB        IAB        Ectopic        Multiple        Live Births                   Menstrual history: Patient is post menopausal    History of abnormal pap smears:  No per patient    Patient is not currently sexually active         Family History   Problem Relation Age of Onset   • Cancer Mother    • Heart attack Father    • Heart disease Sister    • Prostate cancer Brother    • Heart disease Brother    • Atrial fibrillation Brother        Social History:  Social History     Socioeconomic History   • Marital status: /Civil Union     Spouse name: Not on file   • Number of children: Not on file   • Years of education: Not on file   • Highest education level: Not on file   Occupational History   • Not on file   Tobacco Use   • Smoking status: Never   • Smokeless tobacco: Never   Vaping Use   • Vaping Use: Never used   Substance and Sexual Activity   • Alcohol use: Not Currently     Comment: anders    • Drug use: Never   • Sexual activity: Not Currently     Birth control/protection: Post-menopausal   Other Topics Concern   • Not on file   Social History Narrative   • Not on file     Social Determinants of Health     Financial Resource Strain: Not on file   Food Insecurity: Not on file   Transportation Needs: Not on file   Physical Activity: Not on file   Stress: Not on file   Social Connections: Not on file   Intimate Partner Violence: Not on file   Housing Stability: Not on file     Presently lives with spouse  Patient is     Patient is currently retired     Allergies   Allergen Reactions   • Albuterol Other (See Comments)     Atrial fibrillation   • Dapsone Facial Swelling   • Epinephrine    • Mupirocin    • Seasonal Ic [Cholestatin] Itching   • Cephalosporins Rash         Current Outpatient Medications:   •  b complex vitamins capsule, Take 1 capsule by mouth daily, Disp: , Rfl:   •  calcipotriene (DOVONEX) 0 005 % topical solution, APPLY TO AFFECTED AREA(S) TWO TIMES A DAY (GENERIC DOVONEX), Disp: 60 mL, Rfl: 2  •  Cholecalciferol (VITAMIN D3 PO), Take by mouth, Disp: , Rfl:   •  clobetasol (TEMOVATE) 0 05 % ointment, , Disp: , Rfl:   •  cyanocobalamin (VITAMIN B-12) 100 mcg tablet, Take by mouth daily, Disp: , Rfl:   •  cycloSPORINE (RESTASIS) 0 05 % ophthalmic emulsion, 1 drop by Tube route 2 (two) times a day, Disp: , Rfl:   •  dutasteride (AVODART) 0 5 mg capsule, Take 1 capsule (0 5 mg total) by mouth daily, Disp: 90 capsule, Rfl: 2  •  Eliquis 2 5 MG, , Disp: , Rfl:   •  estradiol (ESTRACE) 0 1 mg/g vaginal cream, Insert into the vagina 2 (two) times a week, Disp: , Rfl:   •  folic acid (FOLVITE) 1 mg tablet, TAKE ONE TABLET BY MOUTH EVERY DAY WHILE ON METHOTREXATE TO PREVENT SIDE EFFECTS (GENERIC FOR FOLVITE), Disp: 90 tablet, Rfl: 2  •  Ivermectin (Soolantra) 1 % CREA, Apply topically 1-2 times a day to face area as needed for rosacea , Disp: 45 g, Rfl: 5  •  Lutein 40 MG CAPS, Take by mouth daily  , Disp: , Rfl:   •  Magnesium 500 MG CAPS, Take by mouth, Disp: , Rfl:   •  Magnesium Gluconate 550 MG TABS, Take 30 mg by mouth 2 (two) times a day  , Disp: , Rfl:   •  meloxicam (MOBIC) 15 mg tablet, , Disp: , Rfl:   •  metoprolol succinate (TOPROL-XL) 25 mg 24 hr tablet, Take 25 mg by mouth in the morning , Disp: , Rfl:   •  minoxidil (LONITEN) 2 5 mg tablet, TAKE ONE-HALF TABLET BY MOUTH EVERY DAY -GENERIC FOR LONITEN, Disp: 90 tablet, Rfl: 2  •  Oxymetazoline HCl (Rhofade) 1 % CREA, Apply topically in the morning For facial redness, Disp: 30 g, Rfl: 2    Review of Systems:  Review of Systems   Constitutional: Negative for activity change, chills, fever and unexpected weight change  HENT: Negative for congestion, ear pain, hearing loss and sore throat  Respiratory: Negative for cough, chest tightness and shortness of breath  Cardiovascular: Negative for chest pain and leg swelling  Gastrointestinal: Negative for abdominal pain, constipation, diarrhea, nausea and vomiting  Genitourinary: Negative for difficulty urinating, dysuria, frequency, menstrual problem, pelvic pain, vaginal discharge and vaginal pain  Skin: Negative for color change and rash  Neurological: Negative for dizziness, numbness and headaches  Psychiatric/Behavioral: Negative for agitation and confusion  Physical Exam:  /68 (BP Location: Left arm, Patient Position: Sitting, Cuff Size: Standard)   Ht 5' 2" (1 575 m)   Wt 53 1 kg (117 lb)   LMP  (LMP Unknown)   BMI 21 40 kg/m²    Physical Exam  Constitutional:       General: She is not in acute distress  Appearance: Normal appearance  She is normal weight  Genitourinary:      Bladder and urethral meatus normal       No lesions in the vagina  Right Labia: skin changes  Left Labia: skin changes  Labial fusion present  Vulva exam comments: Fusion of clitoral maher  Erythema of b/l labia majora  Erythema of inner labia minora   No vaginal discharge or bleeding  No vaginal prolapse present  Severe vaginal atrophy present  Right Adnexa: not palpable  Left Adnexa: not palpable  Cervix is absent  Uterus is absent  Breasts:     Breasts are symmetrical       Right: No inverted nipple, mass or nipple discharge  Left: No inverted nipple, mass or nipple discharge  HENT:      Head: Normocephalic and atraumatic  Nose: Nose normal    Eyes:      Conjunctiva/sclera: Conjunctivae normal       Pupils: Pupils are equal, round, and reactive to light  Cardiovascular:      Rate and Rhythm: Normal rate  Pulses: Normal pulses  Pulmonary:      Effort: Pulmonary effort is normal    Abdominal:      General: Abdomen is flat  Palpations: Abdomen is soft  Comments: Pfannenstiel incision scar     Musculoskeletal:         General: Normal range of motion  Cervical back: Normal range of motion  Neurological:      General: No focal deficit present  Mental Status: She is alert and oriented to person, place, and time  Mental status is at baseline  Skin:     General: Skin is warm and dry  Psychiatric:         Mood and Affect: Mood normal          Behavior: Behavior normal          Thought Content:  Thought content normal          Judgment: Judgment normal    Vitals and nursing note reviewed

## 2023-03-06 ENCOUNTER — TELEPHONE (OUTPATIENT)
Dept: OBGYN CLINIC | Facility: CLINIC | Age: 83
End: 2023-03-06

## 2023-03-06 DIAGNOSIS — R30.0 DYSURIA: Primary | ICD-10-CM

## 2023-03-06 NOTE — TELEPHONE ENCOUNTER
Patient called  She stated she was seen last week   She believes she has a UTI and would like you to send a script for cultures to [facility] in [place], [place]

## 2023-03-07 ENCOUNTER — TELEPHONE (OUTPATIENT)
Dept: OBGYN CLINIC | Facility: CLINIC | Age: 83
End: 2023-03-07

## 2023-03-07 DIAGNOSIS — N30.00 ACUTE CYSTITIS WITHOUT HEMATURIA: Primary | ICD-10-CM

## 2023-03-07 RX ORDER — NITROFURANTOIN 25; 75 MG/1; MG/1
100 CAPSULE ORAL 2 TIMES DAILY
Qty: 14 CAPSULE | Refills: 0 | Status: SHIPPED | OUTPATIENT
Start: 2023-03-07 | End: 2023-03-14

## 2023-03-07 NOTE — TELEPHONE ENCOUNTER
Spoke to patient on the phone, notified her that Kimber Gonsalez was sent to the pharmacy  She is to take it twice a day for 7 days  She is aware that the culture is still in process

## 2023-03-07 NOTE — TELEPHONE ENCOUNTER
----- Message from Loan Mackay MD sent at 3/7/2023  3:10 PM EST -----  The culture is still pending, but I am going to treat her for a UTI based on the urinanalysis    Sent Rx for PO macrobid to pharmacy      ----- Message -----  From: Kj Palacios RN  Sent: 3/7/2023   2:22 PM EST  To: Loan Mackay MD      ----- Message -----  From: Camila Gallagher Incoming  Sent: 3/7/2023   2:15 PM EST  To: [id number]

## 2023-03-09 ENCOUNTER — COSMETIC (OUTPATIENT)
Dept: DERMATOLOGY | Facility: CLINIC | Age: 83
End: 2023-03-09

## 2023-03-09 DIAGNOSIS — L81.6 POIKILODERMA: Primary | ICD-10-CM

## 2023-03-09 LAB
APPEARANCE UR: CLEAR
BACTERIA UR CULT: ABNORMAL
BACTERIA URNS QL MICRO: ABNORMAL
BILIRUB UR QL STRIP: POSITIVE
CASTS URNS QL MICRO: ABNORMAL /LPF
COLOR UR: YELLOW
EPI CELLS #/AREA URNS HPF: ABNORMAL /HPF (ref 0–10)
GLUCOSE UR QL: NEGATIVE
HGB UR QL STRIP: NEGATIVE
KETONES UR QL STRIP: NEGATIVE
LEUKOCYTE ESTERASE UR QL STRIP: ABNORMAL
Lab: ABNORMAL
MICRO URNS: ABNORMAL
NITRITE UR QL STRIP: POSITIVE
PH UR STRIP: 6.5 [PH] (ref 5–7.5)
PROT UR QL STRIP: ABNORMAL
RBC #/AREA URNS HPF: ABNORMAL /HPF (ref 0–2)
SL AMB ANTIMICROBIAL SUSCEPTIBILITY: ABNORMAL
SP GR UR: 1.01 (ref 1–1.03)
UROBILINOGEN UR STRIP-ACNC: 1 MG/DL (ref 0.2–1)
WBC #/AREA URNS HPF: ABNORMAL /HPF (ref 0–5)

## 2023-03-09 NOTE — PROGRESS NOTES
DO NOT BILL PATIENT OR INSURANCE   PATIENT PAID CASH AT TIME OF VISIT  TREATMENT 3 of 3      3/9/2023    Mariama PRIMA (Flashlamp Pulsed-Dye and Long Pulsed ND:Yag Laser) Treatment     PROCEDURE: Flashlamp Pulsed-Dye Laser Treatment  Place of Surgery:  office  Surgeon and : Dr Casas   Assistant: Corbin Brown  Photography: yes           After discussing potential procedure related risks including but not limited to pain, purpura, blistering, scarring, discoloration, “footprinting,” recurrence, inefficacy, need for additional treatment, and undesirable cosmetic written and verbal consent were obtained      Anesthetic: none      Safety Precautions:  Fire extinguisher present/Window covered/Staff and patient eyes covered/Warning sign posted     Treatment number: 3  Interim History/Comments:    Percent lightening:   Growth Phase:     Pre-operative Diagnosis: Purpura and poikiloderma  Indications for Surgery:  Cosmesis  Post-operative Diagnosis: same as pre-operative     Parameters:  The V Beam laser was set to 595nm wavelength   The cooling device was set to 30 msec duration/20 msec delay     Procedure Note:  After obtaining appropriate consent, patient was brought back to the operating room   Time out was performed   Patient's name, identification and intended procedure were verified  Eye coverings eye shield inserted/placed       The following anatomic locations were treated at the following PDL laser parameters of        10 mm Spot Size; 6 J Fluence; 10 msec Pulse width:     ANATOMIC LOCATION:  Bilateral Shins; TOTAL AREA (Cm/2) TREATED:  750 cm2        Also treated the left leg with IPL Telangiectasia/Brown Spots; Skin Type 3; Fluence 18; filter 590nm     Tolerance: Well-tolerated  Complications:   Estimated Blood Loss:  0 cc   Other procedures:          Findings and plans were discussed with the family    Post-op Care: Clobetasol applied in office; vaseline "24/7" to legs  Disposition:  Discharged to home  Follow-up:  4-6 weeks     PATIENT'S AFTER-CARE INSTRUCTIONS:     • Swelling may occur after the laser treatment   This may last for several days   A cool washcloth or ice pack can be applied if it helps him/her feel better      • Bruising or purplish discoloration may be present for up to 2 weeks in the treated area  Keep the area moist with topical Aquaphor or petroleum jelly until the bruising resolves      • Blistering is rare   If this occurs, generously apply bacitracin ointment until complete healing occurs      • Encourage your child to rest and avoid activities that could result in injury to the treated skin      • Your child can take a bath or shower the day after the procedure   Avoid rubbing or scratching the treated area       • Avoid sun exposure after and between laser treatments   Sun exposure may cause pigmentation changes in the treated areas   In addition, sun exposure can darken and worsen red birthmarks      • Tylenol may be given for any post-operative discomfort (pain is usually minimal)    If severe pain occurs, call our office at [phone] (SKIN); after hours or on the weekends, you will be connected to our on-call dermatology service      Scribe Attestation    I,:  Antonio Rocha am acting as a scribe while in the presence of the attending physician :       I,:  Abraham Espana MD personally performed the services described in this documentation    as scribed in my presence :

## 2023-03-29 ENCOUNTER — COSMETIC (OUTPATIENT)
Dept: DERMATOLOGY | Age: 83
End: 2023-03-29

## 2023-03-29 ENCOUNTER — PROCEDURE VISIT (OUTPATIENT)
Dept: DERMATOLOGY | Age: 83
End: 2023-03-29

## 2023-03-29 VITALS — BODY MASS INDEX: 21.71 KG/M2 | HEIGHT: 62 IN | TEMPERATURE: 98.3 F | WEIGHT: 118 LBS

## 2023-03-29 DIAGNOSIS — L82.1 SEBORRHEIC KERATOSIS: Primary | ICD-10-CM

## 2023-03-29 DIAGNOSIS — L43.9 LICHEN PLANUS: ICD-10-CM

## 2023-03-29 DIAGNOSIS — L71.9 ROSACEA: Primary | ICD-10-CM

## 2023-03-29 RX ORDER — DOXYCYCLINE HYCLATE 20 MG
20 TABLET ORAL 2 TIMES DAILY
Qty: 60 TABLET | Refills: 1 | Status: SHIPPED | OUTPATIENT
Start: 2023-03-29 | End: 2023-04-28

## 2023-03-29 NOTE — PATIENT INSTRUCTIONS
LICHEN PLANUS    Assessment and Plan:  Based on a thorough discussion of this condition and the management approach to it (including a comprehensive discussion of the known risks, side effects and potential benefits of treatment), the patient (family) agrees to implement the following specific plan:  Kenalog injection done in office today   Continue minoxidil 2 5 mg by mouth daily   Continue Dutasteride 0 5 mg by mouth daily   Continue Calcipotriene 0 005% solution 2 times a day       ROSACEA    Assessment and Plan:  Based on a thorough discussion of this condition and the management approach to it (including a comprehensive discussion of the known risks, side effects and potential benefits of treatment), the patient (family) agrees to implement the following specific plan:  Continue Rhofade twice daily as directed for redness on cheeks   Start Doxycycline 20 mg twice a day by mouth for 1-2 months     Rosacea is a chronic rash affecting the mid-face including the nose, cheeks, chin, forehead, and eyelids  The incidence is usually greatest between the ages of 30-60 years and is more common in people with fair skin  Common characteristics include redness, telangiectasias, papules and pustules over affected areas  Rosacea may look similar to acne, but there is a lack of comedones  Occasionally the eyes may also be involved in ocular rosacea  In advanced disease, enlargement of the sebaceous glands in the nose, termed rhinophyma, may be present

## 2023-03-29 NOTE — PROGRESS NOTES
"[facility] Note     Patient Name: Dalia Wyatt  Encounter Date: 03/29/2023    Have you been cared for by a [facility] in the last 3 years and, if so, which description applies to you? Yes  I have been here within the last 3 years, and my medical history has NOT changed since that time  I am FEMALE/of child-bearing potential     REVIEW OF SYSTEMS:  Have you recently had or currently have any of the following? · No changes in my recent health  PAST MEDICAL HISTORY:  Have you personally ever had or currently have any of the following? If \"YES,\" then please provide more detail  · No changes in my medical history  FAMILY HISTORY:  Any \"first degree relatives\" (parent, brother, sister, or child) with the following? • No changes in my family's known health  PATIENT EXPERIENCE:    • Do you want the Dermatologist to perform a COMPLETE skin exam today including a clinical examination under the \"bra and underwear\" areas? NO  • If necessary, do we have your permission to call and leave a detailed message on your Preferred Phone number that includes your specific medical information?   Yes      Allergies   Allergen Reactions   • Albuterol Other (See Comments)     Atrial fibrillation   • Dapsone Facial Swelling   • Epinephrine    • Mupirocin    • Seasonal Ic [Cholestatin] Itching   • Cephalosporins Rash      Current Outpatient Medications:   •  b complex vitamins capsule, Take 1 capsule by mouth daily, Disp: , Rfl:   •  calcipotriene (DOVONEX) 0 005 % topical solution, APPLY TO AFFECTED AREA(S) TWO TIMES A DAY (GENERIC DOVONEX), Disp: 60 mL, Rfl: 2  •  Cholecalciferol (VITAMIN D3 PO), Take by mouth, Disp: , Rfl:   •  clobetasol (TEMOVATE) 0 05 % ointment, , Disp: , Rfl:   •  cyanocobalamin (VITAMIN B-12) 100 mcg tablet, Take by mouth daily, Disp: , Rfl:   •  cycloSPORINE (RESTASIS) 0 05 % ophthalmic emulsion, 1 drop by Tube route 2 (two) times a day, Disp: , Rfl:   •  dutasteride " (AVODART) 0 5 mg capsule, Take 1 capsule (0 5 mg total) by mouth daily, Disp: 90 capsule, Rfl: 2  •  estradiol (ESTRACE) 0 1 mg/g vaginal cream, Insert into the vagina 2 (two) times a week, Disp: , Rfl:   •  folic acid (FOLVITE) 1 mg tablet, TAKE ONE TABLET BY MOUTH EVERY DAY WHILE ON METHOTREXATE TO PREVENT SIDE EFFECTS (Prabhjot Aziza 177), Disp: 90 tablet, Rfl: 2  •  Ivermectin (Soolantra) 1 % CREA, Apply topically 1-2 times a day to face area as needed for rosacea , Disp: 45 g, Rfl: 5  •  Lutein 40 MG CAPS, Take by mouth daily  , Disp: , Rfl:   •  Magnesium 500 MG CAPS, Take by mouth, Disp: , Rfl:   •  Magnesium Gluconate 550 MG TABS, Take 30 mg by mouth 2 (two) times a day  , Disp: , Rfl:   •  meloxicam (MOBIC) 15 mg tablet, , Disp: , Rfl:   •  metoprolol succinate (TOPROL-XL) 25 mg 24 hr tablet, Take 25 mg by mouth in the morning , Disp: , Rfl:   •  minoxidil (LONITEN) 2 5 mg tablet, TAKE ONE-HALF TABLET BY MOUTH EVERY DAY -GENERIC FOR LONITEN, Disp: 90 tablet, Rfl: 2  •  Oxymetazoline HCl (Rhofade) 1 % CREA, Apply topically in the morning For facial redness, Disp: 30 g, Rfl: 2  •  Eliquis 2 5 MG, , Disp: , Rfl:           • Whom besides the patient is providing clinical information about today's encounter?   o NO ADDITIONAL HISTORIAN (patient alone provided history)    Physical Exam and Assessment/Plan by Diagnosis:    ROSACEA    Physical Exam:  • Anatomic Location Affected:  Cheeks   • Morphological Description:  Erythema and telangiectasia  • Pertinent Positives:  • Pertinent Negatives: Additional History of Present Condition:  Applying rhofade which seems to help  Would like refill  Currently having a flare    Usually flares when she has injections for hairloss too    Assessment and Plan:  Based on a thorough discussion of this condition and the management approach to it (including a comprehensive discussion of the known risks, side effects and potential benefits of treatment), the patient (family) agrees to implement the following specific plan:  • Continue Rhofade cream twice daily as directed for redness on cheeks   • Start Doxycycline 20 mg twice a day by mouth for 1-2 months    Rosacea is a chronic rash affecting the mid-face including the nose, cheeks, chin, forehead, and eyelids  The incidence is usually greatest between the ages of 30-60 years and is more common in people with fair skin  Common characteristics include redness, telangiectasias, papules and pustules over affected areas  Rosacea may look similar to acne, but there is a lack of comedones  Occasionally the eyes may also be involved in ocular rosacea  In advanced disease, enlargement of the sebaceous glands in the nose, termed rhinophyma, may be present  Rosacea results in red spots (papules) and sometimes pustules over the face, but unlike acne there are no blackheads, whiteheads, or cystic nodules  Patients often experience increased facial flushing with prominent blood vessels (erythematotelangiectatic rosacea) and dry, sensitive skin  These symptoms are exacerbated by sun exposure, hot or spicy foods, topical steroids and oil-based facial products  In ocular rosacea, eyelids may be red and sore due to conjunctivitis, keratitis, and episcleritis  If rhinophyma develops due to enlargement of sebaceous glands, the patient may have an enlarged and irregularly shaped nose with prominent pores  In rosacea that is refractory to treatment, patients can develop persistent redness and swelling of the face due to lymphatic obstruction (Morbihan disease)  Distribution around the cheeks may be confused with the malar or “butterfly rash” of lupus  However, the rash of lupus spares the nasal creases and lacks papules and pustules   If signs of photosensitivity, oral ulcers, arthritis, and kidney dysfunction are present then consider referral to a rheumatologist      There are many potential causes of rosacea including genetic, environmental, vascular, and inflammatory factors  These include, but are not limited to:  • Chronic exposure to ultraviolet radiation   • Increased immune responses in the form of cathelicidins that promote vessel dilation and infiltration with white blood cells (neutrophils) into the dermis  • Increased matrix metalloproteinases such as collagen and elastase that remodel normal tissue may contribute to inflammation of the skin making it thicker and harder  • There is some evidence to suggest that increased numbers of demodex mites on patient skin may contribute to rosacea papules     General Treatment Approach   • Avoid exacerbating factors such as heat, spicy foods, and alcohol   • Use daily SPF30+ sunscreen and other methods of coverage for sun protection  • Use water-based make-up   • Avoid applying topical steroids to affected areas as they can cause perioral dermatitis and exacerbate rosacea     Topical Treatment Approach  • Metronidazole cream or gel by itself or in combination with oral antibiotics for more severe cases  • Azelaic acid cream or lotion is effective for mild inflammatory rosacea when applied twice daily to affected areas  • Brimonidine gel and oxymetazoline hydrochloride cream can reduce facial redness temporarily   • Ivermectin cream can treat papulopustular rosacea by controlling demodex mites and inflammation   • Pimecrolimus cream or tacrolimus ointment twice a day for 2-3 months can help reduce inflammation    Oral Treatment Approach  • Antibiotics such as doxycycline, minocycline, or erythromycin for 1-3 months  • Clonidine and carvedilol can help reduce facial flushing and are generally well tolerated  Common side effects include low blood pressure, gastrointestinal upset, dry eyes, blurred vision and low heart rate  • Isotretinoin at low doses can be effective for long term treatment when antibiotics fail  Side effects may make it unsuitable for some patients     • NSAIDs such as diclofenac can help reduce discomfort and redness in the skin  Procedural/Surgical Treatment Approach   • Vascular lasers or intense pulsed light treatment may be used to treat persistent telangiectasia and papulopustular rosacea  • Plastic surgery and carbon dioxide lasers may be used to treat rhinophyma       LICHEN PLANUS    Physical Exam:  • Anatomic Location Affected: Anterior vertex scalp   • Morphological Description:  Erythema and scale   • Pertinent Positives:  • Pertinent Negatives: Additional History of Present Condition:  Previous visit advised to continue minoxidil, dutasteride and calcipotriene solution  She states she currently have a flare     Assessment and Plan:  Based on a thorough discussion of this condition and the management approach to it (including a comprehensive discussion of the known risks, side effects and potential benefits of treatment), the patient (family) agrees to implement the following specific plan:  • Kenalog injection done in office today   • Continue minoxidil 2 5 mg by mouth daily   • Continue Dutasteride 0 5 mg by mouth daily   • Continue Calcipotriene 0 005% solution 2 times a day     What is lichen planus? Lichen planus is a chronic inflammatory skin condition affecting the skin and mucosal surfaces  There are several clinical types of lichen planus that share similar features on histopathology  • Cutaneous lichen planus  • Mucosal lichen planus  • Lichen planopilaris  • Lichen planus of the nails  • Lichen planus pigmentosus  • Lichenoid drug eruption    Who gets lichen planus? Lichen planus affects about one in one hundred people worldwide, mostly affecting adults over the age of 36 years  About half those affected have oral lichen planus, which is more common in women than in men  About 15% have lichen planus of the nails  What causes lichen planus?   Lichen planus is a T cell-mediated autoimmune disorder, in which inflammatory cells attack an unknown protein within the skin and mucosal keratinocytes  Contributing factors to lichen planus may include:  • Genetic predisposition  • Physical and emotional stress  • Injury to the skin; lichen planus often appears where the skin has been scratched or after surgery -- this is called the isomorphic response (koebnerisation)  • Localized skin disease such as herpes zoster--isotopic response  • Systemic viral infection, such as hepatitis C (which might modify self-antigens on the surface of basal keratinocytes)  • Contact allergy, such as to metal fillings in oral lichen planus (rare)  • Drugs; gold, quinine, quinidine and others can cause a lichenoid rash    A lichenoid inflammation is also notable in [id number] disease, a complication of a bone marrow transplant  What are the clinical features of lichen planus? Lichen planus may cause a small number or many lesions on the skin and mucosal surfaces  Cutaneous lichen planus  The usual presentation of the disease is classical lichen planus  Symptoms can range from none (uncommon) to intense itch  • Papules and polygonal plaques are shiny, flat-topped and firm on palpation  • The plaques are crossed by fine white lines called Adelfo striae  • Hypertrophic lichen planus can be scaly  • Bullous lichen planus is rare  • Size ranges from pinpoint to larger than a centimeter  • Distribution may be scattered, clustered, linear, annular or actinic (sun-exposed sites such as face, neck and backs of the hands)  • Location can be anywhere, but most often front of the wrists, lower back, and ankles  • Colour depends on the patient's skin type  New papules and plaques often have a purple or violet hue, except on palms and soles where they are yellowish brown  • Plaques resolve after some months to leave greyish-brown post-inflammatory macules that can take a year or longer to fade  Oral lichen planus  The mouth is often the only affected area   Oral lichen planus often involves the inside of the cheeks and the sides of the tongue, but the gums and lips may also be involved  The most common patterns are:  • Painless white streaks in a lacy or fern-like pattern  • Painful and persistent erosions and ulcers (erosive lichen planus)  • Diffuse redness and peeling of the gums (desquamative gingivitis)  • Localized inflammation of the gums adjacent to amalgam fillings    Vulval lichen planus  Lichen planus may affect labia majora, labia minora and vaginal introitus  Presentation includes:  • Painless white streaks in a lacy or fern-like pattern  • Painful and persistent erosions and ulcers (erosive lichen planus )  • Scarring, resulting in adhesions, resorption of labia minora and introital stenosis  • Painful desquamative vaginitis, preventing intercourse and causing a mucky vaginal discharge  The eroded vagina may bleed easily on contact  • Overlap with vulval lichen sclerosus, an inflammatory skin disorder that most commonly affects women over 48years of age  Penile lichen planus  • Penile lichen planus usually presents with classical papules in a ring around the glans  White streaks and erosive lichen planus may occur but are less common  Other mucosal sites  • Erosive lichen planus uncommonly affects the lacrimal glands, eyelids, external ear canal, oesophagus, larynx, bladder and anus  Lichen planopilaris  • Lichen planopilaris presents as tiny red spiny follicular papules on the scalp or less often, elsewhere on the body  Rarely, blistering occurs in the lesions  Destruction of the hair follicles leads to permanently bald patches characterized by sparse “lonely hairs”  • Frontal fibrosing alopecia is a form of lichen planopilaris that affects the anterior scalp, forehead and eyebrows  • Pseudopelade of Brocq is probably a variant of lichen planus without inflammation or scaling  Areas of scarring without hair slowly appear, described as “like footprints in the snow”      Lichen planus pigmentosus  • Lichen planus pigmentosus describes ill-defined oval, greyish brown marks on the face and neck or trunk and limbs without an inflammatory phase  It is a form of acquired dermal macular hyperpigmentation  It can be provoked by sun exposure, but it can also arise in sun-protected sites such as the armpits  It has diffuse, reticulate and diffuse patterns  Lichen planus pigmentosus is similar to erythema dyschromicum perstans and may be the same disease  • Lichen planus pigmentosus may rarely affect the lips, resulting in a patchy dark pigmentation on upper and lower lips  Lichenoid drug eruption  • Lichenoid drug eruption refers to a lichen planus-like rash caused by medications  Asymptomatic or itchy; pink, brown or purple; flat, slightly scaly patches most often arise on the trunk  The oral mucosa (oral lichenoid reaction) and other sites are also sometimes affected  Many drugs can rarely cause lichenoid eruptions  The most common are:  • Gold  • Hydroxychloroquine  • Captopril    What are the complications of lichen planus? Hypertrophic lichen planus may resemble squamous cell carcinoma  However, rarely, longstanding erosive lichen planus can result in true squamous cell carcinoma, most often in the mouth (oral cancer) or on the vulva (vulval cancer) or penis (penile cancer)  This should be suspected if there is an enlarging nodule or an ulcer with thickened edges in these sites  Cancer is more common in smokers, those with a history of cancer in mucosal sites, and in those who carry sexually acquired and oncogenic human papillomavirus  Cancer from other forms of lichen planus is rare  How is lichen planus diagnosed? In most cases, lichen planus is diagnosed by observing its clinical features  A biopsy is often recommended to confirm or make the diagnosis and to look for cancer  The histopathological signs are of a lichenoid tissue reaction affecting the epidermis    Typical features include:  • Irregularly thickened epidermis  • Degenerative skin cells  • Liquefaction degeneration of the basal layer of the epidermis  • Band of inflammatory cells just beneath the epidermis  • Melanin (pigment) beneath the epidermis    Direct immunofluorescent staining may reveal deposits of immunoglobulins at the base of the epidermis  Patch tests may be recommended for patients with oral lichen planus affecting the gums and who have amalgam fillings, to assess for contact allergy to thiomersal (a mercurial compound)  What is the treatment for lichen planus? Treatment is not always necessary  Local treatments for symptomatic cutaneous or mucosal disease are:  • Potent topical steroids  • Topical calcineurin inhibitors, tacrolimus ointment and pimecrolimus cream  • Topical retinoids  • Intralesional steroid injections    Systemic treatment for widespread lichen planus or severe local disease often includes a 1 to 3-month course of oral prednisone, while commencing another agent from the following list:  • Acitretin  • Hydroxychloroquine  • Methotrexate  • Azathioprine  • Mycophenolate mofetil  • Phototherapy    In cases of oral lichen planus affecting the gums with contact allergy to mercury, the lichen planus may resolve on replacing the fillings with composite material  If the lichen planus is not due to mercury allergy, removing amalgam fillings is very unlikely to result in a cure  Anecdotal success is reported from long courses of oral antibiotics and oral antifungal agents  Lichen planopilaris is reported to improve with pioglitazone  What is the outlook for lichen planus? Cutaneous lichen planus tends to clear within a couple of years in most people, but mucosal lichen planus is more likely to persist for a decade or longer  Spontaneous recovery is unpredictable, and lichen planus may recur at a later date  Scarring is permanent, including balding of the scalp      PROCEDURE:  INTRALESIONAL STEROID INJECTION (KENALOG INJECTION)    Purpose: Triamcinolone is a synthetic glucocorticoid corticosteroid that has marked anti-inflammatory action  It is prepared in sterile aqueous suspension suitable for injecting directly into a lesion on or immediately below the skin to treat a dermal inflammatory process  Indications: It is indicated for alopecia areata; inflammatory acne cysts; discoid lupus erythematosus; keloids and hypertrophic scars; inflammatory lesions of granuloma annulare, lichen planus, lichen simplex chronicus (neurodermatitis), psoriatic plaques, and other localized inflammatory skin conditions  Potential Side Effects: I understand that triamcinolone injection can potentially cause early and/or delayed adverse effects such as:   • Pain   • Impaired wound healing   • Increased hair growth   • Bleeding   • White or brown marks   • Steroid acne   • Infection   • Telangiectasia   • Skin thinning   • Cutaneous and subcutaneous lipoatrophy (most common) appearing as skin indentations or dimples around the injection sites a few weeks after treatment     PROCEDURE NOTE:  After verbal and written consent were obtained, the to-be-treated area was wiped and cleaned with rubbing alcohol 70%  Then, a total of 1 mL of Kenalog CONCENTRATION:  10 mg/mL   (Lot# [id number]; Expiration 08/31/2024, NDC#: 1364-1553-72) was injected intralesionally into a total of 1 lesion/s on the following anatomic areas:  scalp using a 1-mL syringe and a 30-gauge needle  There was less than 1 mL of blood loss and little to no discomfort  The area was bandaged with a Band-aid  The patient tolerated the procedure well and remained in the office for observation  With no signs of an adverse reaction, the patient was eventually discharged from clinic        Scribe Attestation    I,:  Gabriela Schulte am acting as a scribe while in the presence of the attending physician :       I,:  Lashell Galo MD personally performed the services described in this documentation    as scribed in my presence :

## 2023-03-29 NOTE — PROGRESS NOTES
COSMETIC PROCEDURE:  DESTRUCTION OF BENIGN LESIONS WITH CRYOTHERAPY  After a thorough discussion of treatment options and risk/benefits/alternatives (including but not limited to local pain, scarring, dyspigmentation, blistering, and possible superinfection), verbal and written consent were obtained and the aforementioned lesions were treated on with cryotherapy using liquid nitrogen x 1 cycle for 5-10 seconds  TOTAL NUMBER of 3 lesions were treated today on the ANATOMIC LOCATION: arms  The patient tolerated the procedure well, and after-care instructions were provided  DO NOT BILL INSURANCE   PATIENT PAID   Scribe Attestation    I,:  Gabriela Schulte am acting as a scribe while in the presence of the attending physician :       I,:  Lashell Galo MD personally performed the services described in this documentation    as scribed in my presence :

## 2023-04-03 DIAGNOSIS — L71.9 ROSACEA: ICD-10-CM

## 2023-04-03 NOTE — TELEPHONE ENCOUNTER
Patient was seen last week for her rosacea and was told this medication would be renewed  Please review below before signing  Rhofade 1% cream    Thank you

## 2023-04-04 RX ORDER — OXYMETAZOLINE HYDROCHLORIDE 1 G/100G
CREAM TOPICAL DAILY
Qty: 30 G | Refills: 2 | Status: SHIPPED | OUTPATIENT
Start: 2023-04-04 | End: 2023-07-03

## 2023-04-26 ENCOUNTER — COSMETIC (OUTPATIENT)
Dept: DERMATOLOGY | Age: 83
End: 2023-04-26

## 2023-04-26 VITALS — TEMPERATURE: 97.2 F

## 2023-04-26 DIAGNOSIS — Z41.1 ENCOUNTER FOR COSMETIC PROCEDURE: Primary | ICD-10-CM

## 2023-04-26 NOTE — PATIENT INSTRUCTIONS
Within First Hour after dermal filler treatment:   Immediately after the treatment, there may be redness, bruising, swelling, tenderness, and/or itching near the injection site  This is normal and generally disappears within a few hours to a few days  In our office, we will provide you with ice and suggest icing for about 5-10 min after the treatment  Within Six Hours of the Treatment:   Avoid drinking alcohol or strenuous exercise, which may result in additional bruising  Until the swelling and redness have resolved, do not expose the area to intense heat, such a sunbathing, tanning, saunas, hot tubs, or hot wax  Also avoid extreme cold, such as skiing or hiking outdoors  Ice Packs can be applied periodically to reduce swelling  You may also take pain medications, such as acetaminophen (Tylenol) or Ibuprofen (Advil, Motrin, etc )  However, taking Ibuprofen or aspirin may exacerbate bruising, as they have blood thinning properties  To help alleviate bruising, you can try topical application of Arnica, a natural ointment commonly used to reduce bruising  You can find Arnica in the natural foods section of your grocery store, or at local pharmacies  Minimize movement of the treated area  However, if there is a visible bump, you can massage the area  The product will soften into the skin naturally within a few days  Sunscreen and makeup can be applied, and the area can be gently washed with a gentle cleanser  Remember that dermal fillers are long lasting but not permanent  You always have a choice to continue or change the combination of treatments - and the procedure can be repeated as often as you like  Hyaluronic acid fillers last anywhere from 6-9 months, depending on the area into which they were injected and your own body's metabolism  Radiesse filler can last 12-18 months

## 2023-04-26 NOTE — PROGRESS NOTES
"[place] [address] [facility] Note     Patient Name: Justin Donnelly  Encounter Date: 04/26/2023     Have you been cared for by a [place] [address] Dermatologist in the last 3 years and, if so, which description applies to you? Yes  I have been here within the last 3 years, and my medical history has NOT changed since that time  I am FEMALE/of child-bearing potential     REVIEW OF SYSTEMS:  Have you recently had or currently have any of the following? · No changes in my recent health  PAST MEDICAL HISTORY:  Have you personally ever had or currently have any of the following? If \"YES,\" then please provide more detail  · No changes in my medical history  FAMILY HISTORY:  Any \"first degree relatives\" (parent, brother, sister, or child) with the following? • No changes in my family's known health  PATIENT EXPERIENCE:    • Do you want the Dermatologist to perform a COMPLETE skin exam today including a clinical examination under the \"bra and underwear\" areas? NO  • If necessary, do we have your permission to call and leave a detailed message on your Preferred Phone number that includes your specific medical information?   Yes      Allergies   Allergen Reactions   • Albuterol Other (See Comments)     Atrial fibrillation   • Dapsone Facial Swelling   • Epinephrine    • Mupirocin    • Seasonal Ic [Cholestatin] Itching   • Cephalosporins Rash      Current Outpatient Medications:   •  b complex vitamins capsule, Take 1 capsule by mouth daily, Disp: , Rfl:   •  calcipotriene (DOVONEX) 0 005 % topical solution, APPLY TO AFFECTED AREA(S) TWO TIMES A DAY (GENERIC DOVONEX), Disp: 60 mL, Rfl: 2  •  Cholecalciferol (VITAMIN D3 PO), Take by mouth, Disp: , Rfl:   •  clobetasol (TEMOVATE) 0 05 % ointment, , Disp: , Rfl:   •  cyanocobalamin (VITAMIN B-12) 100 mcg tablet, Take by mouth daily, Disp: , Rfl:   •  cycloSPORINE (RESTASIS) 0 05 % ophthalmic emulsion, 1 drop by Tube route 2 (two) times a day, Disp: , Rfl:   •  doxycycline " (PERIOSTAT) 20 MG tablet, Take 1 tablet (20 mg total) by mouth 2 (two) times a day, Disp: 60 tablet, Rfl: 1  •  dutasteride (AVODART) 0 5 mg capsule, Take 1 capsule (0 5 mg total) by mouth daily, Disp: 90 capsule, Rfl: 2  •  Eliquis 2 5 MG, , Disp: , Rfl:   •  estradiol (ESTRACE) 0 1 mg/g vaginal cream, Insert into the vagina 2 (two) times a week (Patient not taking: Reported on 4/17/2023), Disp: , Rfl:   •  folic acid (FOLVITE) 1 mg tablet, TAKE ONE TABLET BY MOUTH EVERY DAY WHILE ON METHOTREXATE TO PREVENT SIDE EFFECTS (Prabhjot Ervin 177), Disp: 90 tablet, Rfl: 2  •  Ivermectin (Soolantra) 1 % CREA, Apply topically 1-2 times a day to face area as needed for rosacea , Disp: 45 g, Rfl: 5  •  Lutein 40 MG CAPS, Take by mouth daily  , Disp: , Rfl:   •  Magnesium 500 MG CAPS, Take by mouth, Disp: , Rfl:   •  Magnesium Gluconate 550 MG TABS, Take 30 mg by mouth 2 (two) times a day  , Disp: , Rfl:   •  meloxicam (MOBIC) 15 mg tablet, , Disp: , Rfl:   •  metoprolol succinate (TOPROL-XL) 25 mg 24 hr tablet, Take 25 mg by mouth in the morning , Disp: , Rfl:   •  minoxidil (LONITEN) 2 5 mg tablet, TAKE ONE-HALF TABLET BY MOUTH EVERY DAY -GENERIC FOR LONITEN, Disp: 90 tablet, Rfl: 2  •  Oxymetazoline HCl (Rhofade) 1 % CREA, Apply topically in the morning For facial redness, Disp: 30 g, Rfl: 2          • Whom besides the patient is providing clinical information about today's encounter?   o NO ADDITIONAL HISTORIAN (patient alone provided history)    Physical Exam and Assessment/Plan by Diagnosis:      Diagnosis: volume loss, rhytides  Location: right and left nasolabial folds   Informed consent: Discussed risks include but are not limited to: Pain, bleeding, swelling, allergic reaction, necrosis, infection, granuloma formation, potential blindness, undesirable cosmetic result, need for additional treatments  Patient is aware that this is an elective procedure  Written informed consent was obtained    Preparation: The area was cleansed with alcohol  Topical anesthetic used for 30 minutes  Procedure Details: The soft tissue filler was injected into the SQ with the appropriate needle  Injections with were done slowly with a close evaluation of the anatomy and the vasculature  Pressure applied to any bleeding  Ice packs offered for swelling  Product: Patel Bitter   Lot Number: [id number]  Expiration: 2023-09-07    Volume injected: 1 ML    Plan: Patient was instructed to apply ice at home to minimize swelling  Pt is aware that the product lasts for 6-12 months  It will soften the appearance of the volume loss or rhytides but they will not completely resolve  Written aftercare instructions provided  Patient instructed to not use make up for 24-48 hours, avoid strenuous exercise for 24 hours  Call for any problems  THIS IS A COSMETIC PATIENT WHO PAID OUT OF POCKET AT TIME OF VISIT  DO NOT BILL  AFTER CARE INSTRUCTIONS     Within First Hour after dermal filler treatment:  • Immediately after the treatment, there may be redness, bruising, swelling, tenderness, and/or itching near the injection site  This is normal and generally disappears within a few hours to a few days  • In our office, we will provide you with ice and suggest icing for about 5-10 min after the treatment  Within Six Hours of the Treatment:  • Avoid drinking alcohol or strenuous exercise, which may result in additional bruising  Until the swelling and redness have resolved, do not expose the area to intense heat, such a sunbathing, tanning, saunas, hot tubs, or hot wax  Also avoid extreme cold, such as skiing or hiking outdoors  • Ice Packs can be applied periodically to reduce swelling  You may also take pain medications, such as acetaminophen (Tylenol) or Ibuprofen (Advil, Motrin, etc )  However, taking Ibuprofen or aspirin may exacerbate bruising, as they have blood thinning properties    • To help alleviate bruising, you can try topical application of Arnica, a natural ointment commonly used to reduce bruising  You can find Arnica in the natural foods section of your grocery store, or at local pharmacies  • Minimize movement of the treated area  However, if there is a visible bump, you can massage the area  The product will soften into the skin naturally within a few days  • Sunscreen and makeup can be applied, and the area can be gently washed with a gentle cleanser  • Remember that dermal fillers are long lasting but not permanent  You always have a choice to continue or change the combination of treatments - and the procedure can be repeated as often as you like  • Hyaluronic acid fillers last anywhere from 6-9 months, depending on the area into which they were injected and your own body's metabolism  Radiesse filler can last 12-18 months       Scribe Attestation    I,:  Liudmila Monroy am acting as a scribe while in the presence of the attending physician :       I,:  Shelly Pham MD personally performed the services described in this documentation    as scribed in my presence :

## 2023-05-05 ENCOUNTER — TELEPHONE (OUTPATIENT)
Dept: DERMATOLOGY | Facility: CLINIC | Age: 83
End: 2023-05-05

## 2023-05-05 NOTE — TELEPHONE ENCOUNTER
Please advise as pt was seen for JUVEDERM VOLLURE   Injections  However, at her last apt it was charged to insurance but office note stated pt billed as cosmetic  Review and advise as the vollure injections are $725 for 1st syringe and $625 for 2nd syringe  Pt has upcoming apt w/Dr Shoemaker and payments are due at time of service  Thank you! Product: Vanessa Mukherjee   Lot Number: [id number]  Expiration: 2023-09-07    Volume injected: 1 ML    THIS IS A COSMETIC PATIENT WHO PAID OUT OF POCKET AT TIME OF VISIT  DO NOT BILL      --- however, pt only pd copay with you on 4/26 and billed $625

## 2023-05-08 ENCOUNTER — COSMETIC (OUTPATIENT)
Dept: DERMATOLOGY | Facility: CLINIC | Age: 83
End: 2023-05-08

## 2023-05-08 DIAGNOSIS — Z41.1 ENCOUNTER FOR COSMETIC PROCEDURE: Primary | ICD-10-CM

## 2023-05-08 NOTE — TELEPHONE ENCOUNTER
This was a resident performed procedure    She should not be billed because it was for training purposes

## 2023-05-08 NOTE — PROGRESS NOTES
"          [place] [address] [facility] Note     Patient Name: Gloria Vega  Encounter Date: 5/8/23     Have you been cared for by a [place] [address] Dermatologist in the last 3 years and, if so, which description applies to you? Yes  I have been here within the last 3 years, and my medical history has NOT changed since that time  I am FEMALE/of child-bearing potential     REVIEW OF SYSTEMS:  Have you recently had or currently have any of the following? · No changes in my recent health  PAST MEDICAL HISTORY:  Have you personally ever had or currently have any of the following? If \"YES,\" then please provide more detail  · No changes in my medical history  FAMILY HISTORY:  Any \"first degree relatives\" (parent, brother, sister, or child) with the following? • No changes in my family's known health  PATIENT EXPERIENCE:    • Do you want the Dermatologist to perform a COMPLETE skin exam today including a clinical examination under the \"bra and underwear\" areas? NO  • If necessary, do we have your permission to call and leave a detailed message on your Preferred Phone number that includes your specific medical information?   Yes      Allergies   Allergen Reactions   • Albuterol Other (See Comments)     Atrial fibrillation   • Dapsone Facial Swelling   • Epinephrine    • Mupirocin    • Seasonal Ic [Cholestatin] Itching   • Cephalosporins Rash      Current Outpatient Medications:   •  b complex vitamins capsule, Take 1 capsule by mouth daily, Disp: , Rfl:   •  calcipotriene (DOVONEX) 0 005 % topical solution, APPLY TO AFFECTED AREA(S) TWO TIMES A DAY (GENERIC DOVONEX), Disp: 60 mL, Rfl: 2  •  Cholecalciferol (VITAMIN D3 PO), Take by mouth, Disp: , Rfl:   •  clobetasol (TEMOVATE) 0 05 % ointment, , Disp: , Rfl:   •  cyanocobalamin (VITAMIN B-12) 100 mcg tablet, Take by mouth daily, Disp: , Rfl:   •  cycloSPORINE (RESTASIS) 0 05 % ophthalmic emulsion, 1 drop by Tube route 2 (two) times a day, Disp: , Rfl:   •  " dutasteride (AVODART) 0 5 mg capsule, Take 1 capsule (0 5 mg total) by mouth daily, Disp: 90 capsule, Rfl: 2  •  Eliquis 2 5 MG, , Disp: , Rfl:   •  estradiol (ESTRACE) 0 1 mg/g vaginal cream, Insert into the vagina 2 (two) times a week (Patient not taking: Reported on 4/17/2023), Disp: , Rfl:   •  folic acid (FOLVITE) 1 mg tablet, TAKE ONE TABLET BY MOUTH EVERY DAY WHILE ON METHOTREXATE TO PREVENT SIDE EFFECTS (GENERIC FOR FOLVITE), Disp: 90 tablet, Rfl: 2  •  Ivermectin (Soolantra) 1 % CREA, Apply topically 1-2 times a day to face area as needed for rosacea , Disp: 45 g, Rfl: 5  •  Lutein 40 MG CAPS, Take by mouth daily  , Disp: , Rfl:   •  Magnesium 500 MG CAPS, Take by mouth, Disp: , Rfl:   •  Magnesium Gluconate 550 MG TABS, Take 30 mg by mouth 2 (two) times a day  , Disp: , Rfl:   •  meloxicam (MOBIC) 15 mg tablet, , Disp: , Rfl:   •  metoprolol succinate (TOPROL-XL) 25 mg 24 hr tablet, Take 25 mg by mouth in the morning , Disp: , Rfl:   •  minoxidil (LONITEN) 2 5 mg tablet, TAKE ONE-HALF TABLET BY MOUTH EVERY DAY -GENERIC FOR LONITEN, Disp: 90 tablet, Rfl: 2  •  Oxymetazoline HCl (Rhofade) 1 % CREA, Apply topically in the morning For facial redness, Disp: 30 g, Rfl: 2          • Whom besides the patient is providing clinical information about today's encounter?   o NO ADDITIONAL HISTORIAN (patient alone provided history)    Physical Exam and Assessment/Plan by Diagnosis:    FILLER PROCEDURE NOTE     Product Michael Márquez  Lot  [id number]  EXP 12/22/2023    Diagnosis: rhytides, jowls    Location: b/l marionettes, lips, and b/l oral commissures    Informed consent: Discussed risks (infection, pain, bleeding, bruising, swelling, allergic reaction including anaphylaxis, lumps and bumps, vascular occlusion causing tissue death, loss of vision, undesirable cosmetic result, need for additional treatment, and death) and benefits of the procedure, as well as the alternatives  Informed consent was obtained  Preparation: The area was cleansed with hibiclens  Procedure Details:  Using the provided sterile 30G needle, filler was deposited in the subcutaneous tissue in above areas using linear threads and depots  Pressure applied to any bleeding  No blanching visible at time of injection  For map of areas injected, see scanned face sheet  Plan: Tylenol may be used for headache  Call for any problems  Written instructions provided       Tolerance: well-tolerated; no issues     Complications: Bruising at entry points in lip and left oral commissure     Dermal Filler aftercare sheet given      Patient paid $625 00 for Juvederm Ultra 1 mL syringe     DO NOT BILL INSURANCE    Scribe Attestation    I,:  Ezio Muro MA am acting as a scribe while in the presence of the attending physician :       I,:  Starla Greenwood MD personally performed the services described in this documentation    as scribed in my presence :

## 2023-05-09 ENCOUNTER — PROCEDURE VISIT (OUTPATIENT)
Dept: DERMATOLOGY | Facility: CLINIC | Age: 83
End: 2023-05-09

## 2023-05-09 DIAGNOSIS — Z86.19 H/O COLD SORES: Primary | ICD-10-CM

## 2023-05-09 RX ORDER — VALACYCLOVIR HYDROCHLORIDE 1 G/1
1000 TABLET, FILM COATED ORAL 2 TIMES DAILY
Qty: 6 TABLET | Refills: 0 | Status: SHIPPED | OUTPATIENT
Start: 2023-05-09 | End: 2023-05-12

## 2023-05-09 NOTE — PROGRESS NOTES
Post-filler PDL     PDL Laser Treatment    5/9/2023  Device: OnGreen  Place of Treatment: [place]: Yane Dye MD  Assistant: Josy Lambert    Treatment number: 1  Site of treatment: lower jaw    Pre-operative Diagnosis:   Indications for Surgery:    Post-operative Diagnosis: same as pre-operative    Anesthetic: none     Safety Precautions:  Fire extinguisher persent/Window covered/Staff and patient eyes covered/Warning sign posted     Interim History/Comments:  Pt had filler yesterday with visible bruising, here to treat bruise  Percent Improvement: N/A    Parameters:   Fluence: 6 J/cm2  Pulse duration: 6 msec  Spot size: 10 mm    Procedure Note:   After discussing potential procedure related risks including but not limited to pain, purpura, blistering, scarring, discoloration, “footprinting,” recurrence, inefficacy, need for additional treatment, and undesirable cosmetic results, written and verbal consent were obtained  Patient was brought back to the operating room  Time out was performed  Patient's name, identification and intended procedure were verified  Prior to the procedure, the patient cleansed the treatment area  The treatment area was re-cleansed with alcohol  Eye coverings eye shield inserted/placed  Tolerance: Patient tolerated the procedure well with no immediate significant complications and left in stable condition  Complications: none  Other procedures: none  Photography: no    Post-op Care: Aftercare was discussed  Continue to ice at home and keep the area moist with a gentle moisturizer  Advised the patient to avoid exercise for the next 24 hours and also to be cautious with sun exposure over the next week  Advised patient to call the office if there is excessive swelling  Follow-up:  Patient is aware that it will take multiple treatments to treat this condition   Return to clinic for re-treatment if needed      THIS WAS A NO CHARGE TREATMENT TO TREAT BRUSING AFTER A PAID COSMETIC PROCEDURE  DO NOT BILL INSURANCE OR PATIENT

## 2023-05-09 NOTE — TELEPHONE ENCOUNTER
Billing has rectified pt billing for 4/26/23 date of service to no charge as it was resident training

## 2023-05-15 ENCOUNTER — PROCEDURE VISIT (OUTPATIENT)
Dept: UROLOGY | Facility: AMBULATORY SURGERY CENTER | Age: 83
End: 2023-05-15

## 2023-05-15 VITALS
WEIGHT: 119.8 LBS | SYSTOLIC BLOOD PRESSURE: 118 MMHG | DIASTOLIC BLOOD PRESSURE: 64 MMHG | BODY MASS INDEX: 21.23 KG/M2 | HEIGHT: 63 IN | OXYGEN SATURATION: 98 % | HEART RATE: 83 BPM

## 2023-05-15 DIAGNOSIS — R39.9 UTI SYMPTOMS: Primary | ICD-10-CM

## 2023-05-15 LAB
BACTERIA UR QL AUTO: NORMAL /HPF
BILIRUB UR QL STRIP: NEGATIVE
CLARITY UR: CLEAR
COLOR UR: NORMAL
GLUCOSE UR STRIP-MCNC: NEGATIVE MG/DL
HGB UR QL STRIP.AUTO: NEGATIVE
KETONES UR STRIP-MCNC: NEGATIVE MG/DL
LEUKOCYTE ESTERASE UR QL STRIP: NEGATIVE
NITRITE UR QL STRIP: NEGATIVE
NON-SQ EPI CELLS URNS QL MICRO: NORMAL /HPF
PH UR STRIP.AUTO: 7 [PH]
PROT UR STRIP-MCNC: NEGATIVE MG/DL
RBC #/AREA URNS AUTO: NORMAL /HPF
SP GR UR STRIP.AUTO: 1.01 (ref 1–1.03)
UROBILINOGEN UR STRIP-ACNC: <2 MG/DL
WBC #/AREA URNS AUTO: NORMAL /HPF

## 2023-05-15 NOTE — PROGRESS NOTES
"5/15/2023    Shahana Fuentes  1940  [id number]    Diagnosis  Chief Complaint    2/24 PTNS       Patient presents for PTNS 2 of 24 managed by the [facility] office    Plan  Follow up as scheduled for next PTNS treatment  Advised to contact the office in the meantime with any questions or concerns  Urinary Incontinence Screening    Flowsheet Row Most Recent Value   Urinary Incontinence    Urinary Incontinence? Yes  [tiny bit of leaking]   Incomplete emptying? No   Urinary frequency? No  [not any more than usual]   Urinary urgency? No   Urinary hesitancy? No   Dysuria (painful difficult urination)? No   Nocturia (waking up to use the bathroom)? Yes  [1-2]   Straining (having to push to go)? No   Weak stream? Yes   Intermittent stream? No   Post void dribbling? Yes  [some leakage]          Vitals:    05/15/23 1031   BP: 118/64   BP Location: Left arm   Patient Position: Sitting   Cuff Size: Adult   Pulse: 83   SpO2: 98%   Weight: 54 3 kg (119 lb 12 8 oz)   Height: 5' 3\" (1 6 m)       Procedure PTNS    Session 2 of 24    Ankle used: right  Treatment setting: 3-6  Duration of treatment: 30 minutes  Lead lot #:[id number]  Expiration Date:04/05/2025  Feeling/Response: Tingling to toes and foot              NELY Mccann, RN  "

## 2023-05-17 LAB — BACTERIA UR CULT: NORMAL

## 2023-06-05 NOTE — PATIENT INSTRUCTIONS
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 UP      Assessment and Plan:  Based on a thorough discussion of this condition and the management approach to it (including a comprehensive discussion of the known risks, side effects and potential benefits of treatment), the patient (family) agrees to implement the following specific plan:   Will hold off on kenalog injections today   Continue dutasteride 0 5 mg by mouth daily as directed   Continue Minoxidil 2 5 mg once a day by mouth as directed   Follow up in 3-4 months     TRAUMATIC SKIN AVULSION       Assessment and Plan:  Based on a thorough discussion of this condition and the management approach to it (including a comprehensive discussion of the known risks, side effects and potential benefits of treatment), the patient (family) agrees to implement the following specific plan:  Start Medi honey twice a day topically to affected wound until improved (samples give)   Advised to keep the area covered during the day and open at night   Reassured to expect healing time to take at least 6-8 weeks 117

## 2023-06-16 ENCOUNTER — PROCEDURE VISIT (OUTPATIENT)
Dept: UROLOGY | Facility: AMBULATORY SURGERY CENTER | Age: 83
End: 2023-06-16
Payer: COMMERCIAL

## 2023-06-16 VITALS
BODY MASS INDEX: 20.73 KG/M2 | HEART RATE: 57 BPM | WEIGHT: 117 LBS | SYSTOLIC BLOOD PRESSURE: 110 MMHG | OXYGEN SATURATION: 99 % | DIASTOLIC BLOOD PRESSURE: 70 MMHG

## 2023-06-16 DIAGNOSIS — N39.41 URGE INCONTINENCE OF URINE: ICD-10-CM

## 2023-06-16 PROCEDURE — 64566 NEUROELTRD STIM POST TIBIAL: CPT

## 2023-06-16 NOTE — PROGRESS NOTES
6/16/2023    Mario Alberto Hunter  1940  [id number]    Diagnosis  Chief Complaint    Urinary Incontinence        Patient presents for PTNS 3 of 24 managed by Dr Pedro Ball  Patient will follow up in 1 month      Vitals:    06/16/23 1037   BP: 110/70   Pulse: 57   SpO2: 99%   Weight: 53 1 kg (117 lb)         Procedure PTNS      Session 3 of 24    Ankle used: right  Treatment setting:3  Duration of treatment: 30 minutes  Lead lot #:[id number]  Expiration Date:06/17/2024  Feeling/Response:aziza in ankle    Patient Goals and Progress  Decreased Urgency Yes  Decreased Frequency Yes  Episodes of incontinence has some leakage Sleep Through Night Yes  Related Health and Social Factors No      Treatment Plan  Increase Fluids Yes  Decrease Caffeine No  Decrease FluidsNo  Kegels Yes  Timed voiding No  No fluids before bed Yes          Gina Conti RN

## 2023-07-11 ENCOUNTER — OFFICE VISIT (OUTPATIENT)
Age: 83
End: 2023-07-11
Payer: COMMERCIAL

## 2023-07-11 VITALS — BODY MASS INDEX: 20.73 KG/M2 | TEMPERATURE: 97.7 F | HEIGHT: 63 IN

## 2023-07-11 DIAGNOSIS — L66.1 LICHEN PLANOPILARIS: ICD-10-CM

## 2023-07-11 PROCEDURE — 99214 OFFICE O/P EST MOD 30 MIN: CPT | Performed by: DERMATOLOGY

## 2023-07-11 RX ORDER — FOLIC ACID 1 MG/1
5 TABLET ORAL
COMMUNITY

## 2023-07-11 RX ORDER — SPIRONOLACTONE 25 MG/1
25 TABLET ORAL DAILY
COMMUNITY
Start: 2023-05-18

## 2023-07-11 RX ORDER — DUTASTERIDE 0.5 MG/1
0.5 CAPSULE, LIQUID FILLED ORAL DAILY
Qty: 90 CAPSULE | Refills: 2 | Status: SHIPPED | OUTPATIENT
Start: 2023-07-11

## 2023-07-11 NOTE — PATIENT INSTRUCTIONS
Assessment and Plan:  Based on a thorough discussion of this condition and the management approach to it (including a comprehensive discussion of the known risks, side effects and potential benefits of treatment), the patient (family) agrees to implement the following specific plan:  Continue Calcipotriene 0.005% solution 2 times a day for the next 6 months   Continue Dutasteride 0.5 mg by mouth for the next 6 months   If improved after 6 months will stop oral medication   No kenalog injections done today   Stop Minoxidil

## 2023-07-11 NOTE — PROGRESS NOTES
[facility] Note     Patient Name: Virgil Aguilar  Encounter Date: 07/11/2023    Have you been cared for by a [facility] in the last 3 years and, if so, which description applies to you? Yes. I have been here within the last 3 years, and my medical history has NOT changed since that time. I am FEMALE/of child-bearing potential.    REVIEW OF SYSTEMS:  Have you recently had or currently have any of the following? · No changes in my recent health. PAST MEDICAL HISTORY:  Have you personally ever had or currently have any of the following? If "YES," then please provide more detail. · No changes in my medical history. FAMILY HISTORY:  Any "first degree relatives" (parent, brother, sister, or child) with the following? • No changes in my family's known health. PATIENT EXPERIENCE:    • Do you want the Dermatologist to perform a COMPLETE skin exam today including a clinical examination under the "bra and underwear" areas? NO  • If necessary, do we have your permission to call and leave a detailed message on your Preferred Phone number that includes your specific medical information?   Yes      Allergies   Allergen Reactions   • Albuterol Other (See Comments)     Atrial fibrillation   • Dapsone Facial Swelling   • Epinephrine    • Mupirocin    • Seasonal Ic [Cholestatin] Itching   • Cephalosporins Rash      Current Outpatient Medications:   •  spironolactone (ALDACTONE) 25 mg tablet, Take 25 mg by mouth daily, Disp: , Rfl:   •  b complex vitamins capsule, Take 1 capsule by mouth daily, Disp: , Rfl:   •  calcipotriene (DOVONEX) 0.005 % topical solution, APPLY TO AFFECTED AREA(S) TWO TIMES A DAY (GENERIC DOVONEX), Disp: 60 mL, Rfl: 2  •  Cholecalciferol (VITAMIN D3 PO), Take by mouth, Disp: , Rfl:   •  clobetasol (TEMOVATE) 0.05 % ointment, , Disp: , Rfl:   •  cyanocobalamin (VITAMIN B-12) 100 mcg tablet, Take by mouth daily, Disp: , Rfl:   •  cycloSPORINE (RESTASIS) 0.05 % ophthalmic emulsion, 1 drop by Tube route 2 (two) times a day, Disp: , Rfl:   •  dutasteride (AVODART) 0.5 mg capsule, Take 1 capsule (0.5 mg total) by mouth daily, Disp: 90 capsule, Rfl: 2  •  Eliquis 2.5 MG, , Disp: , Rfl:   •  estradiol (ESTRACE) 0.1 mg/g vaginal cream, Insert into the vagina 2 (two) times a week (Patient not taking: Reported on 4/17/2023), Disp: , Rfl:   •  folic acid (FOLVITE) 1 mg tablet, TAKE ONE TABLET BY MOUTH EVERY DAY WHILE ON METHOTREXATE TO PREVENT SIDE EFFECTS (GENERIC FOR Barbie Coon), Disp: 90 tablet, Rfl: 2  •  folic acid (FOLVITE) 1 mg tablet, Take 5 mg by mouth, Disp: , Rfl:   •  Ivermectin (Soolantra) 1 % CREA, Apply topically 1-2 times a day to face area as needed for rosacea., Disp: 45 g, Rfl: 5  •  Lutein 40 MG CAPS, Take by mouth daily  , Disp: , Rfl:   •  Magnesium 500 MG CAPS, Take by mouth, Disp: , Rfl:   •  Magnesium Gluconate 550 MG TABS, Take 30 mg by mouth 2 (two) times a day  , Disp: , Rfl:   •  meloxicam (MOBIC) 15 mg tablet, , Disp: , Rfl:   •  metoprolol succinate (TOPROL-XL) 25 mg 24 hr tablet, Take 25 mg by mouth in the morning., Disp: , Rfl:   •  minoxidil (LONITEN) 2.5 mg tablet, TAKE ONE-HALF TABLET BY MOUTH EVERY DAY -GENERIC FOR LONITEN, Disp: 90 tablet, Rfl: 2  •  valACYclovir (VALTREX) 1,000 mg tablet, Take 1 tablet (1,000 mg total) by mouth 2 (two) times a day for 3 days, Disp: 6 tablet, Rfl: 0          • Whom besides the patient is providing clinical information about today's encounter?   o NO ADDITIONAL HISTORIAN (patient alone provided history)    Physical Exam and Assessment/Plan by Diagnosis:       LICHEN PLANUS     Physical Exam:  • Anatomic Location Affected: Anterior vertex scalp   • Morphological Description:  New growth noted. Minimal erythema   • Pertinent Positives:  • Pertinent Negatives:     Additional History of Present Condition:  Previous visit  Kenalog injectione was done. Also advised to continue minoxidil, dutasteride and calcipotriene solution.  She she stopped taking the minoxidil due to hx of heart issues. Advised by cardiologist to stop and apply the topical minoxidil . She states she has noticed improvement      Assessment and Plan:  Based on a thorough discussion of this condition and the management approach to it (including a comprehensive discussion of the known risks, side effects and potential benefits of treatment), the patient (family) agrees to implement the following specific plan:  • Continue Calcipotriene 0.005% solution 2 times a day for the next 6 months   • Continue Dutasteride 0.5 mg by mouth for the next 6 months   • If improved after 6 months will stop oral medication   • No kenalog injections done today   • Stop Minoxidil      What is lichen planus? Lichen planus is a chronic inflammatory skin condition affecting the skin and mucosal surfaces. There are several clinical types of lichen planus that share similar features on histopathology. • Cutaneous lichen planus  • Mucosal lichen planus  • Lichen planopilaris  • Lichen planus of the nails  • Lichen planus pigmentosus  • Lichenoid drug eruption     Who gets lichen planus? Lichen planus affects about one in one hundred people worldwide, mostly affecting adults over the age of 36 years. About half those affected have oral lichen planus, which is more common in women than in men. About 90% have lichen planus of the nails.     What causes lichen planus? Lichen planus is a T cell-mediated autoimmune disorder, in which inflammatory cells attack an unknown protein within the skin and mucosal keratinocytes.   Contributing factors to lichen planus may include:  • Genetic predisposition  • Physical and emotional stress  • Injury to the skin; lichen planus often appears where the skin has been scratched or after surgery -- this is called the isomorphic response (koebnerisation)  • Localized skin disease such as herpes zoster--isotopic response  • Systemic viral infection, such as hepatitis C (which might modify self-antigens on the surface of basal keratinocytes)  • Contact allergy, such as to metal fillings in oral lichen planus (rare)  • Drugs; gold, quinine, quinidine and others can cause a lichenoid rash     A lichenoid inflammation is also notable in [id number] disease, a complication of a bone marrow transplant.     What are the clinical features of lichen planus? Lichen planus may cause a small number or many lesions on the skin and mucosal surfaces.     Cutaneous lichen planus  The usual presentation of the disease is classical lichen planus. Symptoms can range from none (uncommon) to intense itch. • Papules and polygonal plaques are shiny, flat-topped and firm on palpation. • The plaques are crossed by fine white lines called Adelfo striae. • Hypertrophic lichen planus can be scaly. • Bullous lichen planus is rare. • Size ranges from pinpoint to larger than a centimeter. • Distribution may be scattered, clustered, linear, annular or actinic (sun-exposed sites such as face, neck and backs of the hands). • Location can be anywhere, but most often front of the wrists, lower back, and ankles. • Colour depends on the patient's skin type. New papules and plaques often have a purple or violet hue, except on palms and soles where they are yellowish brown. • Plaques resolve after some months to leave greyish-brown post-inflammatory macules that can take a year or longer to fade.     Oral lichen planus  The mouth is often the only affected area. Oral lichen planus often involves the inside of the cheeks and the sides of the tongue, but the gums and lips may also be involved.  The most common patterns are:  • Painless white streaks in a lacy or fern-like pattern  • Painful and persistent erosions and ulcers (erosive lichen planus)  • Diffuse redness and peeling of the gums (desquamative gingivitis)  • Localized inflammation of the gums adjacent to amalgam fillings     Vulval lichen planus  Lichen planus may affect labia majora, labia minora and vaginal introitus. Presentation includes:  • Painless white streaks in a lacy or fern-like pattern  • Painful and persistent erosions and ulcers (erosive lichen planus )  • Scarring, resulting in adhesions, resorption of labia minora and introital stenosis  • Painful desquamative vaginitis, preventing intercourse and causing a mucky vaginal discharge. The eroded vagina may bleed easily on contact  • Overlap with vulval lichen sclerosus, an inflammatory skin disorder that most commonly affects women over 48years of age.     Penile lichen planus  • Penile lichen planus usually presents with classical papules in a ring around the glans. White streaks and erosive lichen planus may occur but are less common.     Other mucosal sites  • Erosive lichen planus uncommonly affects the lacrimal glands, eyelids, external ear canal, oesophagus, larynx, bladder and anus.     Lichen planopilaris  • Lichen planopilaris presents as tiny red spiny follicular papules on the scalp or less often, elsewhere on the body. Rarely, blistering occurs in the lesions. Destruction of the hair follicles leads to permanently bald patches characterized by sparse “lonely hairs”. • Frontal fibrosing alopecia is a form of lichen planopilaris that affects the anterior scalp, forehead and eyebrows. • Pseudopelade of Brocq is probably a variant of lichen planus without inflammation or scaling. Areas of scarring without hair slowly appear, described as “like footprints in the snow”.    Lichen planus pigmentosus  • Lichen planus pigmentosus describes ill-defined oval, greyish brown marks on the face and neck or trunk and limbs without an inflammatory phase. It is a form of acquired dermal macular hyperpigmentation. It can be provoked by sun exposure, but it can also arise in sun-protected sites such as the armpits. It has diffuse, reticulate and diffuse patterns.  Lichen planus pigmentosus is similar to erythema dyschromicum perstans and may be the same disease. • Lichen planus pigmentosus may rarely affect the lips, resulting in a patchy dark pigmentation on upper and lower lips.     Lichenoid drug eruption  • Lichenoid drug eruption refers to a lichen planus-like rash caused by medications. Asymptomatic or itchy; pink, brown or purple; flat, slightly scaly patches most often arise on the trunk. The oral mucosa (oral lichenoid reaction) and other sites are also sometimes affected. Many drugs can rarely cause lichenoid eruptions. The most common are:  • Gold  • Hydroxychloroquine  • Captopril     What are the complications of lichen planus? Hypertrophic lichen planus may resemble squamous cell carcinoma. However, rarely, longstanding erosive lichen planus can result in true squamous cell carcinoma, most often in the mouth (oral cancer) or on the vulva (vulval cancer) or penis (penile cancer). This should be suspected if there is an enlarging nodule or an ulcer with thickened edges in these sites. Cancer is more common in smokers, those with a history of cancer in mucosal sites, and in those who carry sexually acquired and oncogenic human papillomavirus. Cancer from other forms of lichen planus is rare.     How is lichen planus diagnosed? In most cases, lichen planus is diagnosed by observing its clinical features. A biopsy is often recommended to confirm or make the diagnosis and to look for cancer. The histopathological signs are of a lichenoid tissue reaction affecting the epidermis.   Typical features include:  • Irregularly thickened epidermis  • Degenerative skin cells  • Liquefaction degeneration of the basal layer of the epidermis  • Band of inflammatory cells just beneath the epidermis  • Melanin (pigment) beneath the epidermis     Direct immunofluorescent staining may reveal deposits of immunoglobulins at the base of the epidermis.     Patch tests may be recommended for patients with oral lichen planus affecting the gums and who have amalgam fillings, to assess for contact allergy to thiomersal (a mercurial compound).    What is the treatment for lichen planus? Treatment is not always necessary. Local treatments for symptomatic cutaneous or mucosal disease are:  • Potent topical steroids  • Topical calcineurin inhibitors, tacrolimus ointment and pimecrolimus cream  • Topical retinoids  • Intralesional steroid injections     Systemic treatment for widespread lichen planus or severe local disease often includes a 1 to 3-month course of oral prednisone, while commencing another agent from the following list:  • Acitretin  • Hydroxychloroquine  • Methotrexate  • Azathioprine  • Mycophenolate mofetil  • Phototherapy     In cases of oral lichen planus affecting the gums with contact allergy to mercury, the lichen planus may resolve on replacing the fillings with composite material. If the lichen planus is not due to mercury allergy, removing amalgam fillings is very unlikely to result in a cure. Anecdotal success is reported from long courses of oral antibiotics and oral antifungal agents. Lichen planopilaris is reported to improve with pioglitazone.     What is the outlook for lichen planus? Cutaneous lichen planus tends to clear within a couple of years in most people, but mucosal lichen planus is more likely to persist for a decade or longer. Spontaneous recovery is unpredictable, and lichen planus may recur at a later date.  Scarring is permanent, including balding of the scalp.         Scribe Attestation    I,:  Javier Dial am acting as a scribe while in the presence of the attending physician.:       I,:  Stewart Mcintosh MD personally performed the services described in this documentation    as scribed in my presence.:

## 2023-08-22 ENCOUNTER — TELEPHONE (OUTPATIENT)
Dept: DERMATOLOGY | Facility: CLINIC | Age: 83
End: 2023-08-22

## 2023-08-22 NOTE — TELEPHONE ENCOUNTER
Rec'd call from patient requesting a return call from clinical for possibly an urgent appt OR something she can do to alleviate symptoms for: inflamed scalp. She states that her hair is coming out in clumps. She states this has been going on for a week. Please return her call to cell phone number on file.

## 2023-08-22 NOTE — TELEPHONE ENCOUNTER
Called patient, no answer. LVM for patient to return phone call back to get more clinical information of what she is currently using for the itchiness of the scalp.

## 2023-08-23 NOTE — TELEPHONE ENCOUNTER
Pt returned call and scheduled for next available 9/6 for injections to scalp. Pt didn't provide any more information about inflamamtion issue, just that the injections to scalp help with her issues.

## 2023-09-11 ENCOUNTER — TELEPHONE (OUTPATIENT)
Age: 83
End: 2023-09-11

## 2023-09-11 ENCOUNTER — OFFICE VISIT (OUTPATIENT)
Age: 83
End: 2023-09-11
Payer: COMMERCIAL

## 2023-09-11 VITALS — TEMPERATURE: 96.9 F | BODY MASS INDEX: 20.02 KG/M2 | HEIGHT: 63 IN | WEIGHT: 113 LBS

## 2023-09-11 DIAGNOSIS — L43.9 LICHEN PLANUS: Primary | ICD-10-CM

## 2023-09-11 PROCEDURE — 11900 INJECT SKIN LESIONS </W 7: CPT | Performed by: DERMATOLOGY

## 2023-09-11 NOTE — PATIENT INSTRUCTIONS
LICHEN PLANUS     Assessment and Plan:  Based on a thorough discussion of this condition and the management approach to it (including a comprehensive discussion of the known risks, side effects and potential benefits of treatment), the patient (family) agrees to implement the following specific plan:  Kenalog injection done in office today; Consent signed  Ambulatory referral to Dr. Joellen Gonzalez for [facility] consult  Follow up in 2 months     What is lichen planus? Lichen planus is a chronic inflammatory skin condition affecting the skin and mucosal surfaces. There are several clinical types of lichen planus that share similar features on histopathology. Cutaneous lichen planus  Mucosal lichen planus  Lichen planopilaris  Lichen planus of the nails  Lichen planus pigmentosus  Lichenoid drug eruption     Who gets lichen planus? Lichen planus affects about one in one hundred people worldwide, mostly affecting adults over the age of 36 years. About half those affected have oral lichen planus, which is more common in women than in men. About 74% have lichen planus of the nails. What causes lichen planus? Lichen planus is a T cell-mediated autoimmune disorder, in which inflammatory cells attack an unknown protein within the skin and mucosal keratinocytes.   Contributing factors to lichen planus may include:  Genetic predisposition  Physical and emotional stress  Injury to the skin; lichen planus often appears where the skin has been scratched or after surgery -- this is called the isomorphic response (koebnerisation)  Localized skin disease such as herpes zoster--isotopic response  Systemic viral infection, such as hepatitis C (which might modify self-antigens on the surface of basal keratinocytes)  Contact allergy, such as to metal fillings in oral lichen planus (rare)  Drugs; gold, quinine, quinidine and others can cause a lichenoid rash     A lichenoid inflammation is also notable in [id number] disease, a complication of a bone marrow transplant. What are the clinical features of lichen planus? Lichen planus may cause a small number or many lesions on the skin and mucosal surfaces. Cutaneous lichen planus  The usual presentation of the disease is classical lichen planus. Symptoms can range from none (uncommon) to intense itch. Papules and polygonal plaques are shiny, flat-topped and firm on palpation. The plaques are crossed by fine white lines called Adelfo striae. Hypertrophic lichen planus can be scaly. Bullous lichen planus is rare. Size ranges from pinpoint to larger than a centimeter. Distribution may be scattered, clustered, linear, annular or actinic (sun-exposed sites such as face, neck and backs of the hands). Location can be anywhere, but most often front of the wrists, lower back, and ankles. Colour depends on the patient's skin type. New papules and plaques often have a purple or violet hue, except on palms and soles where they are yellowish brown. Plaques resolve after some months to leave greyish-brown post-inflammatory macules that can take a year or longer to fade. Oral lichen planus  The mouth is often the only affected area. Oral lichen planus often involves the inside of the cheeks and the sides of the tongue, but the gums and lips may also be involved. The most common patterns are:  Painless white streaks in a lacy or fern-like pattern  Painful and persistent erosions and ulcers (erosive lichen planus)  Diffuse redness and peeling of the gums (desquamative gingivitis)  Localized inflammation of the gums adjacent to amalgam fillings     Vulval lichen planus  Lichen planus may affect labia majora, labia minora and vaginal introitus.  Presentation includes:  Painless white streaks in a lacy or fern-like pattern  Painful and persistent erosions and ulcers (erosive lichen planus )  Scarring, resulting in adhesions, resorption of labia minora and introital stenosis  Painful desquamative vaginitis, preventing intercourse and causing a mucky vaginal discharge. The eroded vagina may bleed easily on contact  Overlap with vulval lichen sclerosus, an inflammatory skin disorder that most commonly affects women over 48years of age. Penile lichen planus  Penile lichen planus usually presents with classical papules in a ring around the glans. White streaks and erosive lichen planus may occur but are less common. Other mucosal sites  Erosive lichen planus uncommonly affects the lacrimal glands, eyelids, external ear canal, oesophagus, larynx, bladder and anus. Lichen planopilaris  Lichen planopilaris presents as tiny red spiny follicular papules on the scalp or less often, elsewhere on the body. Rarely, blistering occurs in the lesions. Destruction of the hair follicles leads to permanently bald patches characterized by sparse “lonely hairs”. Frontal fibrosing alopecia is a form of lichen planopilaris that affects the anterior scalp, forehead and eyebrows. Pseudopelade of Brocq is probably a variant of lichen planus without inflammation or scaling. Areas of scarring without hair slowly appear, described as “like footprints in the snow”. Lichen planus pigmentosus  Lichen planus pigmentosus describes ill-defined oval, greyish brown marks on the face and neck or trunk and limbs without an inflammatory phase. It is a form of acquired dermal macular hyperpigmentation. It can be provoked by sun exposure, but it can also arise in sun-protected sites such as the armpits. It has diffuse, reticulate and diffuse patterns. Lichen planus pigmentosus is similar to erythema dyschromicum perstans and may be the same disease. Lichen planus pigmentosus may rarely affect the lips, resulting in a patchy dark pigmentation on upper and lower lips. Lichenoid drug eruption  Lichenoid drug eruption refers to a lichen planus-like rash caused by medications.  Asymptomatic or itchy; pink, brown or purple; flat, slightly scaly patches most often arise on the trunk. The oral mucosa (oral lichenoid reaction) and other sites are also sometimes affected. Many drugs can rarely cause lichenoid eruptions. The most common are:  Gold  Hydroxychloroquine  Captopril     What are the complications of lichen planus? Hypertrophic lichen planus may resemble squamous cell carcinoma. However, rarely, longstanding erosive lichen planus can result in true squamous cell carcinoma, most often in the mouth (oral cancer) or on the vulva (vulval cancer) or penis (penile cancer). This should be suspected if there is an enlarging nodule or an ulcer with thickened edges in these sites. Cancer is more common in smokers, those with a history of cancer in mucosal sites, and in those who carry sexually acquired and oncogenic human papillomavirus. Cancer from other forms of lichen planus is rare. How is lichen planus diagnosed? In most cases, lichen planus is diagnosed by observing its clinical features. A biopsy is often recommended to confirm or make the diagnosis and to look for cancer. The histopathological signs are of a lichenoid tissue reaction affecting the epidermis. Typical features include:  Irregularly thickened epidermis  Degenerative skin cells  Liquefaction degeneration of the basal layer of the epidermis  Band of inflammatory cells just beneath the epidermis  Melanin (pigment) beneath the epidermis     Direct immunofluorescent staining may reveal deposits of immunoglobulins at the base of the epidermis. Patch tests may be recommended for patients with oral lichen planus affecting the gums and who have amalgam fillings, to assess for contact allergy to thiomersal (a mercurial compound). What is the treatment for lichen planus? Treatment is not always necessary.  Local treatments for symptomatic cutaneous or mucosal disease are:  Potent topical steroids  Topical calcineurin inhibitors, tacrolimus ointment and pimecrolimus cream  Topical retinoids  Intralesional steroid injections     Systemic treatment for widespread lichen planus or severe local disease often includes a 1 to 3-month course of oral prednisone, while commencing another agent from the following list:  Acitretin  Hydroxychloroquine  Methotrexate  Azathioprine  Mycophenolate mofetil  Phototherapy     In cases of oral lichen planus affecting the gums with contact allergy to mercury, the lichen planus may resolve on replacing the fillings with composite material. If the lichen planus is not due to mercury allergy, removing amalgam fillings is very unlikely to result in a cure. Anecdotal success is reported from long courses of oral antibiotics and oral antifungal agents. Lichen planopilaris is reported to improve with pioglitazone. What is the outlook for lichen planus? Cutaneous lichen planus tends to clear within a couple of years in most people, but mucosal lichen planus is more likely to persist for a decade or longer. Spontaneous recovery is unpredictable, and lichen planus may recur at a later date. Scarring is permanent, including balding of the scalp. PROCEDURE:  INTRALESIONAL STEROID INJECTION (KENALOG INJECTION)    Purpose: Triamcinolone is a synthetic glucocorticoid corticosteroid that has marked anti-inflammatory action. It is prepared in sterile aqueous suspension suitable for injecting directly into a lesion on or immediately below the skin to treat a dermal inflammatory process. Indications: It is indicated for alopecia areata; inflammatory acne cysts; discoid lupus erythematosus; keloids and hypertrophic scars; inflammatory lesions of granuloma annulare, lichen planus, lichen simplex chronicus (neurodermatitis), psoriatic plaques, and other localized inflammatory skin conditions.      Potential Side Effects: I understand that triamcinolone injection can potentially cause early and/or delayed adverse effects such as:    Pain Impaired wound healing    Increased hair growth    Bleeding    White or brown marks    Steroid acne    Infection    Telangiectasia    Skin thinning    Cutaneous and subcutaneous lipoatrophy (most common) appearing as skin indentations or dimples around the injection sites a few weeks after treatment

## 2023-09-11 NOTE — PROGRESS NOTES
[facility] Note     Patient Name: Oscar Means  Encounter Date: 9/11/23    Have you been cared for by a [facility] in the last 3 years and, if so, which description applies to you? Yes. I have been here within the last 3 years, and my medical history has NOT changed since that time. I am FEMALE/of child-bearing potential.    REVIEW OF SYSTEMS:  Have you recently had or currently have any of the following? No changes in my recent health. PAST MEDICAL HISTORY:  Have you personally ever had or currently have any of the following? If "YES," then please provide more detail. No changes in my medical history. FAMILY HISTORY:  Any "first degree relatives" (parent, brother, sister, or child) with the following? No changes in my family's known health. PATIENT EXPERIENCE:    Do you want the Dermatologist to perform a COMPLETE skin exam today including a clinical examination under the "bra and underwear" areas? NO  If necessary, do we have your permission to call and leave a detailed message on your Preferred Phone number that includes your specific medical information?   Yes      Allergies   Allergen Reactions   • Albuterol Other (See Comments)     Atrial fibrillation   • Dapsone Facial Swelling   • Epinephrine    • Mupirocin    • Seasonal Ic [Cholestatin] Itching   • Cephalosporins Rash      Current Outpatient Medications:   •  b complex vitamins capsule, Take 1 capsule by mouth daily, Disp: , Rfl:   •  calcipotriene (DOVONEX) 0.005 % topical solution, APPLY TO AFFECTED AREA(S) TWO TIMES A DAY (GENERIC DOVONEX), Disp: 60 mL, Rfl: 2  •  Cholecalciferol (VITAMIN D3 PO), Take by mouth, Disp: , Rfl:   •  clobetasol (TEMOVATE) 0.05 % ointment, , Disp: , Rfl:   •  cyanocobalamin (VITAMIN B-12) 100 mcg tablet, Take by mouth daily, Disp: , Rfl:   •  cycloSPORINE (RESTASIS) 0.05 % ophthalmic emulsion, 1 drop by Tube route 2 (two) times a day, Disp: , Rfl:   •  dutasteride (AVODART) 0.5 mg capsule, Take 1 capsule (0.5 mg total) by mouth daily, Disp: 90 capsule, Rfl: 2  •  folic acid (FOLVITE) 1 mg tablet, Take 5 mg by mouth, Disp: , Rfl:   •  Ivermectin (Soolantra) 1 % CREA, Apply topically 1-2 times a day to face area as needed for rosacea., Disp: 45 g, Rfl: 5  •  Lutein 40 MG CAPS, Take by mouth daily  , Disp: , Rfl:   •  Magnesium 500 MG CAPS, Take by mouth, Disp: , Rfl:   •  metoprolol succinate (TOPROL-XL) 25 mg 24 hr tablet, Take 25 mg by mouth in the morning., Disp: , Rfl:   •  minoxidil (LONITEN) 2.5 mg tablet, TAKE ONE-HALF TABLET BY MOUTH EVERY DAY -GENERIC FOR LONITEN, Disp: 90 tablet, Rfl: 2  •  spironolactone (ALDACTONE) 25 mg tablet, Take 25 mg by mouth daily, Disp: , Rfl:   •  Eliquis 2.5 MG, , Disp: , Rfl:   •  estradiol (ESTRACE) 0.1 mg/g vaginal cream, Insert into the vagina 2 (two) times a week (Patient not taking: Reported on 4/17/2023), Disp: , Rfl:   •  folic acid (FOLVITE) 1 mg tablet, TAKE ONE TABLET BY MOUTH EVERY DAY WHILE ON METHOTREXATE TO PREVENT SIDE EFFECTS (GENERIC FOR FOLVITE), Disp: 90 tablet, Rfl: 2  •  Magnesium Gluconate 550 MG TABS, Take 30 mg by mouth 2 (two) times a day  , Disp: , Rfl:   •  meloxicam (MOBIC) 15 mg tablet, , Disp: , Rfl:   •  valACYclovir (VALTREX) 1,000 mg tablet, Take 1 tablet (1,000 mg total) by mouth 2 (two) times a day for 3 days, Disp: 6 tablet, Rfl: 0  No current facility-administered medications for this visit. Whom besides the patient is providing clinical information about today's encounter?    NO ADDITIONAL HISTORIAN (patient alone provided history)    Physical Exam and Assessment/Plan by Diagnosis:    LICHEN PLANUS     Physical Exam:  Anatomic Location Affected:  Vertex and right partial scalp  Morphological Description:  red scaly follicular papules  Pertinent Positives:  Pertinent Negatives:     Additional History of Present Condition:  Patient had    Kenalog injections at last visit, patient takes 2.5 mg minoxidil in which she restarted after  and applies the foam, patient also uses calcipotriene solution on scalp and has tried dutasteride . She had stopped taking the minoxidil due to hx of heart issues in past but restarted about 6 months ago . She states she has noticed more hair loss. Could not tolerate methotrexate - gave recurrent UTIs. Improved with laser but not covered by insurance     Assessment and Plan:  Based on a thorough discussion of this condition and the management approach to it (including a comprehensive discussion of the known risks, side effects and potential benefits of treatment), the patient (family) agrees to implement the following specific plan:  Kenalog injection done in office today; Consent signed  Ambulatory referral to Dr. Gavino Nguyen for PRP consult  Follow up in 2 months     What is lichen planus? Lichen planus is a chronic inflammatory skin condition affecting the skin and mucosal surfaces. There are several clinical types of lichen planus that share similar features on histopathology. Cutaneous lichen planus  Mucosal lichen planus  Lichen planopilaris  Lichen planus of the nails  Lichen planus pigmentosus  Lichenoid drug eruption     Who gets lichen planus? Lichen planus affects about one in one hundred people worldwide, mostly affecting adults over the age of 36 years. About half those affected have oral lichen planus, which is more common in women than in men. About 22% have lichen planus of the nails.     What causes lichen planus? Lichen planus is a T cell-mediated autoimmune disorder, in which inflammatory cells attack an unknown protein within the skin and mucosal keratinocytes.   Contributing factors to lichen planus may include:  Genetic predisposition  Physical and emotional stress  Injury to the skin; lichen planus often appears where the skin has been scratched or after surgery -- this is called the isomorphic response (koebnerisation)  Localized skin disease such as herpes zoster--isotopic response  Systemic viral infection, such as hepatitis C (which might modify self-antigens on the surface of basal keratinocytes)  Contact allergy, such as to metal fillings in oral lichen planus (rare)  Drugs; gold, quinine, quinidine and others can cause a lichenoid rash     A lichenoid inflammation is also notable in [id number] disease, a complication of a bone marrow transplant.     What are the clinical features of lichen planus? Lichen planus may cause a small number or many lesions on the skin and mucosal surfaces.     Cutaneous lichen planus  The usual presentation of the disease is classical lichen planus. Symptoms can range from none (uncommon) to intense itch. Papules and polygonal plaques are shiny, flat-topped and firm on palpation. The plaques are crossed by fine white lines called Adelfo striae. Hypertrophic lichen planus can be scaly. Bullous lichen planus is rare. Size ranges from pinpoint to larger than a centimeter. Distribution may be scattered, clustered, linear, annular or actinic (sun-exposed sites such as face, neck and backs of the hands). Location can be anywhere, but most often front of the wrists, lower back, and ankles. Colour depends on the patient's skin type. New papules and plaques often have a purple or violet hue, except on palms and soles where they are yellowish brown. Plaques resolve after some months to leave greyish-brown post-inflammatory macules that can take a year or longer to fade.     Oral lichen planus  The mouth is often the only affected area. Oral lichen planus often involves the inside of the cheeks and the sides of the tongue, but the gums and lips may also be involved.  The most common patterns are:  Painless white streaks in a lacy or fern-like pattern  Painful and persistent erosions and ulcers (erosive lichen planus)  Diffuse redness and peeling of the gums (desquamative gingivitis)  Localized inflammation of the gums adjacent to amalgam fillings     Vulval lichen planus  Lichen planus may affect labia majora, labia minora and vaginal introitus. Presentation includes:  Painless white streaks in a lacy or fern-like pattern  Painful and persistent erosions and ulcers (erosive lichen planus )  Scarring, resulting in adhesions, resorption of labia minora and introital stenosis  Painful desquamative vaginitis, preventing intercourse and causing a mucky vaginal discharge. The eroded vagina may bleed easily on contact  Overlap with vulval lichen sclerosus, an inflammatory skin disorder that most commonly affects women over 48years of age.     Penile lichen planus  Penile lichen planus usually presents with classical papules in a ring around the glans. White streaks and erosive lichen planus may occur but are less common.     Other mucosal sites  Erosive lichen planus uncommonly affects the lacrimal glands, eyelids, external ear canal, oesophagus, larynx, bladder and anus.     Lichen planopilaris  Lichen planopilaris presents as tiny red spiny follicular papules on the scalp or less often, elsewhere on the body. Rarely, blistering occurs in the lesions. Destruction of the hair follicles leads to permanently bald patches characterized by sparse “lonely hairs”. Frontal fibrosing alopecia is a form of lichen planopilaris that affects the anterior scalp, forehead and eyebrows. Pseudopelade of Brocq is probably a variant of lichen planus without inflammation or scaling. Areas of scarring without hair slowly appear, described as “like footprints in the snow”.    Lichen planus pigmentosus  Lichen planus pigmentosus describes ill-defined oval, greyish brown marks on the face and neck or trunk and limbs without an inflammatory phase. It is a form of acquired dermal macular hyperpigmentation. It can be provoked by sun exposure, but it can also arise in sun-protected sites such as the armpits. It has diffuse, reticulate and diffuse patterns.  Lichen planus pigmentosus is similar to erythema dyschromicum perstans and may be the same disease. Lichen planus pigmentosus may rarely affect the lips, resulting in a patchy dark pigmentation on upper and lower lips.     Lichenoid drug eruption  Lichenoid drug eruption refers to a lichen planus-like rash caused by medications. Asymptomatic or itchy; pink, brown or purple; flat, slightly scaly patches most often arise on the trunk. The oral mucosa (oral lichenoid reaction) and other sites are also sometimes affected. Many drugs can rarely cause lichenoid eruptions. The most common are:  Gold  Hydroxychloroquine  Captopril     What are the complications of lichen planus? Hypertrophic lichen planus may resemble squamous cell carcinoma. However, rarely, longstanding erosive lichen planus can result in true squamous cell carcinoma, most often in the mouth (oral cancer) or on the vulva (vulval cancer) or penis (penile cancer). This should be suspected if there is an enlarging nodule or an ulcer with thickened edges in these sites. Cancer is more common in smokers, those with a history of cancer in mucosal sites, and in those who carry sexually acquired and oncogenic human papillomavirus. Urban Cleopatra from other forms of lichen planus is rare.     How is lichen planus diagnosed? In most cases, lichen planus is diagnosed by observing its clinical features. A biopsy is often recommended to confirm or make the diagnosis and to look for cancer. The histopathological signs are of a lichenoid tissue reaction affecting the epidermis.   Typical features include:  Irregularly thickened epidermis  Degenerative skin cells  Liquefaction degeneration of the basal layer of the epidermis  Band of inflammatory cells just beneath the epidermis  Melanin (pigment) beneath the epidermis     Direct immunofluorescent staining may reveal deposits of immunoglobulins at the base of the epidermis.     Patch tests may be recommended for patients with oral lichen planus affecting the gums and who have amalgam fillings, to assess for contact allergy to thiomersal (a mercurial compound).    What is the treatment for lichen planus? Treatment is not always necessary. Local treatments for symptomatic cutaneous or mucosal disease are:  Potent topical steroids  Topical calcineurin inhibitors, tacrolimus ointment and pimecrolimus cream  Topical retinoids  Intralesional steroid injections     Systemic treatment for widespread lichen planus or severe local disease often includes a 1 to 3-month course of oral prednisone, while commencing another agent from the following list:  Acitretin  Hydroxychloroquine  Methotrexate  Azathioprine  Mycophenolate mofetil  Phototherapy     In cases of oral lichen planus affecting the gums with contact allergy to mercury, the lichen planus may resolve on replacing the fillings with composite material. If the lichen planus is not due to mercury allergy, removing amalgam fillings is very unlikely to result in a cure. Anecdotal success is reported from long courses of oral antibiotics and oral antifungal agents. Lichen planopilaris is reported to improve with pioglitazone.     What is the outlook for lichen planus? Cutaneous lichen planus tends to clear within a couple of years in most people, but mucosal lichen planus is more likely to persist for a decade or longer. Spontaneous recovery is unpredictable, and lichen planus may recur at a later date. Scarring is permanent, including balding of the scalp.      PROCEDURE:  INTRALESIONAL STEROID INJECTION (KENALOG INJECTION)    Purpose: Triamcinolone is a synthetic glucocorticoid corticosteroid that has marked anti-inflammatory action. It is prepared in sterile aqueous suspension suitable for injecting directly into a lesion on or immediately below the skin to treat a dermal inflammatory process. Indications:  It is indicated for alopecia areata; inflammatory acne cysts; discoid lupus erythematosus; keloids and hypertrophic scars; inflammatory lesions of granuloma annulare, lichen planus, lichen simplex chronicus (neurodermatitis), psoriatic plaques, and other localized inflammatory skin conditions. Potential Side Effects: I understand that triamcinolone injection can potentially cause early and/or delayed adverse effects such as:   • Pain   • Impaired wound healing   • Increased hair growth   • Bleeding   • White or brown marks   • Steroid acne   • Infection   • Telangiectasia   • Skin thinning   • Cutaneous and subcutaneous lipoatrophy (most common) appearing as skin indentations or dimples around the injection sites a few weeks after treatment     PROCEDURE NOTE:  After verbal and written consent were obtained, the to-be-treated area was wiped and cleaned with rubbing alcohol 70%. Then, a total of 2 mL of Kenalog CONCENTRATION:  10 mg/mL   (Lot# [id number]; Expiration SEPT 2024, NDC#: 4077-4147-21) was injected intralesionally into a total of 1 lesion/s on the following anatomic areas:  Vertex and right partial scalp using a 1-mL syringe and a 30-gauge needle. There was less than 1 mL of blood loss and little to no discomfort. The area was bandaged with a Band-aid. The patient tolerated the procedure well and remained in the office for observation. With no signs of an adverse reaction, the patient was eventually discharged from clinic.       Scribe Attestation    I,:  Efrain Malcolm am acting as a scribe while in the presence of the attending physician.:       I,:  Angelo Alegre MD personally performed the services described in this documentation    as scribed in my presence.:

## 2023-09-11 NOTE — TELEPHONE ENCOUNTER
Pt called wanting to schedule a consult with Dr Karen Bethea, Dr Finn Duffy put in a referral to see Dr Karen Bethea. PRP Treatment    Please let me know when this patient can be scheduled.  Thank you

## 2023-09-14 ENCOUNTER — TELEPHONE (OUTPATIENT)
Age: 83
End: 2023-09-14

## 2023-09-15 ENCOUNTER — OFFICE VISIT (OUTPATIENT)
Age: 83
End: 2023-09-15

## 2023-09-15 ENCOUNTER — APPOINTMENT (OUTPATIENT)
Dept: RADIOLOGY | Facility: CLINIC | Age: 83
End: 2023-09-15
Payer: COMMERCIAL

## 2023-09-15 VITALS
DIASTOLIC BLOOD PRESSURE: 73 MMHG | BODY MASS INDEX: 20.02 KG/M2 | SYSTOLIC BLOOD PRESSURE: 122 MMHG | WEIGHT: 113 LBS | HEART RATE: 69 BPM | HEIGHT: 63 IN

## 2023-09-15 DIAGNOSIS — M25.572 PAIN, JOINT, ANKLE AND FOOT, LEFT: ICD-10-CM

## 2023-09-15 DIAGNOSIS — M25.572 PAIN, JOINT, ANKLE AND FOOT, LEFT: Primary | ICD-10-CM

## 2023-09-15 DIAGNOSIS — I70.223 REST PAIN OF BOTH LOWER EXTREMITIES DUE TO ATHEROSCLEROSIS (HCC): ICD-10-CM

## 2023-09-15 PROCEDURE — 73610 X-RAY EXAM OF ANKLE: CPT

## 2023-09-16 NOTE — PROGRESS NOTES
This patient was seen on 9/15/23. My role is Foot , Ankle, and Wound Specialist    ASSESSMENT     Diagnoses and all orders for this visit:    Pain, joint, ankle and foot, left  -     XR ankle 3+ vw left; Future    Rest pain of both lower extremities due to atherosclerosis (HCC)  -     VAS lower limb arterial duplex, complete bilateral; Future         Problem List Items Addressed This Visit    None  Visit Diagnoses     Pain, joint, ankle and foot, left    -  Primary    Relevant Orders    XR ankle 3+ vw left    Rest pain of both lower extremities due to atherosclerosis (HCC)        Relevant Orders    VAS lower limb arterial duplex, complete bilateral        PLAN  -Patient was educated regarding her condition  -Recommend compression socks given likely venous insufficiency  -Follow-up lower extremity arterial duplex studies has baseline values  -Return to clinic 6 weeks    Left ankle x-ray from 9/15/2023 reviewed independently: No acute osseous abnormality noted to suggest fracture. Slight decreased joint space at the medial gutter suggesting very early tibiotalar arthritis. SUBJECTIVE    Chief Complaint:  Ankle and calf pain     Patient ID: Tino Beck is a 82 y.o. female. 9/15/2023: Valeria Hodgkin is a pleasant 82-year-old female who presents today with bilateral calf/ankle pain. She states that her left is slightly worse than her right. She states that occasionally at night she wakes up with painful calfs, she has to stand up and walk around to make them feel better. She states that she has compression socks at home but does not wear them currently. The following portions of the patient's history were reviewed and updated as appropriate: allergies, current medications, past family history, past medical history, past social history, past surgical history and problem list.    Review of Systems   Constitutional: Negative. HENT: Negative. Respiratory: Negative. Cardiovascular: Negative. Gastrointestinal: Negative. Musculoskeletal: Positive for arthralgias. Skin: Negative. Neurological: Negative. OBJECTIVE      /73   Pulse 69   Ht 5' 3" (1.6 m)   Wt 51.3 kg (113 lb)   LMP  (LMP Unknown)   BMI 20.02 kg/m²        Physical Exam  Constitutional:       Appearance: Normal appearance. HENT:      Head: Normocephalic and atraumatic. Eyes:      General:         Right eye: No discharge. Left eye: No discharge. Cardiovascular:      Rate and Rhythm: Normal rate and regular rhythm. Pulses:           Dorsalis pedis pulses are 2+ on the right side and 2+ on the left side. Posterior tibial pulses are 2+ on the right side and 2+ on the left side. Pulmonary:      Effort: Pulmonary effort is normal.      Breath sounds: Normal breath sounds. Skin:     General: Skin is warm. Capillary Refill: Capillary refill takes less than 2 seconds. Neurological:      Sensory: Sensation is intact. No sensory deficit.          Vascular:  -Capillary refill time <2 sec b/l  -Digital hair growth: absent  -Skin temp: WNL    MSK:  -Pain on palpation negative  -No gross deformities noted  -MMT is 5/5 to all muscle compartments of the lower extremity  -Ankle dorsiflexion <10 degrees with knee extended and knee flexed b/l    Neuro:  -Light sensation intact bilaterally  -Protective sensation intact bilaterally with 10/10 points felt with Ying Mon monofilament    Derm:  -No lesions, abrasions, or open wounds noted  -No noted interdigital maceration, peeling, malodor  -No callus formation noted on exam  -I note hyperpigmentation of bilateral legs suggestive of venous stasis

## 2023-09-18 ENCOUNTER — COSMETIC (OUTPATIENT)
Dept: DERMATOLOGY | Facility: CLINIC | Age: 83
End: 2023-09-18

## 2023-09-18 DIAGNOSIS — Z41.1 ENCOUNTER FOR COSMETIC PROCEDURE: Primary | ICD-10-CM

## 2023-09-18 DIAGNOSIS — L43.9 LICHEN PLANUS: ICD-10-CM

## 2023-09-18 PROCEDURE — PRP3 PLATELET RICH PLASMA 3 TX: Performed by: STUDENT IN AN ORGANIZED HEALTH CARE EDUCATION/TRAINING PROGRAM

## 2023-09-18 NOTE — PATIENT INSTRUCTIONS
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 Educated that patient may trial over the counter 5% minoxidil or we can prescribe compounded version ot apply in place of oral medication if desired. Deferred change for now. Educated patient that the first sign of improvement of telogen effluvium is cessation of shedding followed by regrowth and that this may take many months. Discussed gentle hair care to minimize breakage. Brush hair when dry, wet hair is more fragile. Minimize processing hair, curling, coloring as much as possible. Discussed can trial topical coconut oil as this can penetrate hair shaft. Will see patient back for PRP monthly for 3-6 months, to cont to be managed medically by Dr. Anton Alford.
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
## 2023-09-18 NOTE — PROGRESS NOTES
[facility]     Patient Name: Flores Lopes  Encounter Date: 09/18/23     Have you been cared for by a [facility] Dermatologist in the last 3 years and, if so, which description applies to you? {Dermclinicnote_seeninlast3years:02665}    • Whom besides the patient is providing clinical information about today's encounter? o {DERM Element #2_Assessment Requiring an Independent Historian (Amount or Complexity of Data to be Reviewed and Analyzed):36190}    Physical Exam and Assessment/Plan by Diagnosis:      ANDROGENETIC ALOPECIA    Physical Exam:    Well appearing, in no acute distress. Well nourishes, well developed. Alert and oriented. A focused skin exam was performed including scalp and hair. The exam was negative except as noted below:     • Scalp:   o ***Thinning over the vertex scalp ***frontal scalp  o Negative hair pull test  o No perifollicular erythema or scale  o Follicular orifices in tact  o Hairline:  o Eyebrows and Eyelashes In tact  • Pertinent Positives:  • Pertinent Negatives:    History of Present Condition:      Duration: ***  Shedding/Decreased density?: ***  Previous treatments: ***  Current treatments: ***  Shampoo frequency: ***  Styling products: ***  Heat styling? ***  Hair color? ***  Relaxers/Perms? ***  Extensions/Weave? ***  Premenopausal? ***  Family planning/pregnancy? ***              Method of birth control (if applicable)? ***  Hx of breast cancer? ***              History of racing heart rate/arrhythmia? ***              History of low blood pressure? ***  Thyroid disease? ***  Anemia? ***  Vitamin D Deficiency? ***  Family history of hair loss? ***  Dietary Restrictions? ***  New medications ? ***  Recent stressors: ***  Additional details? ***      FEMALE PATIENT Assessment and Plan:  - History and clinical exam consistent with androgenetic hair loss.    - Discussed therapeutic ladder, including minoxidil, low level laser therapy, spironolactone, finasteride, PRP, and hair transplantation  - Discussed 5% minoxidil foam qHS. Counseled patient that they may see some increased shedding within the first few weeks of treatment, which is a normal side effect and will resolve spontaneously. They may also see mild facial hypertrichosis. Educated that treatment should be continuous to maintain efficacy  - Discussed PO minoxidil 1.25 mg daily which  has been shown to improve hair growth at low dose. Educated on side effects, including hypertrichosis, hypotension, arrhythmia, lower extremity edema. Educated that we could titrate up to 2.5 mg daily with increased risk of hypertrichosis  - Discussed PO finasteride 5 mg. No personal or family history of breast cancer. Discussed generally well tolerated in post-menopausal women without side effects. Discussed that this can be used in women who are pre-menopausal if they are on OCP or similarly effective method of contraception.   - Discussed topical finasteride/minoxidil. Educated that topical formulations of finasteride are also available and have been shown to be as effective as 1 mg PO finasteride daily in the literature (PMID: 75274190), with decreased risk of side effects and possible synergistic effects when used in combination with topical minoxidil. Discussed [address] compounded medication with Finasteride/Minoxidil for $35.  - Discussed spironolactone 100 mg QHS. Educated on sie effects including menstrual irregularity, breast tenderness, headaches, GI upset, K+ retention, palpitations, teratogenicity. No history of kidney/liver dysfunction, no strong family history of breast cancer, not currently pregnant or planning for pregnancy  - Discussed additional options of light laser therapy; recommended hairmax brand laser band (can be purchased for about $800), which is thought to increase delivery of nutrients and restore the natural hair growth cycle, without notable side effects.  Educated that studies were largely in patients who had active hair loss within the past 12 mos. Further discussed PRP therapy, educating that a recent study demonstrated that approximately 71% of patients with androgenetic alopecia respond to PRP treatments and that treatment consists of monthly PRP injections for 6 months followed by maintenance treatments every 3-6 months. - If patient would like to pursue supplements, recommended nutrafol or viviscal  - Educated patient that regrowth may take many months.     - Patient to start ***  - Will see patient back in 6 mos to assess progress

## 2023-10-16 ENCOUNTER — COSMETIC (OUTPATIENT)
Dept: DERMATOLOGY | Facility: CLINIC | Age: 83
End: 2023-10-16

## 2023-10-16 DIAGNOSIS — L43.9 LICHEN PLANUS: ICD-10-CM

## 2023-10-16 DIAGNOSIS — L67.8 BRITTLE HAIR: Primary | ICD-10-CM

## 2023-10-16 RX ORDER — BENZONATATE 100 MG/1
100 CAPSULE ORAL 2 TIMES DAILY PRN
COMMUNITY
Start: 2023-09-24

## 2023-10-16 NOTE — PATIENT INSTRUCTIONS
There are a few medical causes of brittle hair like malnutrition and hormone imbalances, but the most common causes of brittle hair are things like UV rays, chlorine and other chemicals as well as heat-styling. Please try and follow the gentle hair care recommendations noted below as much as possible. To moisturize the hair shaft, you can try Olaplex No. 3 "Hair Perfector". You may also try leave in treatments with coconut oil. There is a substance called linoleic acid in coconut oil that is small enough to get into the hair shaft. In a recent 2019 Journals of Drugs in Dermatology Systematic Review of the literature that looked at treatments with cocount, castor or argan oils, coconut oil was shown to treat brittle hair clinically, but with limited impact on hair growth. Castor oil was also shown to somewhat improve hair quality by increasing hair luster but Argan oil did not have any significant evidence supporting its use to improve hair growth or quality. GENTLE HAIR CARE RECOMMENDATIONS    Dry hair, breakage and hair loss is a common complaint. The way that you take care of your hair can go a long way to help improve the overall condition of your hair. Hair care includes washing, conditioning and styling your hair. ?  Shampoo:  Use a moisturizing shampoo designed for dry or damaged hair. Some people find that shampoos labeled “low-sulfate” or “sulfate-free” are less drying than standard shampoos. Apply shampoo directly to the scalp and gently massage--do not “scrub” your hair   Consider shampooing less frequently. Dry shampoos are ok. Conditioner:  Use conditioner every time you wash your hair   Conditioners with silicone or dimethicone can coat the hair and provide additional moisture and protection   Consider using a leave-in conditioner     Styling: If your hair tangles easily, try detangling with a wide tooth comb while leave-in conditioner is in your hair.  Detangle in small sections, working from the ends of the hair toward the scalp. Wet hair breaks more easily  Heat styling tools (including curling irons, flat irons, blow dryers, etc.), can damage the hair and cause increased breakage. Limit use of heat styling tools to once weekly or less   Avoid hairstyles that place a lot of tension on the hair and scalp, including tight ryan, ponytails, weaves and heavy extensions. Tension on the scalp can cause scarring hair loss which is permanent  If you wear weaves and extensions: get ones that are light and do not pull. Get weaves and hair extensions only from salons that specializes in them. Wear a professional weave or hair extension for 2-3 months at most.      Chemicals:  Try to minimize use of harsh chemicals. All chemical relaxers, bleaches damage the hair shaft, making hair more fragile. These chemicals also remove the outer protective layer of the hair shaft, break chemical bonds, leading to weaker hair that is more brittle and dry, and has less shine. ?  Color:  Similar to chemical relaxers, permanent hair color changes the chemical composition of the hair leading to weaker hair. Repeated use can lead to hair breakage, split ends, undesirable hair feel, and dull appearing hair. Semi- or yasmine-permanent color and temporary colorants have less negative effects. Ammonia-free hair color is preferred. ? Drying:   Hair gels, sprays, and other styling agents mold the hair and can cause breakage especially when hair is combed, brushed or otherwise manipulated with the product(s) in the hair. Hair products that contain “isopropyl alcohol”, “SD alcohol 40” can be very drying. Products with “cetyl” or “stearyl” alcohol do not dry hair out.

## 2023-10-16 NOTE — PROGRESS NOTES
[facility] Note     Patient Name: Shima Meadows  Encounter Date: 10/16/23     Have you been cared for by a [facility] Dermatologist in the last 3 years and, if so, which description applies to you? Yes. I have been here within the last 3 years, and my medical history has NOT changed since that time. I am FEMALE/of child-bearing potential.    REVIEW OF SYSTEMS:  Have you recently had or currently have any of the following? No changes in my recent health. PAST MEDICAL HISTORY:  Have you personally ever had or currently have any of the following? If "YES," then please provide more detail. No changes in my medical history. FAMILY HISTORY:  Any "first degree relatives" (parent, brother, sister, or child) with the following? No changes in my family's known health. PATIENT EXPERIENCE:    Do you want the Dermatologist to perform a COMPLETE skin exam today including a clinical examination under the "bra and underwear" areas? NO  If necessary, do we have your permission to call and leave a detailed message on your Preferred Phone number that includes your specific medical information?   Yes      Allergies   Allergen Reactions    Albuterol Other (See Comments)     Atrial fibrillation    Dapsone Facial Swelling    Epinephrine     Mupirocin     Seasonal Ic [Cholestatin] Itching    Cephalosporins Rash      Current Outpatient Medications:     b complex vitamins capsule, Take 1 capsule by mouth daily, Disp: , Rfl:     benzonatate (TESSALON PERLES) 100 mg capsule, 100 mg 2 (two) times a day as needed for cough, Disp: , Rfl:     calcipotriene (DOVONEX) 0.005 % topical solution, APPLY TO AFFECTED AREA(S) TWO TIMES A DAY (GENERIC DOVONEX), Disp: 60 mL, Rfl: 2    Cholecalciferol (VITAMIN D3 PO), Take by mouth, Disp: , Rfl:     clobetasol (TEMOVATE) 0.05 % ointment, , Disp: , Rfl:     cyanocobalamin (VITAMIN B-12) 100 mcg tablet, Take by mouth daily, Disp: , Rfl:     cycloSPORINE (RESTASIS) 0.05 % ophthalmic emulsion, 1 drop by Tube route 2 (two) times a day, Disp: , Rfl:     dutasteride (AVODART) 0.5 mg capsule, Take 1 capsule (0.5 mg total) by mouth daily, Disp: 90 capsule, Rfl: 2    estradiol (ESTRACE) 0.1 mg/g vaginal cream, Insert into the vagina 2 (two) times a week, Disp: , Rfl:     folic acid (FOLVITE) 1 mg tablet, TAKE ONE TABLET BY MOUTH EVERY DAY WHILE ON METHOTREXATE TO PREVENT SIDE EFFECTS (GENERIC FOR FOLVITE), Disp: 90 tablet, Rfl: 1    Ivermectin (Soolantra) 1 % CREA, Apply topically 1-2 times a day to face area as needed for rosacea., Disp: 45 g, Rfl: 5    Lutein 40 MG CAPS, Take by mouth daily  , Disp: , Rfl:     Magnesium 500 MG CAPS, Take by mouth, Disp: , Rfl:     Magnesium Gluconate 550 MG TABS, Take 30 mg by mouth 2 (two) times a day  , Disp: , Rfl:     metoprolol succinate (TOPROL-XL) 25 mg 24 hr tablet, Take 25 mg by mouth in the morning., Disp: , Rfl:     minoxidil (LONITEN) 2.5 mg tablet, TAKE ONE-HALF TABLET BY MOUTH EVERY DAY -GENERIC FOR LONITEN, Disp: 90 tablet, Rfl: 2    spironolactone (ALDACTONE) 25 mg tablet, Take 25 mg by mouth daily, Disp: , Rfl:     Eliquis 2.5 MG, , Disp: , Rfl:     meloxicam (MOBIC) 15 mg tablet, , Disp: , Rfl:     valACYclovir (VALTREX) 1,000 mg tablet, Take 1 tablet (1,000 mg total) by mouth 2 (two) times a day for 3 days (Patient not taking: Reported on 10/16/2023), Disp: 6 tablet, Rfl: 0          Whom besides the patient is providing clinical information about today's encounter? NO ADDITIONAL HISTORIAN (patient alone provided history)    Physical Exam and Assessment/Plan by Diagnosis:      COSMETIC NOTE    Platelet Rich Plasma Treatment for Telogen Effuvium, Hx of LP    Screening questions:    Are you currently undergoing chemotherapy? No  Are you currently on oral steroids? No  Have you had steroid injections in the scalp in the last 1 month? No  Are you on blood thinners (aspirin, plavix, warfarin, xarelto etc)?  No  Do you have/or had liver disease or abnormal platelet function? No  What are you allergic to? Albuterol, Dapsone, Mupirocin, Cephalosporins, Epinephrine,  Seasonal Ic, Cholestatin     The patient is here as for PRP treatment number: 2    Other treatments:   Minoxidil 2.5 mg taking 0.5 tablet once daily  Dutasteride 0.5 mg taking 1 tablet once daily  Topical calcipotriene   PRP monthly, s/p 1 treatment  Previously had been getting ILK to scalp    Interim Hx: Pt has been using coconut oil to hair given brittleness that was new at last visit. New hair growth (~ 1 inch) with normal texture but patient notes she is having shedding again when brushing and in shower. Is taking both multivitamin and Viviscal. Denies any dietary changes. Has had LAKISHA check in past and wnl- has Rheumatologist she follows with. Plan: Labs ordered Ferritin, Vitamin D, Zinc - will call with results. Visible short terminal hair regrowth on frontal scalp - bitemporally. Whitt hair on vertex scalp with visible kinky texture at ends of hair. 2 photos taken today to document. Discussed stopping multivitamin as she is taking Viviscal and excess vitamins can also cause change in texture of hair. This is an elective procedure performed as a cosmetic procedure for hair regrowth. The patient verbally consented to the treatment. The Left antecubital fossa was cleansed with ETOH. A catheter was used to extract approximately 21cc's of the patient's blood. The blood was maintained in a sterile tube which was then placed in a sterile Eclipse centrifuge. A balance tube with an equivalent amount of water was placed to balance the centrifuge. The platelet rich plasma was collected using a sterile needle. A total of 16 cc's of PRP was collected in individual 1-3 mL syringes. The frontal and vertex scalp were injected. Cold air was used for anethesia during injections.  The designated areas of the scalp were cleansed with ETOH and injected with 3 mL of PRP per injection in a grid like fashion using sterile 27 gauge needles. Pinpoint hemorrhage from injected areas and erythema were noted immediately post-injection. The patient tolerated the procedure well. [facility] BCT-3 (20mL)  RK-BCT-3-20  LOT [id number]  EXP 2025-03-24    THIS IS A COSMETIC PATIENT WHO PAID OUT OF POCKET. TOTAL COST $1200.00. PAID 1200.00 for 3 treatment(s). This is treatment number 2 of treatment number 3. DO NOT BILL.

## 2023-11-20 ENCOUNTER — COSMETIC (OUTPATIENT)
Dept: DERMATOLOGY | Facility: CLINIC | Age: 83
End: 2023-11-20

## 2023-11-20 DIAGNOSIS — L67.8 BRITTLE HAIR: Primary | ICD-10-CM

## 2023-11-20 DIAGNOSIS — L66.1 LICHEN PLANOPILARIS: ICD-10-CM

## 2023-11-20 LAB
25(OH)D3+25(OH)D2 SERPL-MCNC: 61.6 NG/ML (ref 30–100)
FERRITIN SERPL-MCNC: 113 NG/ML (ref 15–150)
ZINC SERPL-MCNC: 88 UG/DL (ref 44–115)

## 2023-11-20 PROCEDURE — BUNDLE PR BUNDLE: Performed by: STUDENT IN AN ORGANIZED HEALTH CARE EDUCATION/TRAINING PROGRAM

## 2023-11-20 NOTE — PATIENT INSTRUCTIONS
Platelet Rich Plasma (PRP) Hair Restoration is aimed at stimulating new, healthy hair growth. This treatment involves the injection of a concentrated solution of platelets, derived from your own blood into areas of thinning hair or hair loss. Please read below for the risks associated with the procedure and pre & post treatment instructions. Potential Risks  · Bruising, redness, swelling, itching, or pain at injection sites  · Minor discomfort from blood draw  · Temporary headache  · Hair loss (temporary) in the existing hair. This is often termed ‘shock loss.’  · Nerve damage and infection from injections (very rare)      Pre-care Instructions:  · Avoid NSAID use 1 week prior to procedure (Aleve, Ibuprofen, Naproxen, Advil). · Avoid coloring hair 2 weeks prior to the procedure. · Please prepare for the procedure by eating a full meal at least 2-3 hours beforehand. · Drink plenty of fluids the day of procedure. · The procedure typically takes 30 - 45 minutes. Post-care Instructions:  · Avoid washing your hair for 24 hours. · Avoid NSAID use 1 week after the treatment. Use Tylenol or ice following the procedure if needed or pain associated with procedure or from other sources. Your scalp may be sore for 24 - 48 hours. · Avoid coloring hair 2 weeks after treatment. Expectations:  · For best results 3 - 6 monthly treatments may be required. · Recommend a maintenance treatment every 6-12 months following initial course of treatment  · New hair growth is typically noticed 3 months after a treatment. Since treatment effects are cumulative, maximum effects occur 3 - 6 months following 3rd treatment. · Treatment effects, with potential for continued improvement, persist 6 - 12 months after last treatment. · We cannot guarantee how much hair growth any individual will experience given results are variable.

## 2023-11-20 NOTE — PROGRESS NOTES
[facility] Dermatology Clinic Note     Patient Name: Clarisa Pedersen  Encounter Date: 11/20/2023     Have you been cared for by a [facility] Dermatologist in the last 3 years and, if so, which description applies to you? Yes. I have been here within the last 3 years, and my medical history has NOT changed since that time. I am FEMALE/of child-bearing potential.    REVIEW OF SYSTEMS:  Have you recently had or currently have any of the following? No changes in my recent health. PAST MEDICAL HISTORY:  Have you personally ever had or currently have any of the following? If "YES," then please provide more detail. No changes in my medical history. HISTORY OF IMMUNOSUPPRESSION: Do you have a history of any of the following:  Systemic Immunosuppression such as Diabetes, Biologic or Immunotherapy, Chemotherapy, Organ Transplantation, Bone Marrow Transplantation? No     Answering "YES" requires the addition of the dotphrase "IMMUNOSUPPRESSED" as the first diagnosis of the patient's visit. FAMILY HISTORY:  Any "first degree relatives" (parent, brother, sister, or child) with the following? No changes in my family's known health. PATIENT EXPERIENCE:    Do you want the Dermatologist to perform a COMPLETE skin exam today including a clinical examination under the "bra and underwear" areas? NO  If necessary, do we have your permission to call and leave a detailed message on your Preferred Phone number that includes your specific medical information?   Yes      Allergies   Allergen Reactions    Albuterol Other (See Comments)     Atrial fibrillation    Dapsone Facial Swelling    Epinephrine     Mupirocin     Seasonal Ic [Cholestatin] Itching    Cephalosporins Rash      Current Outpatient Medications:     b complex vitamins capsule, Take 1 capsule by mouth daily, Disp: , Rfl:     benzonatate (TESSALON PERLES) 100 mg capsule, 100 mg 2 (two) times a day as needed for cough, Disp: , Rfl:     calcipotriene (DOVONEX) 0.005 % topical solution, APPLY TO AFFECTED AREA(S) TWO TIMES A DAY (Mayuri Poag), Disp: 60 mL, Rfl: 2    Cholecalciferol (VITAMIN D3 PO), Take by mouth, Disp: , Rfl:     clobetasol (TEMOVATE) 0.05 % ointment, , Disp: , Rfl:     cyanocobalamin (VITAMIN B-12) 100 mcg tablet, Take by mouth daily, Disp: , Rfl:     cycloSPORINE (RESTASIS) 0.05 % ophthalmic emulsion, 1 drop by Tube route 2 (two) times a day, Disp: , Rfl:     dutasteride (AVODART) 0.5 mg capsule, Take 1 capsule (0.5 mg total) by mouth daily, Disp: 90 capsule, Rfl: 2    Eliquis 2.5 MG, , Disp: , Rfl:     estradiol (ESTRACE) 0.1 mg/g vaginal cream, Insert into the vagina 2 (two) times a week, Disp: , Rfl:     folic acid (FOLVITE) 1 mg tablet, TAKE ONE TABLET BY MOUTH EVERY DAY WHILE ON METHOTREXATE TO PREVENT SIDE EFFECTS (GENERIC FOR FOLVITE), Disp: 90 tablet, Rfl: 1    Ivermectin (Soolantra) 1 % CREA, Apply topically 1-2 times a day to face area as needed for rosacea., Disp: 45 g, Rfl: 5    Lutein 40 MG CAPS, Take by mouth daily  , Disp: , Rfl:     Magnesium 500 MG CAPS, Take by mouth, Disp: , Rfl:     Magnesium Gluconate 550 MG TABS, Take 30 mg by mouth 2 (two) times a day  , Disp: , Rfl:     meloxicam (MOBIC) 15 mg tablet, , Disp: , Rfl:     metoprolol succinate (TOPROL-XL) 25 mg 24 hr tablet, Take 25 mg by mouth in the morning., Disp: , Rfl:     minoxidil (LONITEN) 2.5 mg tablet, TAKE ONE-HALF TABLET BY MOUTH EVERY DAY -GENERIC FOR LONITEN, Disp: 90 tablet, Rfl: 2    spironolactone (ALDACTONE) 25 mg tablet, Take 25 mg by mouth daily, Disp: , Rfl:     valACYclovir (VALTREX) 1,000 mg tablet, Take 1 tablet (1,000 mg total) by mouth 2 (two) times a day for 3 days (Patient not taking: Reported on 10/16/2023), Disp: 6 tablet, Rfl: 0          Whom besides the patient is providing clinical information about today's encounter?    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 No  Are you currently on oral steroids? No  Have you had steroid injections in the scalp in the last 1 month? No  Are you on blood thinners (aspirin, plavix, warfarin, xarelto etc)? No  Do you have/or had liver disease or abnormal platelet function? No  What are you allergic to? Albuterol, Dapsone, Mupirocin, Cephalosporins, Epinephrine,  Seasonal Ic, Cholestatin     The patient is here as for PRP treatment number: 3    Other treatments:   Minoxidil 2.5 mg taking 0.5 tablet once daily  Dutasteride 0.5 mg taking 1 tablet once daily  Topical calcipotriene   PRP monthly, s/p 2 treatments  Previously had been getting ILK to scalp    This is an elective procedure performed as a cosmetic procedure for hair regrowth. Discussed steroid injections can be done 3-4 weeks in between PRP injections. The patient verbally consented to the treatment. The Right antecubital fossa was cleansed with ETOH. A catheter was used to extract approximately 21 cc's of the patient's blood. The blood was maintained in a sterile tube which was then placed in a sterile Eclipse centrifuge. A balance tube with an equivalent amount of water was placed to balance the centrifuge. The platelet rich plasma was collected using a sterile needle. A total of 13 cc's of PRP was collected in individual 1-3 mL syringes. The frontal and vertex scalp were injected. Cold air was used for anethesia during injections. The designated areas of the scalp were cleansed with ETOH and injected with 3 mL of PRP per injection in a grid like fashion using sterile 27 gauge needles. Pinpoint hemorrhage from injected areas and erythema were noted immediately post-injection. The patient tolerated the procedure well. THIS IS A COSMETIC PATIENT WHO PAID OUT OF POCKET. TOTAL COST $1,200. PAID 1,200 for 3 treatment(s). This is treatment number 3.   DO NOT BILL.        RegenKit- BCT-3 (20 mL)   REF: [id number]  LOT: [id number]  Exp: 2025-03-24    Plan to f/up in 3 months for hair loss, breakage improved with bond strengthening treatments and new hair growth on scalp is improved    Scribe Attestation      I,:  Rogerio Evangelista am acting as a scribe while in the presence of the attending physician.:       I,:  Judy Spence MD personally performed the services described in this documentation    as scribed in my presence.:

## 2023-11-29 ENCOUNTER — TELEPHONE (OUTPATIENT)
Age: 83
End: 2023-11-29

## 2023-11-29 NOTE — TELEPHONE ENCOUNTER
Spoke with patient advised would run message mansi Stuart in reference to her having to reschedule her appointment and to see if we can get her in any sooner than [facility].

## 2023-11-29 NOTE — TELEPHONE ENCOUNTER
Patient called upset because she received a call that they were canceling her apt for 12/19. The patient said that she is suppose to get injections every two months. Her last injection was 9/11. She had a scheduled apt on 11/7 that was canceled by the office and then she had an apt scheduled  12/19 that just got canceled and he next apt is 1/9 which will be 4 months since her last injection. She needs to be seen now for the 2 month injection which at this point would already be almost 3 months. I advised Patient I would contact Dr. Cisneros office and let them know to see if there is anything they can do to get her in for her injections.

## 2024-01-08 ENCOUNTER — TELEPHONE (OUTPATIENT)
Dept: DERMATOLOGY | Facility: CLINIC | Age: 84
End: 2024-01-08

## 2024-01-09 DIAGNOSIS — L63.9 ALOPECIA AREATA: ICD-10-CM

## 2024-01-09 NOTE — TELEPHONE ENCOUNTER
Pt seen in Sept. Of 2023. Ongoing treatment Please approve/deny refill request for the Minoxidil 2.5 mg tabs for alopecia.

## 2024-01-09 NOTE — TELEPHONE ENCOUNTER
Reason for call:   [x] Refill   [] Prior Auth  [] Other:     Office:   [] PCP/Provider -   [x] Specialty/Provider - Derm/Jessica Fernandez     Medication: minoxidil (LONITEN) 2.5 mg tablet     Dose/Frequency: TAKE ONE-HALF TABLET BY MOUTH EVERY DAY     Quantity: 90    Pharmacy: [id number] - [place] [id number]  22    Does the patient have enough for 3 days?   [] Yes   [x] No - Send as HP to POD

## 2024-01-10 NOTE — TELEPHONE ENCOUNTER
Patient called again as she is out of her meds. I advise that our staff and doctors are aware and in the process of getting this signed and over to her pharmacy but that I will send a note letting them know she called again.

## 2024-01-11 RX ORDER — MINOXIDIL 2.5 MG/1
TABLET ORAL
Qty: 90 TABLET | Refills: 2 | Status: SHIPPED | OUTPATIENT
Start: 2024-01-11 | End: 2024-01-19 | Stop reason: SDUPTHER

## 2024-01-11 NOTE — TELEPHONE ENCOUNTER
Patient is frustrated her refill request hasn't been approved.     She called multiple times since Monday with no updates. Please fill minoxidil script over to   [id number] - [place] - [address] [address]     Once completed please notify patient.

## 2024-01-12 NOTE — TELEPHONE ENCOUNTER
Tried calling patient to let her know that her medication is ready for pickup at the pharmacy. No answer, voicemail left.

## 2024-01-12 NOTE — TELEPHONE ENCOUNTER
Spoke with Pharmacist and confirmed Minoxidil script was received. He says it has been filled and is ready for pickup.

## 2024-01-12 NOTE — TELEPHONE ENCOUNTER
Received call from patient stating she contacted her pharmacy and there is still no medication there for her.     I explained to the patient that the medication was approved and sent [facility] to [facility]. I also asked her to confirm if that is the correct pharmacy.     Patient confirmed that it is correct the correct pharmacy.    I also let her know Sharmin was going to call the pharmacy to confirm they received it and will be calling patient back to let her know

## 2024-01-17 ENCOUNTER — OFFICE VISIT (OUTPATIENT)
Dept: DERMATOLOGY | Facility: CLINIC | Age: 84
End: 2024-01-17
Payer: COMMERCIAL

## 2024-01-17 DIAGNOSIS — D48.5 NEOPLASM OF UNCERTAIN BEHAVIOR OF SKIN: Primary | ICD-10-CM

## 2024-01-17 PROCEDURE — 11102 TANGNTL BX SKIN SINGLE LES: CPT | Performed by: STUDENT IN AN ORGANIZED HEALTH CARE EDUCATION/TRAINING PROGRAM

## 2024-01-17 PROCEDURE — 99214 OFFICE O/P EST MOD 30 MIN: CPT | Performed by: STUDENT IN AN ORGANIZED HEALTH CARE EDUCATION/TRAINING PROGRAM

## 2024-01-17 PROCEDURE — 88305 TISSUE EXAM BY PATHOLOGIST: CPT | Performed by: STUDENT IN AN ORGANIZED HEALTH CARE EDUCATION/TRAINING PROGRAM

## 2024-01-17 NOTE — PROGRESS NOTES
"[facility] Dermatology Clinic Note     Patient Name: Cierra Mane  Encounter Date: 1/17/24     Have you been cared for by a [facility] Dermatologist in the last 3 years and, if so, which description applies to you?    Yes.  I have been here within the last 3 years, and my medical history has NOT changed since that time.  I am FEMALE/of child-bearing potential.    REVIEW OF SYSTEMS:  Have you recently had or currently have any of the following? No changes in my recent health.   PAST MEDICAL HISTORY:  Have you personally ever had or currently have any of the following?  If \"YES,\" then please provide more detail. No changes in my medical history.   HISTORY OF IMMUNOSUPPRESSION: Do you have a history of any of the following:  Systemic Immunosuppression such as Diabetes, Biologic or Immunotherapy, Chemotherapy, Organ Transplantation, Bone Marrow Transplantation?  No     Answering \"YES\" requires the addition of the dotphrase \"IMMUNOSUPPRESSED\" as the first diagnosis of the patient's visit.   FAMILY HISTORY:  Any \"first degree relatives\" (parent, brother, sister, or child) with the following?    No changes in my family's known health.   PATIENT EXPERIENCE:    Do you want the Dermatologist to perform a COMPLETE skin exam today including a clinical examination under the \"bra and underwear\" areas?  NO  If necessary, do we have your permission to call and leave a detailed message on your Preferred Phone number that includes your specific medical information?  Yes      Allergies   Allergen Reactions    Albuterol Other (See Comments)     Atrial fibrillation    Dapsone Facial Swelling    Epinephrine     Mupirocin     Seasonal Ic [Cholestatin] Itching    Cephalosporins Rash      Current Outpatient Medications:     b complex vitamins capsule, Take 1 capsule by mouth daily, Disp: , Rfl:     benzonatate (TESSALON PERLES) 100 mg capsule, 100 mg 2 (two) times a day as needed for cough, Disp: , Rfl:     calcipotriene (DOVONEX) 0.005 % " topical solution, APPLY TO AFFECTED AREA(S) TWO TIMES A DAY (GENERIC DOVONEX), Disp: 60 mL, Rfl: 2    Cholecalciferol (VITAMIN D3 PO), Take by mouth, Disp: , Rfl:     clobetasol (TEMOVATE) 0.05 % ointment, , Disp: , Rfl:     cyanocobalamin (VITAMIN B-12) 100 mcg tablet, Take by mouth daily, Disp: , Rfl:     cycloSPORINE (RESTASIS) 0.05 % ophthalmic emulsion, 1 drop by Tube route 2 (two) times a day, Disp: , Rfl:     dutasteride (AVODART) 0.5 mg capsule, Take 1 capsule (0.5 mg total) by mouth daily, Disp: 90 capsule, Rfl: 2    Eliquis 2.5 MG, , Disp: , Rfl:     estradiol (ESTRACE) 0.1 mg/g vaginal cream, Insert into the vagina 2 (two) times a week, Disp: , Rfl:     folic acid (FOLVITE) 1 mg tablet, TAKE ONE TABLET BY MOUTH EVERY DAY WHILE ON METHOTREXATE TO PREVENT SIDE EFFECTS (GENERIC FOR FOLVITE), Disp: 90 tablet, Rfl: 1    Ivermectin (Soolantra) 1 % CREA, Apply topically 1-2 times a day to face area as needed for rosacea., Disp: 45 g, Rfl: 5    Lutein 40 MG CAPS, Take by mouth daily  , Disp: , Rfl:     Magnesium 500 MG CAPS, Take by mouth, Disp: , Rfl:     Magnesium Gluconate 550 MG TABS, Take 30 mg by mouth 2 (two) times a day  , Disp: , Rfl:     meloxicam (MOBIC) 15 mg tablet, , Disp: , Rfl:     metoprolol succinate (TOPROL-XL) 25 mg 24 hr tablet, Take 25 mg by mouth in the morning., Disp: , Rfl:     minoxidil (LONITEN) 2.5 mg tablet, TAKE ONE-HALF TABLET BY MOUTH EVERY DAY -GENERIC FOR LONITEN, Disp: 90 tablet, Rfl: 2    spironolactone (ALDACTONE) 25 mg tablet, Take 25 mg by mouth daily, Disp: , Rfl:     valACYclovir (VALTREX) 1,000 mg tablet, Take 1 tablet (1,000 mg total) by mouth 2 (two) times a day for 3 days (Patient not taking: Reported on 10/16/2023), Disp: 6 tablet, Rfl: 0          Whom besides the patient is providing clinical information about today's encounter?   NO ADDITIONAL HISTORIAN (patient alone provided history)    Physical Exam and Assessment/Plan by Diagnosis:      NEOPLASM OF UNCERTAIN  "BEHAVIOR OF SKIN    Physical Exam:  (Anatomic Location); (Size and Morphological Description); (Differential Diagnosis):  Specimen A Right anti-helix; skin; shave biopsy; 1 cm x 0.8 cm eroded papule; ddx rule out basal cell carcinoma versus CNH  Pertinent Positives:  Pertinent Negatives:    Additional History of Present Condition:  patient states lesion has been present for about a month, bleeding/not healing.     Assessment and Plan:  I have discussed with the patient that a sample of skin via a \"skin biopsy” would be potentially helpful to further make a specific diagnosis under the microscope.  Based on a thorough discussion of this condition and the management approach to it (including a comprehensive discussion of the known risks, side effects and potential benefits of treatment), the patient (family) agrees to implement the following specific plan:    Procedure:  Skin Biopsy.  After a thorough discussion of treatment options and risk/benefits/alternatives (including but not limited to local pain, scarring, dyspigmentation, blistering, possible superinfection, and inability to confirm a diagnosis via histopathology), verbal and written consent were obtained and portion of the rash was biopsied for tissue sample.  See below for consent that was obtained from patient and subsequent Procedure Note.     PROCEDURE TANGENTIAL (SHAVE) BIOPSY NOTE:    Performing Physician:   Anatomic Location; Clinical Description with size (cm); Pre-Op Diagnosis:   Specimen A Right antihelix; skin; shave biopsy; 1 cm x 0.8 cm eroded papule; ddx rule out basal cell carcinoma versus CNH vs other  Post-op diagnosis: Same     Local anesthesia: 1% xylocaine with epi      Topical anesthesia: None    Hemostasis: Aluminum chloride       After obtaining informed consent  at which time there was a discussion about the purpose of biopsy  and low risks of infection and bleeding.  The area was prepped and draped in the usual fashion. " "Anesthesia was obtained with 1% lidocaine with epinephrine. A shave biopsy to an appropriate sampling depth was obtained by Shave (Dermablade or 15 blade) The resulting wound was covered with surgical ointment and bandaged appropriately.     The patient tolerated the procedure well without complications and was without signs of functional compromise.      Specimen has been sent for review by Dermatopathology.    Standard post-procedure care has been explained and has been included in written form within the patient's copy of Informed Consent.    INFORMED CONSENT DISCUSSION AND POST-OPERATIVE INSTRUCTIONS FOR PATIENT    I.  RATIONALE FOR PROCEDURE  I understand that a skin biopsy allows the Dermatologist to test a lesion or rash under the microscope to obtain a diagnosis.  It usually involves numbing the area with numbing medication and removing a small piece of skin; sometimes the area will be closed with sutures. In this specific procedure, sutures are not usually needed.  If any sutures are placed, then they are usually need to be removed in 2 weeks or less.    I understand that my Dermatologist recommends that a skin \"shave\" biopsy be performed today.  A local anesthetic, similar to the kind that a dentist uses when filling a cavity, will be injected with a very small needle into the skin area to be sampled.  The injected skin and tissue underneath \"will go to sleep” and become numb so no pain should be felt afterwards.  An instrument shaped like a tiny \"razor blade\" (shave biopsy instrument) will be used to cut a small piece of tissue and skin from the area so that a sample of tissue can be taken and examined more closely under the microscope.  A slight amount of bleeding will occur, but it will be stopped with direct pressure and a pressure bandage and any other appropriate methods.  I understands that a scar will form where the wound was created.  Surgical ointment will be applied to help protect the wound.  " "Sutures are not usually needed.    II.  RISKS AND POTENTIAL COMPLICATIONS   I understand the risks and potential complications of a skin biopsy include but are not limited to the following:  Bleeding  Infection  Pain  Scar/keloid  Skin discoloration  Incomplete Removal  Recurrence  Nerve Damage/Numbness/Loss of Function  Allergic Reaction to Anesthesia  Biopsies are diagnostic procedures and based on findings additional treatment or evaluation may be required  Loss or destruction of specimen resulting in no additional findings    My Dermatologist has explained to me the nature of the condition, the nature of the procedure, and the benefits to be reasonably expected compared with alternative approaches.  My Dermatologist has discussed the likelihood of major risks or complications of this procedure including the specific risks listed above, such as bleeding, infection, and scarring/keloid.  I understand that a scar is expected after this procedure.  I understand that my physician cannot predict if the scar will form a \"keloid,\" which extends beyond the borders of the wound that is created.  A keloid is a thick, painful, and bumpy scar.  A keloid can be difficult to treat, as it does not always respond well to therapy, which includes injecting cortisone directly into the keloid every few weeks.  While this usually reduces the pain and size of the scar, it does not eliminate it.      I understand that photographs may be taken before and after the procedure.  These will be maintained as part of the medical providers confidential records and may not be made available to me.  I further authorize the medical provider to use the photographs for teaching purposes or to illustrate scientific papers, books, or lectures if in his/her judgment, medical research, education, or science may benefit from its use.    I have had an opportunity to fully inquire about the risks and benefits of this procedure and its alternatives.   I " "have been given ample time and opportunity to ask questions and to seek a second opinion if I wished to do so.  I acknowledge that there have specifically been no guarantees as to the cosmetic results from the procedure.  I am aware that with any procedure there is always the possibility of an unexpected complication.    III. POST-PROCEDURAL CARE (WHAT YOU WILL NEED TO DO \"AFTER THE BIOPSY\" TO OPTIMIZE HEALING)    Keep the area clean and dry.  Try NOT to remove the bandage or get it wet for the first 24 hours.    Gently clean the area and apply surgical ointment (such as Vaseline petrolatum ointment, which is available \"over the counter\" and not a prescription) to the biopsy site for up to 2 weeks straight.  This acts to protect the wound from the outside world.      Sutures are not usually placed in this procedure.  If any sutures were placed, return for suture removal as instructed (generally 1 week for the face, 2 weeks for the body).      Take Acetaminophen (Tylenol) for discomfort, if no contraindications.  Ibuprofen or aspirin could make bleeding worse.    Call our office immediately for signs of infection: fever, chills, increased redness, warmth, tenderness, discomfort/pain, or pus or foul smell coming from the wound.    WHAT TO DO IF THERE IS ANY BLEEDING?  If a small amount of bleeding is noticed, place a clean cloth over the area and apply firm pressure for ten minutes.  Check the wound after 10 minutes of direct pressure.  If bleeding persists, try one more time for an additional 10 minutes of direct pressure on the area.  If the bleeding becomes heavier or does not stop after the second attempt, or if you have any other questions about this procedure, then please call your [facility]'s Dermatologist by calling [phone] (SKIN).     I hereby acknowledge that I have reviewed and verified the site with my Dermatologist and have requested and authorized my Dermatologist to proceed with the " procedure.    Scribe Attestation      I,:  Treasure Watkins MA am acting as a scribe while in the presence of the attending physician.:       I,:  Gloria Shoemaker MD personally performed the services described in this documentation    as scribed in my presence.:

## 2024-01-19 ENCOUNTER — OFFICE VISIT (OUTPATIENT)
Dept: DERMATOLOGY | Facility: CLINIC | Age: 84
End: 2024-01-19
Payer: COMMERCIAL

## 2024-01-19 VITALS — WEIGHT: 113 LBS | BODY MASS INDEX: 20.02 KG/M2 | HEIGHT: 63 IN

## 2024-01-19 DIAGNOSIS — L63.9 ALOPECIA AREATA: ICD-10-CM

## 2024-01-19 DIAGNOSIS — L66.1 LICHEN PLANOPILARIS: Primary | ICD-10-CM

## 2024-01-19 PROCEDURE — 99214 OFFICE O/P EST MOD 30 MIN: CPT | Performed by: DERMATOLOGY

## 2024-01-19 RX ORDER — MINOXIDIL 2.5 MG/1
TABLET ORAL
Qty: 90 TABLET | Refills: 2 | Status: SHIPPED | OUTPATIENT
Start: 2024-01-19

## 2024-01-19 RX ORDER — DUTASTERIDE 0.5 MG/1
0.5 CAPSULE, LIQUID FILLED ORAL DAILY
Qty: 90 CAPSULE | Refills: 2 | Status: SHIPPED | OUTPATIENT
Start: 2024-01-19

## 2024-01-19 RX ORDER — CLOBETASOL PROPIONATE 0.46 MG/ML
SOLUTION TOPICAL 2 TIMES DAILY
Qty: 50 ML | Refills: 0 | Status: CANCELLED | OUTPATIENT
Start: 2024-01-19

## 2024-01-19 NOTE — PROGRESS NOTES
"[facility] Note     Patient Name: Cierra Mane  Encounter Date: January 19, 2024     Have you been cared for by a [facility]ologist in the last 3 years and, if so, which description applies to you?    Yes.  I have been here within the last 3 years, and my medical history has NOT changed since that time.  I am FEMALE/of child-bearing potential.    REVIEW OF SYSTEMS:  Have you recently had or currently have any of the following? No changes in my recent health.   PAST MEDICAL HISTORY:  Have you personally ever had or currently have any of the following?  If \"YES,\" then please provide more detail. No changes in my medical history.   HISTORY OF IMMUNOSUPPRESSION: Do you have a history of any of the following:  Systemic Immunosuppression such as Diabetes, Biologic or Immunotherapy, Chemotherapy, Organ Transplantation, Bone Marrow Transplantation?  No     Answering \"YES\" requires the addition of the dotphrase \"IMMUNOSUPPRESSED\" as the first diagnosis of the patient's visit.   FAMILY HISTORY:  Any \"first degree relatives\" (parent, brother, sister, or child) with the following?    No changes in my family's known health.   PATIENT EXPERIENCE:    Do you want the Dermatologist to perform a COMPLETE skin exam today including a clinical examination under the \"bra and underwear\" areas?  NO  If necessary, do we have your permission to call and leave a detailed message on your Preferred Phone number that includes your specific medical information?  Yes      Allergies   Allergen Reactions    Albuterol Other (See Comments)     Atrial fibrillation    Dapsone Facial Swelling    Epinephrine     Mupirocin     Seasonal Ic [Cholestatin] Itching    Cephalosporins Rash      Current Outpatient Medications:     b complex vitamins capsule, Take 1 capsule by mouth daily, Disp: , Rfl:     calcipotriene (DOVONEX) 0.005 % topical solution, APPLY TO AFFECTED AREA(S) TWO TIMES A DAY (GENERIC DOVONEX), Disp: 60 mL, Rfl: 2    " Cholecalciferol (VITAMIN D3 PO), Take by mouth, Disp: , Rfl:     clobetasol (TEMOVATE) 0.05 % ointment, , Disp: , Rfl:     cyanocobalamin (VITAMIN B-12) 100 mcg tablet, Take by mouth daily, Disp: , Rfl:     cycloSPORINE (RESTASIS) 0.05 % ophthalmic emulsion, 1 drop by Tube route 2 (two) times a day, Disp: , Rfl:     dutasteride (AVODART) 0.5 mg capsule, Take 1 capsule (0.5 mg total) by mouth daily, Disp: 90 capsule, Rfl: 2    folic acid (FOLVITE) 1 mg tablet, TAKE ONE TABLET BY MOUTH EVERY DAY WHILE ON METHOTREXATE TO PREVENT SIDE EFFECTS (GENERIC FOR FOLVITE), Disp: 90 tablet, Rfl: 1    Ivermectin (Soolantra) 1 % CREA, Apply topically 1-2 times a day to face area as needed for rosacea., Disp: 45 g, Rfl: 5    Lutein 40 MG CAPS, Take by mouth daily  , Disp: , Rfl:     Magnesium 500 MG CAPS, Take by mouth, Disp: , Rfl:     Magnesium Gluconate 550 MG TABS, Take 30 mg by mouth 2 (two) times a day  , Disp: , Rfl:     metoprolol succinate (TOPROL-XL) 25 mg 24 hr tablet, Take 25 mg by mouth in the morning., Disp: , Rfl:     minoxidil (LONITEN) 2.5 mg tablet, TAKE ONE-HALF TABLET BY MOUTH EVERY DAY -GENERIC FOR LONITEN, Disp: 90 tablet, Rfl: 2    spironolactone (ALDACTONE) 25 mg tablet, Take 25 mg by mouth daily, Disp: , Rfl:     Eliquis 2.5 MG, , Disp: , Rfl:     estradiol (ESTRACE) 0.1 mg/g vaginal cream, Insert into the vagina 2 (two) times a week (Patient not taking: Reported on 1/19/2024), Disp: , Rfl:     meloxicam (MOBIC) 15 mg tablet, , Disp: , Rfl:     valACYclovir (VALTREX) 1,000 mg tablet, Take 1 tablet (1,000 mg total) by mouth 2 (two) times a day for 3 days (Patient not taking: Reported on 10/16/2023), Disp: 6 tablet, Rfl: 0          Whom besides the patient is providing clinical information about today's encounter?   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 Ariza in September. Pt states that the scalp feels okay but she wants the doctor to check properly since it has been awhile.    LICHEN PLANPILARIS - STABLE    Physical Exam:  Anatomic Location Affected:  Scalp  Morphological Description:  thin scaly erythematous plaques with perifollicular erythema  Pertinent Positives:  Pertinent Negatives:    Additional History of Present Condition:  currently using topical and oral Mindoxidil, Calcipotriene Solution,     Assessment and Plan:  Based on a thorough discussion of this condition and the management approach to it (including a comprehensive discussion of the known risks, side effects and potential benefits of treatment), the patient (family) agrees to implement the following specific plan:  Advised to stop topical Minoxidil topically if taking orally  Continue oral Minoxidil 1.25 mg daily and Dutasteride 0.5mg daily  Okay to continue PRP w/ for Telogen Effluvium if so desires  Recommend biopsy for definitive diagnosis of Lichen Planopilaris at follow up appointment    What is lichen planus?  Lichen planus is a chronic inflammatory skin condition affecting the skin and mucosal surfaces. There are several clinical types of lichen planus that share similar features on histopathology.  Cutaneous lichen planus  Mucosal lichen planus  Lichen planopilaris  Lichen planus of the nails  Lichen planus pigmentosus  Lichenoid drug eruption    Who gets lichen planus?  Lichen planus affects about one in one hundred people worldwide, mostly affecting adults over the age of 40 years. About half those affected have oral lichen planus, which is more common in women than in men. About 10% have lichen planus of the nails.    What causes lichen planus?  Lichen planus is a T cell-mediated autoimmune disorder, in which inflammatory cells attack an unknown protein within the skin and mucosal  keratinocytes.  Contributing factors to lichen planus may include:  Genetic predisposition  Physical and emotional stress  Injury to the skin; lichen planus often appears where the skin has been scratched or after surgery -- this is called the isomorphic response (koebnerisation)  Localized skin disease such as herpes zoster--isotopic response  Systemic viral infection, such as hepatitis C (which might modify self-antigens on the surface of basal keratinocytes)  Contact allergy, such as to metal fillings in oral lichen planus (rare)  Drugs; gold, quinine, quinidine and others can cause a lichenoid rash    A lichenoid inflammation is also notable in zxxmk-[id number]-elce disease, a complication of a bone marrow transplant.    What are the clinical features of lichen planus?  Lichen planus may cause a small number or many lesions on the skin and mucosal surfaces.    Cutaneous lichen planus  The usual presentation of the disease is classical lichen planus. Symptoms can range from none (uncommon) to intense itch.  Papules and polygonal plaques are shiny, flat-topped and firm on palpation.  The plaques are crossed by fine white lines called Adelfo striae.  Hypertrophic lichen planus can be scaly.  Bullous lichen planus is rare.  Size ranges from pinpoint to larger than a centimeter.  Distribution may be scattered, clustered, linear, annular or actinic (sun-exposed sites such as face, neck and backs of the hands).  Location can be anywhere, but most often front of the wrists, lower back, and ankles.  Colour depends on the patient's skin type. New papules and plaques often have a purple or violet hue, except on palms and soles where they are yellowish brown.  Plaques resolve after some months to leave greyish-brown post-inflammatory macules that can take a year or longer to fade.    Oral lichen planus  The mouth is often the only affected area. Oral lichen planus often involves the inside of the cheeks and the sides of the  tongue, but the gums and lips may also be involved. The most common patterns are:  Painless white streaks in a lacy or fern-like pattern  Painful and persistent erosions and ulcers (erosive lichen planus)  Diffuse redness and peeling of the gums (desquamative gingivitis)  Localized inflammation of the gums adjacent to amalgam fillings    Vulval lichen planus  Lichen planus may affect labia majora, labia minora and vaginal introitus. Presentation includes:  Painless white streaks in a lacy or fern-like pattern  Painful and persistent erosions and ulcers (erosive lichen planus )  Scarring, resulting in adhesions, resorption of labia minora and introital stenosis  Painful desquamative vaginitis, preventing intercourse and causing a mucky vaginal discharge. The eroded vagina may bleed easily on contact  Overlap with vulval lichen sclerosus, an inflammatory skin disorder that most commonly affects women over 50 years of age.    Penile lichen planus  Penile lichen planus usually presents with classical papules in a ring around the glans. White streaks and erosive lichen planus may occur but are less common.    Other mucosal sites  Erosive lichen planus uncommonly affects the lacrimal glands, eyelids, external ear canal, oesophagus, larynx, bladder and anus.    Lichen planopilaris  Lichen planopilaris presents as tiny red spiny follicular papules on the scalp or less often, elsewhere on the body. Rarely, blistering occurs in the lesions. Destruction of the hair follicles leads to permanently bald patches characterized by sparse “lonely hairs”.  Frontal fibrosing alopecia is a form of lichen planopilaris that affects the anterior scalp, forehead and eyebrows.  Pseudopelade of Brocq is probably a variant of lichen planus without inflammation or scaling. Areas of scarring without hair slowly appear, described as “like footprints in the snow”.    Lichen planus pigmentosus  Lichen planus pigmentosus describes ill-defined oval,  greyish brown marks on the face and neck or trunk and limbs without an inflammatory phase. It is a form of acquired dermal macular hyperpigmentation. It can be provoked by sun exposure, but it can also arise in sun-protected sites such as the armpits. It has diffuse, reticulate and diffuse patterns. Lichen planus pigmentosus is similar to erythema dyschromicum perstans and may be the same disease.  Lichen planus pigmentosus may rarely affect the lips, resulting in a patchy dark pigmentation on upper and lower lips.    Lichenoid drug eruption  Lichenoid drug eruption refers to a lichen planus-like rash caused by medications. Asymptomatic or itchy; pink, brown or purple; flat, slightly scaly patches most often arise on the trunk. The oral mucosa (oral lichenoid reaction) and other sites are also sometimes affected. Many drugs can rarely cause lichenoid eruptions. The most common are:  Gold  Hydroxychloroquine  Captopril    What are the complications of lichen planus?  Hypertrophic lichen planus may resemble squamous cell carcinoma. However, rarely, longstanding erosive lichen planus can result in true squamous cell carcinoma, most often in the mouth (oral cancer) or on the vulva (vulval cancer) or penis (penile cancer). This should be suspected if there is an enlarging nodule or an ulcer with thickened edges in these sites. Cancer is more common in smokers, those with a history of cancer in mucosal sites, and in those who carry sexually acquired and oncogenic human papillomavirus.  Cancer from other forms of lichen planus is rare.    How is lichen planus diagnosed?  In most cases, lichen planus is diagnosed by observing its clinical features. A biopsy is often recommended to confirm or make the diagnosis and to look for cancer. The histopathological signs are of a lichenoid tissue reaction affecting the epidermis.  Typical features include:  Irregularly thickened epidermis  Degenerative skin cells  Liquefaction  degeneration of the basal layer of the epidermis  Band of inflammatory cells just beneath the epidermis  Melanin (pigment) beneath the epidermis    Direct immunofluorescent staining may reveal deposits of immunoglobulins at the base of the epidermis.    Patch tests may be recommended for patients with oral lichen planus affecting the gums and who have amalgam fillings, to assess for contact allergy to thiomersal (a mercurial compound).    What is the treatment for lichen planus?  Treatment is not always necessary. Local treatments for symptomatic cutaneous or mucosal disease are:  Potent topical steroids  Topical calcineurin inhibitors, tacrolimus ointment and pimecrolimus cream  Topical retinoids  Intralesional steroid injections    Systemic treatment for widespread lichen planus or severe local disease often includes a 1 to 3-month course of oral prednisone, while commencing another agent from the following list:  Acitretin  Hydroxychloroquine  Methotrexate  Azathioprine  Mycophenolate mofetil  Phototherapy    In cases of oral lichen planus affecting the gums with contact allergy to mercury, the lichen planus may resolve on replacing the fillings with composite material. If the lichen planus is not due to mercury allergy, removing amalgam fillings is very unlikely to result in a cure.  Anecdotal success is reported from long courses of oral antibiotics and oral antifungal agents. Lichen planopilaris is reported to improve with pioglitazone.    What is the outlook for lichen planus?  Cutaneous lichen planus tends to clear within a couple of years in most people, but mucosal lichen planus is more likely to persist for a decade or longer. Spontaneous recovery is unpredictable, and lichen planus may recur at a later date. Scarring is permanent, including balding of the scalp.    Scribe Attestation      I,:  Gloria Cordero am acting as a scribe while in the presence of the attending physician.:       I,:  Luis Hillman,  MD personally performed the services described in this documentation    as scribed in my presence.:

## 2024-01-19 NOTE — PATIENT INSTRUCTIONS
LICHEN PLANUS    Physical Exam:  Anatomic Location Affected:  Scalp  Morphological Description:  thin scaly erythematous plaques with perifollicular erythema  Pertinent Positives:  Pertinent Negatives:    Additional History of Present Condition:  currently using topical and oral Mindoxidil, Calcipotriene Solution,     Assessment and Plan:  Based on a thorough discussion of this condition and the management approach to it (including a comprehensive discussion of the known risks, side effects and potential benefits of treatment), the patient (family) agrees to implement the following specific plan:  Add Clobetasol Solution BID to scalp x1 month then stop  Advised to stop topical Minoxidil  Continue oral Minoxidil 2.5mg daily and Dutasteride 0.5mg daily  Okay to continue PRP w/ for Telogen Effluvium if so desires  Recommend biopsy for definitive diagnosis of Lichen Planopilaris    What is lichen planus?  Lichen planus is a chronic inflammatory skin condition affecting the skin and mucosal surfaces. There are several clinical types of lichen planus that share similar features on histopathology.  Cutaneous lichen planus  Mucosal lichen planus  Lichen planopilaris  Lichen planus of the nails  Lichen planus pigmentosus  Lichenoid drug eruption    Who gets lichen planus?  Lichen planus affects about one in one hundred people worldwide, mostly affecting adults over the age of 40 years. About half those affected have oral lichen planus, which is more common in women than in men. About 10% have lichen planus of the nails.    What causes lichen planus?  Lichen planus is a T cell-mediated autoimmune disorder, in which inflammatory cells attack an unknown protein within the skin and mucosal keratinocytes.  Contributing factors to lichen planus may include:  Genetic predisposition  Physical and emotional stress  Injury to the skin; lichen planus often appears where the skin has been scratched or after surgery -- this is  called the isomorphic response (koebnerisation)  Localized skin disease such as herpes zoster--isotopic response  Systemic viral infection, such as hepatitis C (which might modify self-antigens on the surface of basal keratinocytes)  Contact allergy, such as to metal fillings in oral lichen planus (rare)  Drugs; gold, quinine, quinidine and others can cause a lichenoid rash    A lichenoid inflammation is also notable in [id number] disease, a complication of a bone marrow transplant.    What are the clinical features of lichen planus?  Lichen planus may cause a small number or many lesions on the skin and mucosal surfaces.    Cutaneous lichen planus  The usual presentation of the disease is classical lichen planus. Symptoms can range from none (uncommon) to intense itch.  Papules and polygonal plaques are shiny, flat-topped and firm on palpation.  The plaques are crossed by fine white lines called Adelfo striae.  Hypertrophic lichen planus can be scaly.  Bullous lichen planus is rare.  Size ranges from pinpoint to larger than a centimeter.  Distribution may be scattered, clustered, linear, annular or actinic (sun-exposed sites such as face, neck and backs of the hands).  Location can be anywhere, but most often front of the wrists, lower back, and ankles.  Colour depends on the patient's skin type. New papules and plaques often have a purple or violet hue, except on palms and soles where they are yellowish brown.  Plaques resolve after some months to leave greyish-brown post-inflammatory macules that can take a year or longer to fade.    Oral lichen planus  The mouth is often the only affected area. Oral lichen planus often involves the inside of the cheeks and the sides of the tongue, but the gums and lips may also be involved. The most common patterns are:  Painless white streaks in a lacy or fern-like pattern  Painful and persistent erosions and ulcers (erosive lichen planus)  Diffuse redness and peeling  of the gums (desquamative gingivitis)  Localized inflammation of the gums adjacent to amalgam fillings    Vulval lichen planus  Lichen planus may affect labia majora, labia minora and vaginal introitus. Presentation includes:  Painless white streaks in a lacy or fern-like pattern  Painful and persistent erosions and ulcers (erosive lichen planus )  Scarring, resulting in adhesions, resorption of labia minora and introital stenosis  Painful desquamative vaginitis, preventing intercourse and causing a mucky vaginal discharge. The eroded vagina may bleed easily on contact  Overlap with vulval lichen sclerosus, an inflammatory skin disorder that most commonly affects women over 50 years of age.    Penile lichen planus  Penile lichen planus usually presents with classical papules in a ring around the glans. White streaks and erosive lichen planus may occur but are less common.    Other mucosal sites  Erosive lichen planus uncommonly affects the lacrimal glands, eyelids, external ear canal, oesophagus, larynx, bladder and anus.    Lichen planopilaris  Lichen planopilaris presents as tiny red spiny follicular papules on the scalp or less often, elsewhere on the body. Rarely, blistering occurs in the lesions. Destruction of the hair follicles leads to permanently bald patches characterized by sparse “lonely hairs”.  Frontal fibrosing alopecia is a form of lichen planopilaris that affects the anterior scalp, forehead and eyebrows.  Pseudopelade of Brocq is probably a variant of lichen planus without inflammation or scaling. Areas of scarring without hair slowly appear, described as “like footprints in the snow”.    Lichen planus pigmentosus  Lichen planus pigmentosus describes ill-defined oval, greyish brown marks on the face and neck or trunk and limbs without an inflammatory phase. It is a form of acquired dermal macular hyperpigmentation. It can be provoked by sun exposure, but it can also arise in sun-protected sites  such as the armpits. It has diffuse, reticulate and diffuse patterns. Lichen planus pigmentosus is similar to erythema dyschromicum perstans and may be the same disease.  Lichen planus pigmentosus may rarely affect the lips, resulting in a patchy dark pigmentation on upper and lower lips.    Lichenoid drug eruption  Lichenoid drug eruption refers to a lichen planus-like rash caused by medications. Asymptomatic or itchy; pink, brown or purple; flat, slightly scaly patches most often arise on the trunk. The oral mucosa (oral lichenoid reaction) and other sites are also sometimes affected. Many drugs can rarely cause lichenoid eruptions. The most common are:  Gold  Hydroxychloroquine  Captopril    What are the complications of lichen planus?  Hypertrophic lichen planus may resemble squamous cell carcinoma. However, rarely, longstanding erosive lichen planus can result in true squamous cell carcinoma, most often in the mouth (oral cancer) or on the vulva (vulval cancer) or penis (penile cancer). This should be suspected if there is an enlarging nodule or an ulcer with thickened edges in these sites. Cancer is more common in smokers, those with a history of cancer in mucosal sites, and in those who carry sexually acquired and oncogenic human papillomavirus.  Cancer from other forms of lichen planus is rare.    How is lichen planus diagnosed?  In most cases, lichen planus is diagnosed by observing its clinical features. A biopsy is often recommended to confirm or make the diagnosis and to look for cancer. The histopathological signs are of a lichenoid tissue reaction affecting the epidermis.  Typical features include:  Irregularly thickened epidermis  Degenerative skin cells  Liquefaction degeneration of the basal layer of the epidermis  Band of inflammatory cells just beneath the epidermis  Melanin (pigment) beneath the epidermis    Direct immunofluorescent staining may reveal deposits of immunoglobulins at the base of  the epidermis.    Patch tests may be recommended for patients with oral lichen planus affecting the gums and who have amalgam fillings, to assess for contact allergy to thiomersal (a mercurial compound).    What is the treatment for lichen planus?  Treatment is not always necessary. Local treatments for symptomatic cutaneous or mucosal disease are:  Potent topical steroids  Topical calcineurin inhibitors, tacrolimus ointment and pimecrolimus cream  Topical retinoids  Intralesional steroid injections    Systemic treatment for widespread lichen planus or severe local disease often includes a 1 to 3-month course of oral prednisone, while commencing another agent from the following list:  Acitretin  Hydroxychloroquine  Methotrexate  Azathioprine  Mycophenolate mofetil  Phototherapy    In cases of oral lichen planus affecting the gums with contact allergy to mercury, the lichen planus may resolve on replacing the fillings with composite material. If the lichen planus is not due to mercury allergy, removing amalgam fillings is very unlikely to result in a cure.  Anecdotal success is reported from long courses of oral antibiotics and oral antifungal agents. Lichen planopilaris is reported to improve with pioglitazone.    What is the outlook for lichen planus?  Cutaneous lichen planus tends to clear within a couple of years in most people, but mucosal lichen planus is more likely to persist for a decade or longer. Spontaneous recovery is unpredictable, and lichen planus may recur at a later date. Scarring is permanent, including balding of the scalp.

## 2024-01-21 ENCOUNTER — PATIENT MESSAGE (OUTPATIENT)
Age: 84
End: 2024-01-21

## 2024-01-21 DIAGNOSIS — L66.1 LICHEN PLANOPILARIS: Primary | ICD-10-CM

## 2024-01-22 PROCEDURE — 88305 TISSUE EXAM BY PATHOLOGIST: CPT | Performed by: STUDENT IN AN ORGANIZED HEALTH CARE EDUCATION/TRAINING PROGRAM

## 2024-01-24 ENCOUNTER — COSMETIC (OUTPATIENT)
Dept: DERMATOLOGY | Facility: CLINIC | Age: 84
End: 2024-01-24

## 2024-01-24 ENCOUNTER — TELEPHONE (OUTPATIENT)
Dept: DERMATOLOGY | Facility: CLINIC | Age: 84
End: 2024-01-24

## 2024-01-24 DIAGNOSIS — Z41.1 ENCOUNTER FOR COSMETIC PROCEDURE: Primary | ICD-10-CM

## 2024-01-24 PROCEDURE — JUVID1: Performed by: STUDENT IN AN ORGANIZED HEALTH CARE EDUCATION/TRAINING PROGRAM

## 2024-01-24 NOTE — PROGRESS NOTES
"[facility] Note     Patient Name: Cierra Mane  Encounter Date: 1/24/2024     Have you been cared for by a [facility]atologist in the last 3 years and, if so, which description applies to you?    Yes.  I have been here within the last 3 years, and my medical history has NOT changed since that time.  I am FEMALE/of child-bearing potential.    REVIEW OF SYSTEMS:  Have you recently had or currently have any of the following? No changes in my recent health.   PAST MEDICAL HISTORY:  Have you personally ever had or currently have any of the following?  If \"YES,\" then please provide more detail. No changes in my medical history.   HISTORY OF IMMUNOSUPPRESSION: Do you have a history of any of the following:  Systemic Immunosuppression such as Diabetes, Biologic or Immunotherapy, Chemotherapy, Organ Transplantation, Bone Marrow Transplantation?  No     Answering \"YES\" requires the addition of the dotphrase \"IMMUNOSUPPRESSED\" as the first diagnosis of the patient's visit.   FAMILY HISTORY:  Any \"first degree relatives\" (parent, brother, sister, or child) with the following?    No changes in my family's known health.   PATIENT EXPERIENCE:    Do you want the Dermatologist to perform a COMPLETE skin exam today including a clinical examination under the \"bra and underwear\" areas?  NO  If necessary, do we have your permission to call and leave a detailed message on your Preferred Phone number that includes your specific medical information?  Yes      Allergies   Allergen Reactions    Albuterol Other (See Comments)     Atrial fibrillation    Dapsone Facial Swelling    Epinephrine     Mupirocin     Seasonal Ic [Cholestatin] Itching    Cephalosporins Rash      Current Outpatient Medications:     b complex vitamins capsule, Take 1 capsule by mouth daily, Disp: , Rfl:     calcipotriene (DOVONEX) 0.005 % topical solution, APPLY TO AFFECTED AREA(S) TWO TIMES A DAY (GENERIC DOVONEX), Disp: 60 mL, Rfl: 2    " Cholecalciferol (VITAMIN D3 PO), Take by mouth, Disp: , Rfl:     clobetasol (TEMOVATE) 0.05 % ointment, , Disp: , Rfl:     cyanocobalamin (VITAMIN B-12) 100 mcg tablet, Take by mouth daily, Disp: , Rfl:     cycloSPORINE (RESTASIS) 0.05 % ophthalmic emulsion, 1 drop by Tube route 2 (two) times a day, Disp: , Rfl:     dutasteride (AVODART) 0.5 mg capsule, Take 1 capsule (0.5 mg total) by mouth daily, Disp: 90 capsule, Rfl: 2    Eliquis 2.5 MG, , Disp: , Rfl:     estradiol (ESTRACE) 0.1 mg/g vaginal cream, Insert into the vagina 2 (two) times a week (Patient not taking: Reported on 1/19/2024), Disp: , Rfl:     folic acid (FOLVITE) 1 mg tablet, TAKE ONE TABLET BY MOUTH EVERY DAY WHILE ON METHOTREXATE TO PREVENT SIDE EFFECTS (GENERIC FOR FOLVITE), Disp: 90 tablet, Rfl: 1    Ivermectin (Soolantra) 1 % CREA, Apply topically 1-2 times a day to face area as needed for rosacea., Disp: 45 g, Rfl: 5    Lutein 40 MG CAPS, Take by mouth daily  , Disp: , Rfl:     Magnesium 500 MG CAPS, Take by mouth, Disp: , Rfl:     Magnesium Gluconate 550 MG TABS, Take 30 mg by mouth 2 (two) times a day  , Disp: , Rfl:     meloxicam (MOBIC) 15 mg tablet, , Disp: , Rfl:     metoprolol succinate (TOPROL-XL) 25 mg 24 hr tablet, Take 25 mg by mouth in the morning., Disp: , Rfl:     minoxidil (LONITEN) 2.5 mg tablet, TAKE ONE-HALF TABLET BY MOUTH EVERY DAY -GENERIC FOR LONITEN, Disp: 90 tablet, Rfl: 2    spironolactone (ALDACTONE) 25 mg tablet, Take 25 mg by mouth daily, Disp: , Rfl:     valACYclovir (VALTREX) 1,000 mg tablet, Take 1 tablet (1,000 mg total) by mouth 2 (two) times a day for 3 days (Patient not taking: Reported on 10/16/2023), Disp: 6 tablet, Rfl: 0        Whom besides the patient is providing clinical information about today's encounter?   NO ADDITIONAL HISTORIAN (patient alone provided history)    Physical Exam and Assessment/Plan by Diagnosis:    Hx of Gortex implants in b/l commissures years ago.     FILLER PROCEDURE NOTE    "  Product used:   Vinny Ultra  Lot  [id number]  EXP 05/19/2024    Diagnosis: Rhytides    Location: b/l marionettes and oral commissure    Informed consent: Discussed risks (infection, pain, bleeding, bruising, swelling, allergic reaction including anaphylaxis, lumps and bumps, vascular occlusion causing tissue death, loss of vision, undesirable cosmetic result, need for additional treatment, and death) and benefits of the procedure, as well as the alternatives. Informed consent was obtained.     Preparation: The area was cleansed with hibiclens.    Procedure Details:  Using the 25 g 1.5\" cannula and the provided sterile 30g needles, filler was deposited in the subcutaneous tissue in above areas using linear threads and depots. Pressure applied to any bleeding. No blanching visible at time of injection. For map of areas injected, see scanned face sheet.     Plan: Tylenol may be used for headache. Call for any problems. Written instructions provided.      Tolerance: well-tolerated; no issues     Complications: none     Dermal Filler aftercare sheet given.    Soft Tissue Fillers Post-Care    Within First Hour after dermal filler treatment:   Immediately after the treatment, there may be redness, bruising, swelling, tenderness, and/or itching near the injection site. This is normal and generally disappears within a few hours to a few days.   In our office, we will provide you with ice and suggest icing for about 5-10 min after the treatment.     Within Six Hours of the Treatment:   Avoid drinking alcohol or strenuous exercise, which may result in additional bruising. Until the swelling and redness have resolved, do not expose the area to intense heat, such a sunbathing, tanning, saunas, hot tubs, or hot wax. Also avoid extreme cold, such as skiing or hiking outdoors.   Ice Packs can be applied periodically to reduce swelling. You may also take pain medications, such as acetaminophen (Tylenol) or Ibuprofen (Advil, " Motrin, etc.). However, taking Ibuprofen or aspirin may exacerbate bruising, as they have blood thinning properties.   To help alleviate bruising, you can try topical application of Arnica, a natural ointment commonly used to reduce bruising. You can find Arnica in the natural foods section of your grocery store, or at local pharmacies. You can also supplement with Bromelin or eat pineapple to help process bruising faster.  If you have a lot of bruising, please let us know, we can offer treatment with a laser called PDL (located at our [place] location) to help speed the resolution of the bruising.    Minimize movement of the treated area. However, if there is a visible bump, you can massage the area. The product will soften into the skin naturally within a few days.   Sunscreen and makeup can be applied, and the area can be gently washed with a gentle cleanser.      Remember that dermal fillers are long lasting but not permanent. You always have a choice to continue or change the combination of treatments - and the procedure can be repeated as often as you like.   Hyaluronic acid fillers last anywhere from 6-9 months, depending on the area into which they were injected and your own body's metabolism. Radiesse filler can last 12-18 months.     Pt paid $625.   DO NOT BILL INSURANCE             Scribe Attestation      I,:  Sahhnaz Acosta am acting as a scribe while in the presence of the attending physician.:       I,:  Gloria Shoemaker MD personally performed the services described in this documentation    as scribed in my presence.:

## 2024-01-24 NOTE — PATIENT INSTRUCTIONS
Soft Tissue Fillers    Within First Hour after dermal filler treatment:   Immediately after the treatment, there may be redness, bruising, swelling, tenderness, and/or itching near the injection site. This is normal and generally disappears within a few hours to a few days.   In our office, we will provide you with ice and suggest icing for about 5-10 min after the treatment.     Within Six Hours of the Treatment:   Avoid drinking alcohol or strenuous exercise, which may result in additional bruising. Until the swelling and redness have resolved, do not expose the area to intense heat, such a sunbathing, tanning, saunas, hot tubs, or hot wax. Also avoid extreme cold, such as skiing or hiking outdoors.   Ice Packs can be applied periodically to reduce swelling. You may also take pain medications, such as acetaminophen (Tylenol) or Ibuprofen (Advil, Motrin, etc.). However, taking Ibuprofen or aspirin may exacerbate bruising, as they have blood thinning properties.   To help alleviate bruising, you can try topical application of Arnica, a natural ointment commonly used to reduce bruising. You can find Arnica in the natural foods section of your grocery store, or at local pharmacies.    Minimize movement of the treated area. However, if there is a visible bump, you can massage the area. The product will soften into the skin naturally within a few days.   Sunscreen and makeup can be applied, and the area can be gently washed with a gentle cleanser.      Remember that dermal fillers are long lasting but not permanent. You always have a choice to continue or change the combination of treatments - and the procedure can be repeated as often as you like.   Hyaluronic acid fillers last anywhere from 6-9 months, depending on the area into which they were injected and your own body's metabolism. Radiesse filler can last 12-18 months.     Sculptra - $800 per vial  Juvederm Ultra - $625 for first vial

## 2024-01-24 NOTE — RESULT ENCOUNTER NOTE
Pt presented to clinic today for in-person visit. Discussed benign appearance at this time c/w CNH. Discussed liberal vaseline use and offloading pressure to area with donut pillow when resting/sleeping. Pt voiced understanding. Pt aware to let us know if lesion panchal snot heal. Aware that it can take several weeks to heal from biopsy. Has scheduled follow up with me, will plan to re-evaluate at this time.

## 2024-01-26 RX ORDER — CLOBETASOL PROPIONATE 0.46 MG/ML
SOLUTION TOPICAL 2 TIMES DAILY
Qty: 50 ML | Refills: 3 | Status: SHIPPED | OUTPATIENT
Start: 2024-01-26

## 2024-02-19 ENCOUNTER — OFFICE VISIT (OUTPATIENT)
Dept: DERMATOLOGY | Facility: CLINIC | Age: 84
End: 2024-02-19
Payer: COMMERCIAL

## 2024-02-19 DIAGNOSIS — L66.1 LICHEN PLANOPILARIS: Primary | ICD-10-CM

## 2024-02-19 DIAGNOSIS — L63.9 ALOPECIA AREATA: ICD-10-CM

## 2024-02-19 PROCEDURE — 99213 OFFICE O/P EST LOW 20 MIN: CPT | Performed by: STUDENT IN AN ORGANIZED HEALTH CARE EDUCATION/TRAINING PROGRAM

## 2024-02-19 NOTE — PROGRESS NOTES
"[facility] Note     Patient Name: Cierra Mane  Encounter Date: 2/19/2024     Have you been cared for by a [facility] Dermatologist in the last 3 years and, if so, which description applies to you?    Yes.  I have been here within the last 3 years, and my medical history has NOT changed since that time.  I am FEMALE/of child-bearing potential.    REVIEW OF SYSTEMS:  Have you recently had or currently have any of the following? No changes in my recent health.   PAST MEDICAL HISTORY:  Have you personally ever had or currently have any of the following?  If \"YES,\" then please provide more detail. No changes in my medical history.   HISTORY OF IMMUNOSUPPRESSION: Do you have a history of any of the following:  Systemic Immunosuppression such as Diabetes, Biologic or Immunotherapy, Chemotherapy, Organ Transplantation, Bone Marrow Transplantation?  No     Answering \"YES\" requires the addition of the dotphrase \"IMMUNOSUPPRESSED\" as the first diagnosis of the patient's visit.   FAMILY HISTORY:  Any \"first degree relatives\" (parent, brother, sister, or child) with the following?    No changes in my family's known health.   PATIENT EXPERIENCE:    Do you want the Dermatologist to perform a COMPLETE skin exam today including a clinical examination under the \"bra and underwear\" areas?  NO  If necessary, do we have your permission to call and leave a detailed message on your Preferred Phone number that includes your specific medical information?  Yes      Allergies   Allergen Reactions    Albuterol Other (See Comments)     Atrial fibrillation    Dapsone Facial Swelling    Epinephrine     Mupirocin     Seasonal Ic [Cholestatin] Itching    Cephalosporins Rash      Current Outpatient Medications:     b complex vitamins capsule, Take 1 capsule by mouth daily, Disp: , Rfl:     calcipotriene (DOVONEX) 0.005 % topical solution, APPLY TO AFFECTED AREA(S) TWO TIMES A DAY (GENERIC DOVONEX), Disp: 60 mL, Rfl: 2    " Cholecalciferol (VITAMIN D3 PO), Take by mouth, Disp: , Rfl:     clobetasol (TEMOVATE) 0.05 % external solution, Apply topically 2 (two) times a day, Disp: 50 mL, Rfl: 3    clobetasol (TEMOVATE) 0.05 % ointment, , Disp: , Rfl:     cyanocobalamin (VITAMIN B-12) 100 mcg tablet, Take by mouth daily, Disp: , Rfl:     cycloSPORINE (RESTASIS) 0.05 % ophthalmic emulsion, 1 drop by Tube route 2 (two) times a day, Disp: , Rfl:     dutasteride (AVODART) 0.5 mg capsule, Take 1 capsule (0.5 mg total) by mouth daily, Disp: 90 capsule, Rfl: 2    Eliquis 2.5 MG, , Disp: , Rfl:     estradiol (ESTRACE) 0.1 mg/g vaginal cream, Insert into the vagina 2 (two) times a week (Patient not taking: Reported on 1/19/2024), Disp: , Rfl:     folic acid (FOLVITE) 1 mg tablet, TAKE ONE TABLET BY MOUTH EVERY DAY WHILE ON METHOTREXATE TO PREVENT SIDE EFFECTS (GENERIC FOR FOLVITE), Disp: 90 tablet, Rfl: 1    Ivermectin (Soolantra) 1 % CREA, Apply topically 1-2 times a day to face area as needed for rosacea., Disp: 45 g, Rfl: 5    Lutein 40 MG CAPS, Take by mouth daily  , Disp: , Rfl:     Magnesium 500 MG CAPS, Take by mouth, Disp: , Rfl:     Magnesium Gluconate 550 MG TABS, Take 30 mg by mouth 2 (two) times a day  , Disp: , Rfl:     meloxicam (MOBIC) 15 mg tablet, , Disp: , Rfl:     metoprolol succinate (TOPROL-XL) 25 mg 24 hr tablet, Take 25 mg by mouth in the morning., Disp: , Rfl:     minoxidil (LONITEN) 2.5 mg tablet, TAKE ONE-HALF TABLET BY MOUTH EVERY DAY -GENERIC FOR LONITEN, Disp: 90 tablet, Rfl: 2    spironolactone (ALDACTONE) 25 mg tablet, Take 25 mg by mouth daily, Disp: , Rfl:     valACYclovir (VALTREX) 1,000 mg tablet, Take 1 tablet (1,000 mg total) by mouth 2 (two) times a day for 3 days (Patient not taking: Reported on 10/16/2023), Disp: 6 tablet, Rfl: 0        Whom besides the patient is providing clinical information about today's encounter?   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 Well nourishes, well developed. Alert and oriented. A focused skin exam was performed including scalp and hair. The exam was negative except as noted below:   Scalp:   Faint perifollicular erythema with follicular prominence on frontal scalp  Hairline: preserved    Additional History of Present Condition:    Is taking oral minoxidil 1.25 mg daily- seems refill was sent in in 1/2024, originally started by Dr. Beaver.  Notes discussed minoxidil with cardiology given cardiac risk and cardiology told her to only continue topical however she still continued it.   Continues to take oral dutasteride  Started clobetasol to scalp as well recently  Last PRP tx 11/20/23    Patient notes she is noticing a lot of improvement in her hair. She states she has new growth, no breakage, and no    shedding. She states she is scheduled to have a scalp biopsy with Dr. Hou on 2/24/2024    Assessment and Plan:  Based on a thorough discussion of this condition and the management approach to it (including a comprehensive discussion of the known risks, side effects and potential benefits of treatment), the patient (family) agrees to implement the following specific plan:  Discussed additional ILK injections, however we should wait until after her scalp biopsy is done. Patient agrees with this.  Continue topical minoxidil, topical clobetasol, oral dutasteride 0.5 mg daily, and topical calcipotriene  Hold oral minoxidil until she again checks in with new cardiologists as discussed would defer decision to not continue to them  Will revisit PRP in 3-6 months if needed pending scalp biopsies and progression of hair growth.         CHONDRODERMATITIS NODULARIS HELICIS (\"CNH\"), BX PROVEN, RIGHT ANTIHELIX    Physical Exam:  Anatomic Location Affected & Morphological Description:  healing biopsy site, almost entirely healed with " minimal amount of scale centrally  Pertinent Positives:  Pertinent Negatives:    Additional History of Present Condition:  S/p biopsy showing CNH in 1/2024. Healing well, has been sleeping on back.     Assessment and Plan:  Based on a thorough discussion of this condition and the management approach to it (including a comprehensive discussion of the known risks, side effects and potential benefits of treatment), the patient (family) agrees to implement the following specific plan:  Continue pressure offloading    What is chondrodermatitis nodularis helicis?  Chondrodermatitis nodularis helices is a common inflammatory condition which affects the skin and cartilage of the helix or antihelix of the ear. Chondrodermatitis nodularis occurs more frequently in fair-skinned and middle-aged older males, with 10-35% of the cases reported in women. It is rarely reported in children.    What causes chondrodermatitis nodularis helicis?  The exact cause of chondrodermatitis nodularis is still unknown but there are several factors that contribute to its development.  Repeated pressure and compromised blood supply to the ear (predominantly in people who sleep on one side)  Exposure to cold and sun  Trauma from headphones, mobile devices or hearing aids  Connective tissue disease (eg, lupus erythematosus, dermatomyositis, and scleroderma)    Chondrodermatitis nodularis is a solitary, firm, and oval-shaped nodule, 4-6 mm in diameter, with central crust and surrounding erythema.  In men, the most common site for CNH is the helix, while in women it is more often found on the antihelix.   It is typically unilateral, located on the sleeping side, but can be bilateral.  CNH is painful and tender. Pain at night may prevent sleeping on the affected side.  The lesion can bleed or discharge a small amount of scaly material.     How do we diagnose chrondrodermatitis nodularis helices?  In most cases, the diagnosis of chondrodermatitis  nodularis is made clinically, based on the characteristic location on the helix or antihelix, and a typical history of pain and tenderness.  Sometimes an excisional biopsy with tissue exam may be necessary to confirm the diagnosis.  Other diagnoses to consider are squamous cell carcinoma, keratoacanthoma, viral wart, gouty tophus, calcinosis cutis, basal cell carcinoma, and actinic damage.    How do we treat chondrodermatitis nodularis helices?  Protective padding at night can relieve pressure on the area affected by CNH.  Use a soft pillow and try to avoid sleeping only on one side  Foam rubber or a bath sponge can be used to make a chondrodermatitis nodularis ear protector to wear at night, held in place with an elastic headband (or purchase an ear protector from a [id number]).  Wear a warm hat over the ears when outside in the cold and wind.  Apply petroleum jelly or an antibacterial ointment under a light dressing, especially if the chondrodermatitis nodularis is ulcerated or infected.    Other treatments include:  Steroid injection (triamcinolone acetonide) to reduce local inflammation  Filler injections of collagen or hyaluronic acid to provide a cushioning layer  Topical nitroglycerin ointment (1-2%) for increased blood flow to affected segments of the ear. This is applied twice daily with potential side effects of mild headache and skin irritation.     If these options fail, surgery may be required to remove affected skin and cartilage. Curettage can also help remove the affected tissue.     Generally, chondrodermatitis nodularis helices resolves within a few months, but can recur.     Scribe Attestation      I,:  Shahnaz Acosta am acting as a scribe while in the presence of the attending physician.:       I,:  Gloria Shoemaker MD personally performed the services described in this documentation    as scribed in my presence.:

## 2024-02-23 ENCOUNTER — OFFICE VISIT (OUTPATIENT)
Dept: DERMATOLOGY | Facility: CLINIC | Age: 84
End: 2024-02-23
Payer: COMMERCIAL

## 2024-02-23 VITALS — WEIGHT: 117 LBS | BODY MASS INDEX: 20.73 KG/M2 | TEMPERATURE: 97 F

## 2024-02-23 DIAGNOSIS — L65.9 HAIR LOSS: Primary | ICD-10-CM

## 2024-02-23 PROCEDURE — 88313 SPECIAL STAINS GROUP 2: CPT | Performed by: DERMATOLOGY

## 2024-02-23 PROCEDURE — 11104 PUNCH BX SKIN SINGLE LESION: CPT | Performed by: STUDENT IN AN ORGANIZED HEALTH CARE EDUCATION/TRAINING PROGRAM

## 2024-02-23 PROCEDURE — 88305 TISSUE EXAM BY PATHOLOGIST: CPT | Performed by: DERMATOLOGY

## 2024-02-23 NOTE — PATIENT INSTRUCTIONS
"RASH - LPP vs Alopecia areata       Physical Exam:  (Anatomic Location); (Size and Morphological Description); (Differential Diagnosis):  SPECIMEN A: Scalp erythema associated with hair loss; hairline; punch biopsy in formalin; DDX: lichen plano pilaris vs AGA     Additional History of Present Condition:  Patient is here for biopsy of scalp to rule out lichen plano pilaris; she has been using topical clobetasol, topical minoxidil, topical calcipotriene, and oral dutasteride 0.5 mg. She was told to hold oral minoxidil by prior cardiologist given cardiac risk and will defer decision to new cardiologist at next appt.      Assessment and Plan:  I have discussed with the patient that a sample of skin via a \"skin biopsy” would be potentially helpful to further make a specific diagnosis under the microscope.  Based on a thorough discussion of this condition and the management approach to it (including a comprehensive discussion of the known risks, side effects and potential benefits of treatment), the patient (family) agrees to implement the following specific plan:     Procedure:  Skin Biopsy.  After a thorough discussion of treatment options and risk/benefits/alternatives (including but not limited to local pain, scarring, dyspigmentation, blistering, possible superinfection, and inability to confirm a diagnosis via histopathology), verbal and written consent were obtained and portion of the rash was biopsied for tissue sample.  See below for consent that was obtained from patient and subsequent Procedure Note.        PROCEDURE NOTE:  PUNCH BIOPSY        Performing Physician:      Anatomic Location; Clinical Description with size (cm); Pre-Op Diagnosis:   SPECIMEN A: scalp erythema associated with hair loss; hairline; punch biopsy in formalin; DDX: lichen plano pilaris vs AGA                    Anesthesia: 3:1 1% xylocaine with epi and 1-100,000 buffered                  Topical anesthesia: None                 "   Indications: To indicate diagnosis and management plan.     Procedure Details      Patient informed of the risks (including bleeding,scaring and infection) and benefits of the procedure explained. Verbal and written informed consent obtained. The area was prepped and draped in the usual fashion. Anesthesia was obtained with 1% lidocaine with epinephrine. The skin was then stretched perpendicular to the skin tension lines and a punch biopsy to an appropriate sampling depth was obtained with a 4 mm punch with a forceps and iris scissors.                      Complications:  None        Specimen has been sent for review by Dermatopathology.        Plan:  1. Instructed to keep the wound dry and covered for 24-48h and clean thereafter.  2. Warning signs of infection were reviewed.    3. Recommended that the patient use acetaminophen as needed for pain  4. Sutures if any should be removed in 7-10 days        Standard post-procedure care has been explained and has been included in written form within the patient's copy of Informed Consent.        Scribe Attestation       I,:  JAY Gonzalez am acting as a scribe while in the presence of the attending physician.:       I,:  Lisandro Hou MD personally performed the services described in this documentation    as scribed in my presence.:

## 2024-02-23 NOTE — PROGRESS NOTES
"[facility] Note     Patient Name: Cierra Mane  Encounter Date: 2/23/24     Have you been cared for by a [facility] Dermatologist in the last 3 years and, if so, which description applies to you?    Yes.  I have been here within the last 3 years, and my medical history has NOT changed since that time.  I am FEMALE/of child-bearing potential.    REVIEW OF SYSTEMS:  Have you recently had or currently have any of the following? No changes in my recent health.   PAST MEDICAL HISTORY:  Have you personally ever had or currently have any of the following?  If \"YES,\" then please provide more detail. No changes in my medical history.   HISTORY OF IMMUNOSUPPRESSION: Do you have a history of any of the following:  Systemic Immunosuppression such as Diabetes, Biologic or Immunotherapy, Chemotherapy, Organ Transplantation, Bone Marrow Transplantation?  No     Answering \"YES\" requires the addition of the dotphrase \"IMMUNOSUPPRESSED\" as the first diagnosis of the patient's visit.   FAMILY HISTORY:  Any \"first degree relatives\" (parent, brother, sister, or child) with the following?    No changes in my family's known health.   PATIENT EXPERIENCE:    Do you want the Dermatologist to perform a COMPLETE skin exam today including a clinical examination under the \"bra and underwear\" areas?  NO  If necessary, do we have your permission to call and leave a detailed message on your Preferred Phone number that includes your specific medical information?  Yes      Allergies   Allergen Reactions    Albuterol Other (See Comments)     Atrial fibrillation    Afib    Dapsone Facial Swelling    Epinephrine     Mupirocin     Seasonal Ic [Cholestatin] Itching    Cephalosporins Rash      Current Outpatient Medications:     b complex vitamins capsule, Take 1 capsule by mouth daily, Disp: , Rfl:     Cholecalciferol (VITAMIN D3 PO), Take by mouth, Disp: , Rfl:     clobetasol (TEMOVATE) 0.05 % external solution, Apply topically 2 (two) " times a day, Disp: 50 mL, Rfl: 3    clobetasol (TEMOVATE) 0.05 % ointment, , Disp: , Rfl:     cyanocobalamin (VITAMIN B-12) 100 mcg tablet, Take by mouth daily, Disp: , Rfl:     cycloSPORINE (RESTASIS) 0.05 % ophthalmic emulsion, 1 drop by Tube route 2 (two) times a day, Disp: , Rfl:     dutasteride (AVODART) 0.5 mg capsule, Take 1 capsule (0.5 mg total) by mouth daily, Disp: 90 capsule, Rfl: 2    folic acid (FOLVITE) 1 mg tablet, TAKE ONE TABLET BY MOUTH EVERY DAY WHILE ON METHOTREXATE TO PREVENT SIDE EFFECTS (GENERIC FOR FOLVITE), Disp: 90 tablet, Rfl: 1    Ivermectin (Soolantra) 1 % CREA, Apply topically 1-2 times a day to face area as needed for rosacea., Disp: 45 g, Rfl: 5    Lutein 40 MG CAPS, Take by mouth daily  , Disp: , Rfl:     Magnesium 500 MG CAPS, Take by mouth, Disp: , Rfl:     Magnesium Gluconate 550 MG TABS, Take 30 mg by mouth 2 (two) times a day  , Disp: , Rfl:     metoprolol succinate (TOPROL-XL) 25 mg 24 hr tablet, Take 25 mg by mouth in the morning., Disp: , Rfl:     spironolactone (ALDACTONE) 25 mg tablet, Take 25 mg by mouth daily, Disp: , Rfl:     calcipotriene (DOVONEX) 0.005 % topical solution, APPLY TO AFFECTED AREA(S) TWO TIMES A DAY (GENERIC DOVONEX) (Patient not taking: Reported on 2/23/2024), Disp: 60 mL, Rfl: 2    Eliquis 2.5 MG, , Disp: , Rfl:     estradiol (ESTRACE) 0.1 mg/g vaginal cream, Insert into the vagina 2 (two) times a week (Patient not taking: Reported on 1/19/2024), Disp: , Rfl:     meloxicam (MOBIC) 15 mg tablet, , Disp: , Rfl:     minoxidil (LONITEN) 2.5 mg tablet, TAKE ONE-HALF TABLET BY MOUTH EVERY DAY -GENERIC FOR LONITEN (Patient not taking: Reported on 2/23/2024), Disp: 90 tablet, Rfl: 2    valACYclovir (VALTREX) 1,000 mg tablet, Take 1 tablet (1,000 mg total) by mouth 2 (two) times a day for 3 days (Patient not taking: Reported on 10/16/2023), Disp: 6 tablet, Rfl: 0          Whom besides the patient is providing clinical information about today's encounter?   NO  "ADDITIONAL HISTORIAN (patient alone provided history)    Physical Exam and Assessment/Plan by Diagnosis:    RASH - LPP vs Androgenetic alopecia     Physical Exam:  (Anatomic Location); (Size and Morphological Description); (Differential Diagnosis):  SPECIMEN A: Scalp erythema associated with hair loss; hairline; punch biopsy in formalin; DDX: lichen plano pilaris vs AGA    Additional History of Present Condition:  Patient is here for biopsy of scalp due to hair loss and scalp inflammation. She has been using topical clobetasol, topical minoxidil, topical calcipotriene, and oral dutasteride 0.5 mg. She was told to hold oral minoxidil by prior cardiologist given cardiac risk and will defer decision to new cardiologist at next appt.     Assessment and Plan:  I have discussed with the patient that a sample of skin via a \"skin biopsy” would be potentially helpful to further make a specific diagnosis under the microscope.  Based on a thorough discussion of this condition and the management approach to it (including a comprehensive discussion of the known risks, side effects and potential benefits of treatment), the patient (family) agrees to implement the following specific plan:    Procedure:  Skin Biopsy. Will call with results.   After a thorough discussion of treatment options and risk/benefits/alternatives (including but not limited to local pain, scarring, dyspigmentation, blistering, possible superinfection, and inability to confirm a diagnosis via histopathology), verbal and written consent were obtained and portion of the rash was biopsied for tissue sample.  See below for consent that was obtained from patient and subsequent Procedure Note.      PROCEDURE NOTE:  PUNCH BIOPSY      Performing Physician:     Anatomic Location; Clinical Description with size (cm); Pre-Op Diagnosis:   SPECIMEN A: scalp erythema associated with hair loss; hairline; punch biopsy in formalin; DDX: lichen plano pilaris vs " AGA       Anesthesia: 3:1 1% xylocaine with epi and 1-100,000 buffered      Topical anesthesia: None       Indications: To indicate diagnosis and management plan.    Procedure Details     Patient informed of the risks (including bleeding,scaring and infection) and benefits of the procedure explained. Verbal and written informed consent obtained. The area was prepped and draped in the usual fashion. Anesthesia was obtained with 1% lidocaine with epinephrine. The skin was then stretched perpendicular to the skin tension lines and a punch biopsy to an appropriate sampling depth was obtained with a 4 mm punch with a forceps and iris scissors.     Hemostasis was obtained with 4-0 Prolene x 2 sutures.     Complications:  None      Specimen has been sent for review by Dermatopathology.      Plan:  1. Instructed to keep the wound dry and covered for 24-48h and clean thereafter.  2. Warning signs of infection were reviewed.    3. Recommended that the patient use acetaminophen as needed for pain  4. Sutures if any should be removed in 9 days      Standard post-procedure care has been explained and has been included in written form within the patient's copy of Informed Consent.      Scribe Attestation      I,:  JAY Gonzalez am acting as a scribe while in the presence of the attending physician.:       I,:  Lisandro Hou MD personally performed the services described in this documentation    as scribed in my presence.:                 Patient was seen and discussed with Dr. Hou.   JAY Gonzalez 02/23/24

## 2024-02-28 ENCOUNTER — COSMETIC (OUTPATIENT)
Dept: DERMATOLOGY | Facility: CLINIC | Age: 84
End: 2024-02-28
Payer: COMMERCIAL

## 2024-02-28 ENCOUNTER — TELEPHONE (OUTPATIENT)
Age: 84
End: 2024-02-28

## 2024-02-28 DIAGNOSIS — L71.9 ROSACEA: Primary | ICD-10-CM

## 2024-02-28 PROCEDURE — 99214 OFFICE O/P EST MOD 30 MIN: CPT | Performed by: STUDENT IN AN ORGANIZED HEALTH CARE EDUCATION/TRAINING PROGRAM

## 2024-02-28 RX ORDER — DOXYCYCLINE HYCLATE 100 MG/1
100 CAPSULE ORAL EVERY 12 HOURS SCHEDULED
Qty: 14 CAPSULE | Refills: 0 | Status: SHIPPED | OUTPATIENT
Start: 2024-02-28 | End: 2024-03-06

## 2024-02-28 RX ORDER — DOXYCYCLINE 100 MG/1
100 CAPSULE ORAL 2 TIMES DAILY
Qty: 14 CAPSULE | Refills: 0 | Status: CANCELLED | OUTPATIENT
Start: 2024-02-28 | End: 2024-03-06

## 2024-02-28 NOTE — TELEPHONE ENCOUNTER
Patient called as she just had an appt with Dr. Shoemaker at 11:15 today and she is at her pharmacy and they say they do not have it. I dont see it in her chart either. I advised the patient I would let them know she was waiting for her prescription to be sent over

## 2024-02-28 NOTE — PROGRESS NOTES
"[facility] Note     Patient Name: Cierra Mane  Encounter Date: 2/28/24     Have you been cared for by a [facility] Dermatologist in the last 3 years and, if so, which description applies to you?    Yes.  I have been here within the last 3 years, and my medical history has NOT changed since that time.  I am FEMALE/of child-bearing potential.    REVIEW OF SYSTEMS:  Have you recently had or currently have any of the following? No changes in my recent health.   PAST MEDICAL HISTORY:  Have you personally ever had or currently have any of the following?  If \"YES,\" then please provide more detail. No changes in my medical history.   HISTORY OF IMMUNOSUPPRESSION: Do you have a history of any of the following:  Systemic Immunosuppression such as Diabetes, Biologic or Immunotherapy, Chemotherapy, Organ Transplantation, Bone Marrow Transplantation?  No     Answering \"YES\" requires the addition of the dotphrase \"IMMUNOSUPPRESSED\" as the first diagnosis of the patient's visit.   FAMILY HISTORY:  Any \"first degree relatives\" (parent, brother, sister, or child) with the following?    No changes in my family's known health.   PATIENT EXPERIENCE:    Do you want the Dermatologist to perform a COMPLETE skin exam today including a clinical examination under the \"bra and underwear\" areas?  NO  If necessary, do we have your permission to call and leave a detailed message on your Preferred Phone number that includes your specific medical information?  Yes      Allergies   Allergen Reactions    Albuterol Other (See Comments)     Atrial fibrillation    Afib    Dapsone Facial Swelling    Epinephrine     Mupirocin     Seasonal Ic [Cholestatin] Itching    Cephalosporins Rash      Current Outpatient Medications:     b complex vitamins capsule, Take 1 capsule by mouth daily, Disp: , Rfl:     calcipotriene (DOVONEX) 0.005 % topical solution, APPLY TO AFFECTED AREA(S) TWO TIMES A DAY (GENERIC DOVONEX) (Patient not taking: Reported " on 2/23/2024), Disp: 60 mL, Rfl: 2    Cholecalciferol (VITAMIN D3 PO), Take by mouth, Disp: , Rfl:     clobetasol (TEMOVATE) 0.05 % external solution, Apply topically 2 (two) times a day, Disp: 50 mL, Rfl: 3    clobetasol (TEMOVATE) 0.05 % ointment, , Disp: , Rfl:     cyanocobalamin (VITAMIN B-12) 100 mcg tablet, Take by mouth daily, Disp: , Rfl:     cycloSPORINE (RESTASIS) 0.05 % ophthalmic emulsion, 1 drop by Tube route 2 (two) times a day, Disp: , Rfl:     dutasteride (AVODART) 0.5 mg capsule, Take 1 capsule (0.5 mg total) by mouth daily, Disp: 90 capsule, Rfl: 2    Eliquis 2.5 MG, , Disp: , Rfl:     estradiol (ESTRACE) 0.1 mg/g vaginal cream, Insert into the vagina 2 (two) times a week (Patient not taking: Reported on 1/19/2024), Disp: , Rfl:     folic acid (FOLVITE) 1 mg tablet, TAKE ONE TABLET BY MOUTH EVERY DAY WHILE ON METHOTREXATE TO PREVENT SIDE EFFECTS (GENERIC FOR FOLVITE), Disp: 90 tablet, Rfl: 1    Ivermectin (Soolantra) 1 % CREA, Apply topically 1-2 times a day to face area as needed for rosacea., Disp: 45 g, Rfl: 5    Lutein 40 MG CAPS, Take by mouth daily  , Disp: , Rfl:     Magnesium 500 MG CAPS, Take by mouth, Disp: , Rfl:     Magnesium Gluconate 550 MG TABS, Take 30 mg by mouth 2 (two) times a day  , Disp: , Rfl:     meloxicam (MOBIC) 15 mg tablet, , Disp: , Rfl:     metoprolol succinate (TOPROL-XL) 25 mg 24 hr tablet, Take 25 mg by mouth in the morning., Disp: , Rfl:     minoxidil (LONITEN) 2.5 mg tablet, TAKE ONE-HALF TABLET BY MOUTH EVERY DAY -GENERIC FOR LONITEN (Patient not taking: Reported on 2/23/2024), Disp: 90 tablet, Rfl: 2    spironolactone (ALDACTONE) 25 mg tablet, Take 25 mg by mouth daily, Disp: , Rfl:     valACYclovir (VALTREX) 1,000 mg tablet, Take 1 tablet (1,000 mg total) by mouth 2 (two) times a day for 3 days (Patient not taking: Reported on 10/16/2023), Disp: 6 tablet, Rfl: 0          Whom besides the patient is providing clinical information about today's encounter?   NO  ADDITIONAL HISTORIAN (patient alone provided history)    Physical Exam and Assessment/Plan by Diagnosis:      ROSACEA    Physical Exam:  Anatomic Location Affected:  Face  Morphological Description:  erythema and telangiectasias with palpable warmth on left cheek  Pertinent Positives:  Pertinent Negatives:    Additional History of Present Condition:  Patient reports with a rosacea flare up after beign otuside on a walk two days ago.     Assessment and Plan:  Based on a thorough discussion of this condition and the management approach to it (including a comprehensive discussion of the known risks, side effects and potential benefits of treatment), the patient (family) agrees to implement the following specific plan:  Start prescribed doxycycline 100 mg BID for 7 days.   Take the pill with a meal and a big glass of water. Avoid milk or dairy intake and any multivitamins or magnesium or calcium containing supplements immediately before or after taking the pill.  If you take this on an empty stomach, it can cause nausea.  Without water intake, it can cause burning and even ulceration of the esophagus.  Avoid lying down immediately after taking the pill due to this burning sensation.  It's best to take this at least one hour prior to bedtime if taking at night.  Doxycycline can make you more sensitive to the sun. Be careful to use sunscreen and protective clothing while on this medication.  Continue topical rosacea medications (ivermectin) daily.     Rosacea is a chronic rash affecting the mid-face including the nose, cheeks, chin, forehead, and eyelids. The incidence is usually greatest between the ages of 30-60 years and is more common in people with fair skin. Common characteristics include redness, telangiectasias, papules and pustules over affected areas. Rosacea may look similar to acne, but there is a lack of comedones. Occasionally the eyes may also be involved in ocular rosacea. In advanced disease, enlargement  of the sebaceous glands in the nose, termed rhinophyma, may be present.     Rosacea results in red spots (papules) and sometimes pustules over the face, but unlike acne there are no blackheads, whiteheads, or cystic nodules. Patients often experience increased facial flushing with prominent blood vessels (erythematotelangiectatic rosacea) and dry, sensitive skin. These symptoms are exacerbated by sun exposure, hot or spicy foods, topical steroids and oil-based facial products.     In ocular rosacea, eyelids may be red and sore due to conjunctivitis, keratitis, and episcleritis. If rhinophyma develops due to enlargement of sebaceous glands, the patient may have an enlarged and irregularly shaped nose with prominent pores. In rosacea that is refractory to treatment, patients can develop persistent redness and swelling of the face due to lymphatic obstruction (Morbihan disease).     Distribution around the cheeks may be confused with the malar or “butterfly rash” of lupus. However, the rash of lupus spares the nasal creases and lacks papules and pustules. If signs of photosensitivity, oral ulcers, arthritis, and kidney dysfunction are present then consider referral to a rheumatologist.     There are many potential causes of rosacea including genetic, environmental, vascular, and inflammatory factors. These include, but are not limited to:  Chronic exposure to ultraviolet radiation   Increased immune responses in the form of cathelicidins that promote vessel dilation and infiltration with white blood cells (neutrophils) into the dermis  Increased matrix metalloproteinases such as collagen and elastase that remodel normal tissue may contribute to inflammation of the skin making it thicker and harder  There is some evidence to suggest that increased numbers of demodex mites on patient skin may contribute to rosacea papules     General Treatment Approach   Avoid exacerbating factors such as heat, spicy foods, and alcohol    Use daily SPF30+ sunscreen and other methods of coverage for sun protection  Use water-based make-up   Avoid applying topical steroids to affected areas as they can cause perioral dermatitis and exacerbate rosacea     Topical Treatment Approach  Metronidazole cream or gel by itself or in combination with oral antibiotics for more severe cases  Azelaic acid cream or lotion is effective for mild inflammatory rosacea when applied twice daily to affected areas  Brimonidine gel and oxymetazoline hydrochloride cream can reduce facial redness temporarily   Ivermectin cream can treat papulopustular rosacea by controlling demodex mites and inflammation   Pimecrolimus cream or tacrolimus ointment twice a day for 2-3 months can help reduce inflammation    Oral Treatment Approach  Antibiotics such as doxycycline, minocycline, or erythromycin for 1-3 months  Clonidine and carvedilol can help reduce facial flushing and are generally well tolerated. Common side effects include low blood pressure, gastrointestinal upset, dry eyes, blurred vision and low heart rate.   Isotretinoin at low doses can be effective for long term treatment when antibiotics fail. Side effects may make it unsuitable for some patients.   NSAIDs such as diclofenac can help reduce discomfort and redness in the skin.     Procedural/Surgical Treatment Approach   Vascular lasers or intense pulsed light treatment may be used to treat persistent telangiectasia and papulopustular rosacea  Plastic surgery and carbon dioxide lasers may be used to treat rhinophyma       Scribe Attestation      I,:  Bennie Perez am acting as a scribe while in the presence of the attending physician.:       I,:  Gloria Shoemaker MD personally performed the services described in this documentation    as scribed in my presence.:

## 2024-03-04 ENCOUNTER — CLINICAL SUPPORT (OUTPATIENT)
Dept: DERMATOLOGY | Facility: CLINIC | Age: 84
End: 2024-03-04

## 2024-03-04 DIAGNOSIS — L71.9 ROSACEA: ICD-10-CM

## 2024-03-04 DIAGNOSIS — Z48.02 ENCOUNTER FOR REMOVAL OF SUTURES: Primary | ICD-10-CM

## 2024-03-04 PROCEDURE — RECHECK

## 2024-03-04 RX ORDER — IVERMECTIN 10 MG/G
CREAM TOPICAL
Qty: 45 G | Refills: 5 | Status: SHIPPED | OUTPATIENT
Start: 2024-03-04

## 2024-03-04 NOTE — PROGRESS NOTES
"Suture removal    Date/Time: 3/4/2024 10:30 AM    Performed by: Beronica Santana RN  Authorized by: Krissy Lentz MD  Universal Protocol:  Consent: Verbal consent obtained. Written consent not obtained.  Risks and benefits: risks, benefits and alternatives were discussed  Consent given by: patient  Time out: Immediately prior to procedure a \"time out\" was called to verify the correct patient, procedure, equipment, support staff and site/side marked as required.  Timeout called at: 3/4/2024 10:41 AM.  Patient understanding: patient states understanding of the procedure being performed  Patient consent: the patient's understanding of the procedure matches consent given  Procedure consent: procedure consent matches procedure scheduled  Relevant documents: relevant documents not present or verified  Test results: test results not available  Site marked: the operative site was not marked  Radiology Images displayed and confirmed. If images not available, report reviewed: imaging studies not available  Patient identity confirmed: verbally with patient      Patient location:  Clinic  Location:     Laterality:  Median    Location:  Head/neck    Head/neck location:  Scalp  Procedure details:     Tools used:  Suture removal kit    Wound appearance:  No sign(s) of infection, good wound healing, clean, moist and pink    Number of sutures removed:  2  Post-procedure details:     Post-removal:  No dressing applied    Patient tolerance of procedure:  Tolerated well, no immediate complications  Comments:      Patient advised to use Vaseline if area becomes dry or irritated.      After suture removal      Before suture removal      "

## 2024-03-05 PROCEDURE — 88305 TISSUE EXAM BY PATHOLOGIST: CPT | Performed by: DERMATOLOGY

## 2024-03-05 PROCEDURE — 88313 SPECIAL STAINS GROUP 2: CPT | Performed by: DERMATOLOGY

## 2024-03-06 ENCOUNTER — COSMETIC (OUTPATIENT)
Dept: DERMATOLOGY | Facility: CLINIC | Age: 84
End: 2024-03-06

## 2024-03-06 DIAGNOSIS — Z41.1 ENCOUNTER FOR COSMETIC PROCEDURE: Primary | ICD-10-CM

## 2024-03-06 PROCEDURE — SCPRAS1: Performed by: STUDENT IN AN ORGANIZED HEALTH CARE EDUCATION/TRAINING PROGRAM

## 2024-03-06 NOTE — PROGRESS NOTES
"[facility] Dermatology Clinic Note     Patient Name: Cierra Mane  Encounter Date: 3/6/24     Have you been cared for by a [facility] Dermatologist in the last 3 years and, if so, which description applies to you?    Yes.  I have been here within the last 3 years, and my medical history has NOT changed since that time.  I am FEMALE/of child-bearing potential.    REVIEW OF SYSTEMS:  Have you recently had or currently have any of the following? No changes in my recent health.   PAST MEDICAL HISTORY:  Have you personally ever had or currently have any of the following?  If \"YES,\" then please provide more detail. No changes in my medical history.   HISTORY OF IMMUNOSUPPRESSION: Do you have a history of any of the following:  Systemic Immunosuppression such as Diabetes, Biologic or Immunotherapy, Chemotherapy, Organ Transplantation, Bone Marrow Transplantation?  No     Answering \"YES\" requires the addition of the dotphrase \"IMMUNOSUPPRESSED\" as the first diagnosis of the patient's visit.   FAMILY HISTORY:  Any \"first degree relatives\" (parent, brother, sister, or child) with the following?    No changes in my family's known health.   PATIENT EXPERIENCE:    Do you want the Dermatologist to perform a COMPLETE skin exam today including a clinical examination under the \"bra and underwear\" areas?  NO  If necessary, do we have your permission to call and leave a detailed message on your Preferred Phone number that includes your specific medical information?  Yes      Allergies   Allergen Reactions    Albuterol Other (See Comments)     Atrial fibrillation    Afib    Dapsone Facial Swelling    Epinephrine     Mupirocin     Seasonal Ic [Cholestatin] Itching    Cephalosporins Rash      Current Outpatient Medications:     b complex vitamins capsule, Take 1 capsule by mouth daily, Disp: , Rfl:     calcipotriene (DOVONEX) 0.005 % topical solution, APPLY TO AFFECTED AREA(S) TWO TIMES A DAY (GENERIC DOVONEX) (Patient not taking: Reported " on 2/23/2024), Disp: 60 mL, Rfl: 2    Cholecalciferol (VITAMIN D3 PO), Take by mouth, Disp: , Rfl:     clobetasol (TEMOVATE) 0.05 % external solution, Apply topically 2 (two) times a day, Disp: 50 mL, Rfl: 3    clobetasol (TEMOVATE) 0.05 % ointment, , Disp: , Rfl:     cyanocobalamin (VITAMIN B-12) 100 mcg tablet, Take by mouth daily, Disp: , Rfl:     cycloSPORINE (RESTASIS) 0.05 % ophthalmic emulsion, 1 drop by Tube route 2 (two) times a day, Disp: , Rfl:     doxycycline hyclate (VIBRAMYCIN) 100 mg capsule, Take 1 capsule (100 mg total) by mouth every 12 (twelve) hours for 7 days, Disp: 14 capsule, Rfl: 0    dutasteride (AVODART) 0.5 mg capsule, Take 1 capsule (0.5 mg total) by mouth daily, Disp: 90 capsule, Rfl: 2    Eliquis 2.5 MG, , Disp: , Rfl:     estradiol (ESTRACE) 0.1 mg/g vaginal cream, Insert into the vagina 2 (two) times a week (Patient not taking: Reported on 1/19/2024), Disp: , Rfl:     folic acid (FOLVITE) 1 mg tablet, TAKE ONE TABLET BY MOUTH EVERY DAY WHILE ON METHOTREXATE TO PREVENT SIDE EFFECTS (GENERIC FOR FOLVITE), Disp: 90 tablet, Rfl: 1    Ivermectin (Soolantra) 1 % CREA, Apply topically 1-2 times a day to face area as needed for rosacea., Disp: 45 g, Rfl: 5    Lutein 40 MG CAPS, Take by mouth daily  , Disp: , Rfl:     Magnesium 500 MG CAPS, Take by mouth, Disp: , Rfl:     Magnesium Gluconate 550 MG TABS, Take 30 mg by mouth 2 (two) times a day  , Disp: , Rfl:     meloxicam (MOBIC) 15 mg tablet, , Disp: , Rfl:     metoprolol succinate (TOPROL-XL) 25 mg 24 hr tablet, Take 25 mg by mouth in the morning., Disp: , Rfl:     minoxidil (LONITEN) 2.5 mg tablet, TAKE ONE-HALF TABLET BY MOUTH EVERY DAY -GENERIC FOR LONITEN (Patient not taking: Reported on 2/23/2024), Disp: 90 tablet, Rfl: 2    spironolactone (ALDACTONE) 25 mg tablet, Take 25 mg by mouth daily, Disp: , Rfl:     valACYclovir (VALTREX) 1,000 mg tablet, Take 1 tablet (1,000 mg total) by mouth 2 (two) times a day for 3 days (Patient not  taking: Reported on 10/16/2023), Disp: 6 tablet, Rfl: 0          Whom besides the patient is providing clinical information about today's encounter?   NO ADDITIONAL HISTORIAN (patient alone provided history)    Physical Exam and Assessment/Plan by Diagnosis:      FILLER PROCEDURE NOTE     Product used:     Sculptra  Lot  [id number]  EXP 4/30/24    Diagnosis: volume loss    Location: Bilateral cheeks, laterally and temples and NLFs    Informed consent: Discussed risks (infection, pain, bleeding, bruising, swelling, allergic reaction including anaphylaxis, lumps and bumps, vascular occlusion causing tissue death, loss of vision, undesirable cosmetic result, need for additional treatment, and death) and benefits of the procedure, as well as the alternatives. Informed consent was obtained.     Preparation: The area was cleansed with hibiclens.    Procedure Details:  Using the provided sterile 25G needles, filler was deposited in the subcutaneous tissue and supraperiosteally in above areas using linear threads and depots. Pressure applied to any bleeding. No blanching visible at time of injection. For map of areas injected, see scanned face sheet.     Plan: Tylenol may be used for headache. Call for any problems. Written instructions provided.      Tolerance: well-tolerated; no issues     Complications: none     Dermal Filler aftercare sheet given.    Soft Tissue Fillers Post-Care    Within First Hour after dermal filler treatment:   Immediately after the treatment, there may be redness, bruising, swelling, tenderness, and/or itching near the injection site. This is normal and generally disappears within a few hours to a few days.   In our office, we will provide you with ice and suggest icing for about 5-10 min after the treatment.     Within Six Hours of the Treatment:   Avoid drinking alcohol or strenuous exercise, which may result in additional bruising. Until the swelling and redness have resolved, do not expose the  area to intense heat, such a sunbathing, tanning, saunas, hot tubs, or hot wax. Also avoid extreme cold, such as skiing or hiking outdoors.   Ice Packs can be applied periodically to reduce swelling. You may also take pain medications, such as acetaminophen (Tylenol) or Ibuprofen (Advil, Motrin, etc.). However, taking Ibuprofen or aspirin may exacerbate bruising, as they have blood thinning properties.   To help alleviate bruising, you can try topical application of Arnica, a natural ointment commonly used to reduce bruising. You can find Arnica in the natural foods section of your grocery store, or at local pharmacies. You can also supplement with Bromelin or eat pineapple to help process bruising faster.  If you have a lot of bruising, please let us know, we can offer treatment with a laser called PDL (located at our [facility] location) to help speed the resolution of the bruising.    Minimize movement of the treated area. However, if there is a visible bump, you can massage the area. The product will soften into the skin naturally within a few days.   Sunscreen and makeup can be applied, and the area can be gently washed with a gentle cleanser.      Remember that dermal fillers are long lasting but not permanent. You always have a choice to continue or change the combination of treatments - and the procedure can be repeated as often as you like.   Remember to massage 5 times a day for 5 minutes for the first 5 days to avoid nodules.       Pt paid 800.00 for 1 vial.    DO NOT BILL INSURANCE     Scribe Attestation      I,:  Treasure Watkins MA am acting as a scribe while in the presence of the attending physician.:       I,:  Gloria Shoemaker MD personally performed the services described in this documentation    as scribed in my presence.:

## 2024-03-06 NOTE — RESULT ENCOUNTER NOTE
DERMATOPATHOLOGY RESULT NOTE     Results reviewed by ordering physician.   Called patient to personally discuss results.       Instructions for Clinical Derm Team:   (remember to route Result Note to appropriate staff):    Call patient and schedule for a FOLLOW UP WITH DR. 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   Plan: continue using clobetasol lotion       [id number]   Order: [id number]   Status: Final result      Visible to patient: Yes (seen)      Dx: Hair loss    1 Result Note     Component   Case Report   Surgical Pathology Report                         Case: [id number]                                  Authorizing Provider:  JAY Gonzalez Collected:           02/23/2024 4201              Ordering Location:     [facility]      Received:            02/23/2024 [address] [place] [address]                                                                      Pathologist:           Krissy Lentz MD                                                      Specimen:    Skin, Other, A: right temporal scalp                                                    Final Diagnosis   A. Skin, right temporal scalp, punch biopsy:   Non-cicatricial, non-inflammatory alopecia. (See comment)   Significant atrophy of sebaceous glands.     Comment: Sections demonstrate epidermal atrophy, a decreased total number of hairs, several streamers in the deep compartment of the biopsy underlying catagen/telogen hairs, and an increased/catagen telogen percentage. Due to sectioning, precise hair counts and catagen/telogen percentages are difficult to quantify. Dense or deep inflammation, including vacuolar/interface alteration as typically seen in lichen planopilaris is absent. Significant sebaceous gland atrophy is present.     The notable sebaceous gland atrophy is most  "consistent with psoriatic alopecia or seborrheic dermatitis with a possible component of telogen effluvium. Epidermal changes diagnostic of psoriasis, or the degree of miniaturization diagnostic of androgenetic alopecia are not identified in examined sections. Clinical correlation is required.     PAS, PASD, and colloidal iron stains were reviewed on both tissue blocks.     If symptoms persist or progress, additional biopsies are suggested.       Electronically signed by Krissy Lentz MD on 3/5/2024 at  2:24 PM   Additional Information   All reported additional testing was performed with appropriately reactive controls.  These tests were developed and their performance characteristics determined by [facility] or appropriate performing facility, though some tests may be performed on tissues which have not been validated for performance characteristics (such as staining performed on alcohol exposed cell blocks and decalcified tissues).  Results should be interpreted with caution and in the context of the patients' clinical condition. These tests may not be cleared or approved by the U.S. Food and Drug Administration, though the FDA has determined that such clearance or approval is not necessary. These tests are used for clinical purposes and they should not be regarded as investigational or for research. This laboratory has been approved by CLIA 88, designated as a high-complexity laboratory and is qualified to perform these tests.   .   Gross Description   A. The specimen is received in formalin, labeled with the patient's name and hospital number, and is designated \" right temporal scalp\".  The specimen consists of a punch biopsy of tan hairbearing skin measuring 4 mm in diameter by 5 mm in depth.  First, the epidermal surface is shaved off and that disc is bisected perpendicular to the skin surface and submitted between sponges in cassette A1.  The remainder of the specimen is serially " sectioned parallel to the skin surface and submitted between sponges in cassette A2.  Cut surfaces are inked red.     Note: The estimated total formalin fixation time based upon information provided by the submitting clinician and the standard processing schedule is over 72 hours.  RRavotti   Clinical Information   ATTN: DR GOLDSMITH     SPECIMEN A: 84 yo female; scalp erythema associated with hair loss; hairline; punch biopsy in formalin; DDX: lichen plano pilaris vs AGA     SEND FOR HORIZONTAL AND VERTICAL SECTIONS   Resulting Agency [facility]     Specimen Collected: 02/23/24  3:19 PM Last Resulted: 03/05/24  2:24 PM

## 2024-03-06 NOTE — RESULT ENCOUNTER NOTE
DERMATOPATHOLOGY RESULT NOTE    Results reviewed by ordering physician.  Called patient to personally discuss results.       Instructions for Clinical Derm Team:   (remember to route Result Note to appropriate staff):    Call patient and schedule for a FOLLOW UP WITH DR. 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 component of telogen effluvium   Plan: continue using clobetasol lotion       [id number]  Order: [id number]  Status: Final result      Visible to patient: Yes (seen)      Dx: Hair loss    1 Result Note     Component   Case Report  Surgical Pathology Report                         Case: [id number]                                  Authorizing Provider:  JAY Gonzalez Collected:           02/23/2024 5633              Ordering Location:     [facility]      Received:            02/23/2024 [address] [place] [address]                                                                      Pathologist:           Krissy Lentz MD                                                      Specimen:    Skin, Other, A: right temporal scalp                                                    Final Diagnosis  A. Skin, right temporal scalp, punch biopsy:  Non-cicatricial, non-inflammatory alopecia. (See comment)  Significant atrophy of sebaceous glands.    Comment: Sections demonstrate epidermal atrophy, a decreased total number of hairs, several streamers in the deep compartment of the biopsy underlying catagen/telogen hairs, and an increased/catagen telogen percentage. Due to sectioning, precise hair counts and catagen/telogen percentages are difficult to quantify. Dense or deep inflammation, including vacuolar/interface alteration as typically seen in lichen planopilaris is absent. Significant sebaceous gland atrophy is present.     The notable sebaceous gland atrophy is most consistent with  "psoriatic alopecia or seborrheic dermatitis with a possible component of telogen effluvium. Epidermal changes diagnostic of psoriasis, or the degree of miniaturization diagnostic of androgenetic alopecia are not identified in examined sections. Clinical correlation is required.    PAS, PASD, and colloidal iron stains were reviewed on both tissue blocks.    If symptoms persist or progress, additional biopsies are suggested.      Electronically signed by Krissy Lentz MD on 3/5/2024 at  2:24 PM  Additional Information   All reported additional testing was performed with appropriately reactive controls.  These tests were developed and their performance characteristics determined by [facility] or appropriate performing facility, though some tests may be performed on tissues which have not been validated for performance characteristics (such as staining performed on alcohol exposed cell blocks and decalcified tissues).  Results should be interpreted with caution and in the context of the patients' clinical condition. These tests may not be cleared or approved by the U.S. Food and Drug Administration, though the FDA has determined that such clearance or approval is not necessary. These tests are used for clinical purposes and they should not be regarded as investigational or for research. This laboratory has been approved by CLIA 88, designated as a high-complexity laboratory and is qualified to perform these tests.  .  Gross Description   A. The specimen is received in formalin, labeled with the patient's name and hospital number, and is designated \" right temporal scalp\".  The specimen consists of a punch biopsy of tan hairbearing skin measuring 4 mm in diameter by 5 mm in depth.  First, the epidermal surface is shaved off and that disc is bisected perpendicular to the skin surface and submitted between sponges in cassette A1.  The remainder of the specimen is serially sectioned parallel to " the skin surface and submitted between sponges in cassette A2.  Cut surfaces are inked red.    Note: The estimated total formalin fixation time based upon information provided by the submitting clinician and the standard processing schedule is over 72 hours.  RRavotti  Clinical Information   ATTN: DR GOLDSMITH    SPECIMEN A: 82 yo female; scalp erythema associated with hair loss; hairline; punch biopsy in formalin; DDX: lichen plano pilaris vs AGA    SEND FOR HORIZONTAL AND VERTICAL SECTIONS  Resulting Agency [facility]    Specimen Collected: 02/23/24  3:19 PM Last Resulted: 03/05/24  2:24 PM

## 2024-03-06 NOTE — PATIENT INSTRUCTIONS
Soft Tissue Fillers Post-Care    Within First Hour after dermal filler treatment:   Immediately after the treatment, there may be redness, bruising, swelling, tenderness, and/or itching near the injection site. This is normal and generally disappears within a few hours to a few days.   In our office, we will provide you with ice and suggest icing for about 5-10 min after the treatment.     Within Six Hours of the Treatment:   Avoid drinking alcohol or strenuous exercise, which may result in additional bruising. Until the swelling and redness have resolved, do not expose the area to intense heat, such a sunbathing, tanning, saunas, hot tubs, or hot wax. Also avoid extreme cold, such as skiing or hiking outdoors.   Ice Packs can be applied periodically to reduce swelling. You may also take pain medications, such as acetaminophen (Tylenol) or Ibuprofen (Advil, Motrin, etc.). However, taking Ibuprofen or aspirin may exacerbate bruising, as they have blood thinning properties.   To help alleviate bruising, you can try topical application of Arnica, a natural ointment commonly used to reduce bruising. You can find Arnica in the natural foods section of your grocery store, or at local pharmacies. You can also supplement with Bromelin or eat pineapple to help process bruising faster.  If you have a lot of bruising, please let us know, we can offer treatment with a laser called PDL (located at our [facility] location) to help speed the resolution of the bruising.    Minimize movement of the treated area. However, if there is a visible bump, you can massage the area. The product will soften into the skin naturally within a few days.   Sunscreen and makeup can be applied, and the area can be gently washed with a gentle cleanser.      Remember that dermal fillers are long lasting but not permanent. You always have a choice to continue or change the combination of treatments - and the procedure can be repeated as often as you  like.   Hyaluronic acid fillers last anywhere from 6-9 months, depending on the area into which they were injected and your own body's metabolism. Radiesse filler can last 12-18 months.

## 2024-03-08 ENCOUNTER — PATIENT MESSAGE (OUTPATIENT)
Age: 84
End: 2024-03-08

## 2024-03-11 NOTE — TELEPHONE ENCOUNTER
Dr. Ariza,   Patient requesting refill on Dapsone   Patient last got Rx of Dapsone in 2/16/21 please review and advise

## 2024-03-11 NOTE — TELEPHONE ENCOUNTER
I have in her chart that she had an allergic reaction to dapsone - the pill - there is a risk she could have an allergic reaction to the cream as well.  Please see if we can substitute and alternative of is she wants to take the risk.

## 2024-03-13 NOTE — TELEPHONE ENCOUNTER
Called patient and advised; she said that she used the whole tube without issues and would like a refill on it

## 2024-03-14 DIAGNOSIS — L71.9 ROSACEA: Primary | ICD-10-CM

## 2024-03-14 RX ORDER — CLINDAMYCIN PHOSPHATE 10 MG/G
GEL TOPICAL 2 TIMES DAILY
Qty: 60 G | Refills: 1 | Status: SHIPPED | OUTPATIENT
Start: 2024-03-14

## 2024-04-03 ENCOUNTER — TELEPHONE (OUTPATIENT)
Dept: OBGYN CLINIC | Facility: CLINIC | Age: 84
End: 2024-04-03

## 2024-04-03 DIAGNOSIS — Z12.31 ENCOUNTER FOR SCREENING MAMMOGRAM FOR MALIGNANT NEOPLASM OF BREAST: Primary | ICD-10-CM

## 2024-04-03 NOTE — TELEPHONE ENCOUNTER
----- Message from Joselyn Siddiqui sent at 4/3/2024 10:34 AM EDT -----  Regarding: New Mammogram Scripte  Hello,    Pt called in stating that they need a new script order faxed over to [facility] at [phone]. Pt was told that script in chart  and has appt for mammo tomorrow afternoon.

## 2024-04-06 DIAGNOSIS — T88.7XXA SIDE EFFECT OF MEDICATION: ICD-10-CM

## 2024-04-08 RX ORDER — FOLIC ACID 1 MG/1
TABLET ORAL
Qty: 90 TABLET | Refills: 1 | Status: SHIPPED | OUTPATIENT
Start: 2024-04-08

## 2024-04-15 ENCOUNTER — ANNUAL EXAM (OUTPATIENT)
Dept: OBGYN CLINIC | Facility: CLINIC | Age: 84
End: 2024-04-15
Payer: COMMERCIAL

## 2024-04-15 VITALS
BODY MASS INDEX: 21.26 KG/M2 | SYSTOLIC BLOOD PRESSURE: 102 MMHG | HEIGHT: 63 IN | DIASTOLIC BLOOD PRESSURE: 60 MMHG | WEIGHT: 120 LBS

## 2024-04-15 DIAGNOSIS — Z01.419 WELL WOMAN EXAM WITH ROUTINE GYNECOLOGICAL EXAM: Primary | ICD-10-CM

## 2024-04-15 DIAGNOSIS — N95.2 VAGINAL ATROPHY: ICD-10-CM

## 2024-04-15 PROCEDURE — G0101 CA SCREEN;PELVIC/BREAST EXAM: HCPCS | Performed by: OBSTETRICS & GYNECOLOGY

## 2024-04-15 RX ORDER — ESTRADIOL 0.1 MG/G
0.5 CREAM VAGINAL 3 TIMES WEEKLY
Qty: 42.5 G | Refills: 2 | Status: SHIPPED | OUTPATIENT
Start: 2024-04-15

## 2024-04-15 NOTE — PROGRESS NOTES
ASSESSMENT & PLAN:   Diagnoses and all orders for this visit:    Well woman exam with routine gynecological exam    Vaginal atrophy  -     estradiol (ESTRACE) 0.1 mg/g vaginal cream; Insert 0.5 g into the vagina 3 (three) times a week      Discussed using estrace vaginal cream to help with atrophy and dryness. Will be using replens vaginal moisturizer in between using estrace vaginal estrogen. Discussed also using aquaphor barrier emollient on external labia to help reduce irritation and skin breakage with lichen planus.     The following were reviewed in today's visit: ASCCP guidelines (Pap screen not indicated after age 65), STD testing breast self exam, mammography screening ordered, use and side effects of HRT, menopause, osteoporosis, adequate intake of calcium and vitamin D, exercise, healthy diet, and colonoscopy discussed and ordered.    Patient to return to office in yearly for annual exam.     All questions have been answered to her satisfaction.        CC:  Annual Gynecologic Examination  Chief Complaint   Patient presents with    Gynecologic Exam     Pt is here for her annual exam. No pap needed.   3d mammogram 24 wnl  dexa 2023   colonoscopy          HPI: Cierra Mane is a 84 y.o.  who presents for annual gynecologic examination.  She has the following concerns:  Doing kegel exercises and using replens vaginal moisturizer which helps her vaginal atrophy.     Still having some incontinence especially if her bladder becomes too full. Wears a small liner.       Health Maintenance:    Exercise: intermittently  Breast exams/breast awareness: yes  Last mammogram:  - BIRADS 2  Colorectal cancer screenin  DEXA:  - osteopenia of bilateral femoral neck, normal lumbar spine.    Past Medical History:   Diagnosis Date    A-fib (HCC)     Arthritis     High arches     Lichen planus     scalp and vaginal area    Pulmonary arterial hypertension (HCC)     Purpura (HCC)        Past Surgical  History:   Procedure Laterality Date    CATARACT EXTRACTION Right 06/2020    CHOLECYSTECTOMY      TOTAL ABDOMINAL HYSTERECTOMY N/A        Past OB/Gyn History:   No LMP recorded (lmp unknown). Patient is postmenopausal.    Patient is menopausal.   Menopausal symptoms: vaginal atrophy  Last Pap: further pap screening not indicated  History of abnormal Pap smear: no    Patient is not currently sexually active.     Family History  Family History   Problem Relation Age of Onset    Cancer Mother     Heart attack Father     Hypertension Father     Heart disease Sister     Prostate cancer Brother     Heart disease Brother     Atrial fibrillation Brother        Family history of uterine or ovarian cancer: no  Family history of breast cancer: no  Family history of colon cancer: no    Social History:  Social History     Socioeconomic History    Marital status: /Civil Union     Spouse name: Not on file    Number of children: Not on file    Years of education: Not on file    Highest education level: Not on file   Occupational History    Not on file   Tobacco Use    Smoking status: Never    Smokeless tobacco: Never   Vaping Use    Vaping status: Never Used   Substance and Sexual Activity    Alcohol use: Not Currently     Comment: anders     Drug use: Never    Sexual activity: Not Currently     Partners: Male     Birth control/protection: Post-menopausal   Other Topics Concern    Not on file   Social History Narrative    Not on file     Social Determinants of Health     Financial Resource Strain: Not on file   Food Insecurity: Not on file   Transportation Needs: Not on file   Physical Activity: Not on file   Stress: Not on file   Social Connections: Not on file   Intimate Partner Violence: Not on file   Housing Stability: Not on file     Domestic violence screen: negative    Allergies:  Allergies   Allergen Reactions    Albuterol Other (See Comments)     Atrial fibrillation    Afib    Dapsone Facial Swelling    Epinephrine      Mupirocin     Seasonal Ic [Cholestatin] Itching    Cephalosporins Rash       Medications:    Current Outpatient Medications:     b complex vitamins capsule, Take 1 capsule by mouth daily, Disp: , Rfl:     Cholecalciferol (VITAMIN D3 PO), Take by mouth, Disp: , Rfl:     clindamycin 1 % gel, Apply topically 2 (two) times a day, Disp: 60 g, Rfl: 1    clobetasol (TEMOVATE) 0.05 % ointment, , Disp: , Rfl:     cyanocobalamin (VITAMIN B-12) 100 mcg tablet, Take by mouth daily, Disp: , Rfl:     cycloSPORINE (RESTASIS) 0.05 % ophthalmic emulsion, 1 drop by Tube route 2 (two) times a day, Disp: , Rfl:     dutasteride (AVODART) 0.5 mg capsule, Take 1 capsule (0.5 mg total) by mouth daily, Disp: 90 capsule, Rfl: 2    estradiol (ESTRACE) 0.1 mg/g vaginal cream, Insert 0.5 g into the vagina 3 (three) times a week, Disp: 42.5 g, Rfl: 2    folic acid (FOLVITE) 1 mg tablet, TAKE ONE TABLET BY MOUTH EVERY DAY WHILE ON METHOTREXATE TO PREVENT SIDE EFFECTS (GENERIC FOR FOLVITE), Disp: 90 tablet, Rfl: 1    Ivermectin (Soolantra) 1 % CREA, Apply topically 1-2 times a day to face area as needed for rosacea., Disp: 45 g, Rfl: 5    Lutein 40 MG CAPS, Take by mouth daily  , Disp: , Rfl:     Magnesium 500 MG CAPS, Take by mouth, Disp: , Rfl:     Magnesium Gluconate 550 MG TABS, Take 30 mg by mouth 2 (two) times a day  , Disp: , Rfl:     metoprolol succinate (TOPROL-XL) 25 mg 24 hr tablet, Take 25 mg by mouth in the morning., Disp: , Rfl:     spironolactone (ALDACTONE) 25 mg tablet, Take 25 mg by mouth daily, Disp: , Rfl:     calcipotriene (DOVONEX) 0.005 % topical solution, APPLY TO AFFECTED AREA(S) TWO TIMES A DAY (GENERIC DOVONEX) (Patient not taking: Reported on 2/23/2024), Disp: 60 mL, Rfl: 2    valACYclovir (VALTREX) 1,000 mg tablet, Take 1 tablet (1,000 mg total) by mouth 2 (two) times a day for 3 days (Patient not taking: Reported on 10/16/2023), Disp: 6 tablet, Rfl: 0    Review of Systems:  Review of Systems   Constitutional:   "Negative for activity change, appetite change and unexpected weight change.   Respiratory:  Negative for cough and shortness of breath.    Cardiovascular:  Negative for chest pain.   Gastrointestinal:  Negative for abdominal pain, constipation, diarrhea, nausea and vomiting.   Genitourinary:  Positive for frequency. Negative for difficulty urinating, dyspareunia, menstrual problem, pelvic pain, urgency, vaginal bleeding, vaginal discharge and vaginal pain.   Musculoskeletal:  Negative for back pain.   Skin: Negative.    Neurological:  Negative for dizziness, weakness, light-headedness and headaches.   Psychiatric/Behavioral: Negative.           Physical Exam:  /60   Ht 5' 3\" (1.6 m)   Wt 54.4 kg (120 lb)   LMP  (LMP Unknown)   BMI 21.26 kg/m²    Physical Exam  Constitutional:       General: She is not in acute distress.     Appearance: Normal appearance. She is well-developed and normal weight. She is not diaphoretic.   Genitourinary:      Vulva and bladder normal.      No lesions in the vagina.      Genitourinary Comments: Perineum normal in appearance, no lacerations, no ulcerations, no lesions visualized.      Right Labia: No rash, tenderness or lesions.     Left Labia: No tenderness, lesions or rash.     No inguinal adenopathy present in the right or left side.     No vaginal discharge, erythema, tenderness or bleeding.      No vaginal prolapse present.     Severe vaginal atrophy present.       Right Adnexa: not tender, not full and no mass present.     Left Adnexa: not tender, not full and no mass present.     Cervix is nulliparous.      No cervical motion tenderness, discharge, friability, lesion or polyp.      No parametrium nodularity or thickening present.     Uterus is not enlarged, tender or prolapsed.      No uterine mass detected.     Uterus is anteverted.      No urethral prolapse or mass present.      Bladder is not tender.       Pelvic exam was performed with patient in the lithotomy " position.   Rectum:      No tenderness or external hemorrhoid.   Breasts:     Breasts are symmetrical.      Right: No swelling, bleeding, mass, skin change or tenderness.      Left: No swelling, bleeding, mass, skin change or tenderness.   HENT:      Head: Normocephalic and atraumatic.   Neck:      Thyroid: No thyromegaly or thyroid tenderness.   Cardiovascular:      Rate and Rhythm: Normal rate and regular rhythm.      Heart sounds: Normal heart sounds. No murmur heard.     No friction rub.   Pulmonary:      Effort: Pulmonary effort is normal. No respiratory distress.      Breath sounds: Normal breath sounds. No wheezing or rales.   Abdominal:      Palpations: Abdomen is soft. There is no mass.      Tenderness: There is no abdominal tenderness. There is no guarding.   Musculoskeletal:         General: No tenderness. Normal range of motion.      Right lower leg: No edema.      Left lower leg: No edema.   Lymphadenopathy:      Lower Body: No right inguinal adenopathy. No left inguinal adenopathy.   Neurological:      Mental Status: She is alert and oriented to person, place, and time.   Skin:     General: Skin is warm and dry.      Coloration: Skin is not pale.      Findings: No erythema.   Psychiatric:         Mood and Affect: Mood normal.         Behavior: Behavior normal.         Thought Content: Thought content normal.         Judgment: Judgment normal.   Vitals and nursing note reviewed.

## 2024-04-16 ENCOUNTER — OFFICE VISIT (OUTPATIENT)
Age: 84
End: 2024-04-16
Payer: COMMERCIAL

## 2024-04-16 VITALS — TEMPERATURE: 97.9 F | BODY MASS INDEX: 20.73 KG/M2 | WEIGHT: 117 LBS

## 2024-04-16 DIAGNOSIS — L71.9 ROSACEA: Primary | ICD-10-CM

## 2024-04-16 DIAGNOSIS — L21.9 SEBORRHEIC DERMATITIS: ICD-10-CM

## 2024-04-16 PROCEDURE — 99214 OFFICE O/P EST MOD 30 MIN: CPT | Performed by: DERMATOLOGY

## 2024-04-16 NOTE — PATIENT INSTRUCTIONS
"SEBORRHEIC DERMATITIS    Assessment and Plan:  Based on a thorough discussion of this condition and the management approach to it (including a comprehensive discussion of the known risks, side effects and potential benefits of treatment), the patient (scott) agrees to implement the following specific plan:  Wash with Ketoconazole 2 % shampoo straight for 2 weeks once a day  Then once clear for maintenance only wash hair 2-3 times per week with Ketoconazole shampoo     Seborrheic Dermatitis   Seborrheic dermatitis is a common, chronic or relapsing form of eczema/dermatitis that mainly affects the sebaceous, gland-rich regions of the scalp, face, and trunk.  There are infantile and adult forms of seborrhoeic dermatitis. It is sometimes associated with psoriasis and, in that clinical scenario, may be referred to as \"sebo-psoriasis.\"  Seborrheic dermatitis is also known as \"seborrheic eczema.\"  Dandruff (also called \"pityriasis capitis\") is an uninflamed form of seborrhoeic dermatitis. Dandruff presents as bran-like scaly patches scattered within hair-bearing areas of the scalp.  In an infant, this condition may be referred to as \"cradle cap.\"  The cause of seborrheic dermatitis is not completely understood. It is associated with proliferation of various species of the skin commensal Malassezia, in its yeast (non-pathogenic) form. Its metabolites (such as the fatty acids oleic acid, malssezin, and indole-3-carbaldehyde) may cause an inflammatory reaction. Differences in skin barrier lipid content and function may account for individual presentations.    Infantile Seborrheic Dermatitis  Infantile seborrheic dermatitis affects babies under the age of 3 months and usually resolves by 6-12 months of age.  Infantile seborrheic dermatitis causes \"cradle cap\" (diffuse, greasy scaling on scalp). The rash may spread to affect armpit and groin folds (a type of \"napkin dermatitis\").  There may be associated salmon-pink colored " patches that may flake or peel.  The rash in this case is usually not especially itchy, so the baby often appears undisturbed by the rash, even when more generalized.    Adult Seborrheic Dermatitis  Adult seborrheic dermatitis tends to begin in late adolescence; prevalence is greatest in young adults and in the elderly. It is more common in males than in females.    The following factors are sometimes associated with severe adult seborrheic dermatitis:  Oily skin  Familial tendency to seborrhoeic dermatitis or a family history of psoriasis  Immunosuppression: organ transplant recipient, human immunodeficiency virus (HIV) infection and patients with lymphoma  Neurological and psychiatric diseases: Parkinson disease, tardive dyskinesia, depression, epilepsy, facial nerve palsy, spinal cord injury and congenital disorders such as Down syndrome  Treatment for psoriasis with psoralen and ultraviolet A (PUVA) therapy  Lack of sleep  Stressful events.    In adults, seborrheic dermatitis may typically affect the scalp, face (creases around the nose, behind ears, within eyebrows) and upper trunk. Typical clinical features include:  Winter flares, improving in summer following sun exposure  Minimal itch most of the time  Combination oily and dry mid-facial skin  Ill-defined localized scaly patches or diffuse scale in the scalp  Blepharitis; scaly red eyelid margins  Beaver-pink, thin, scaly, and ill-defined plaques in skin folds on both sides of the face  Petal or ring-shaped flaky patches on hair-line and on anterior chest  Rash in armpits, under the breasts, in the groin folds and genital creases  Superficial folliculitis (inflamed hair follicles) on cheeks and upper trunk    Seborrheic dermatitis is diagnosed by its clinical appearance and behavior. Skin biopsy may be helpful but is rarely necessary to make this diagnosis.     ANDROGENETIC ALOPECIA OR FEMALE/MALE PATTERN HAIR LOSS    Assessment and Plan:  Based on a  thorough discussion of this condition and the management approach to it (including a comprehensive discussion of the known risks, side effects and potential benefits of treatment), the patient (family) agrees to implement the following specific plan:    resolved no further treatment needed      ROSACEA    Assessment and Plan:  Based on a thorough discussion of this condition and the management approach to it (including a comprehensive discussion of the known risks, side effects and potential benefits of treatment), the patient (family) agrees to implement the following specific plan:  Restart Doxycycline 20 mg twice daily for 2 months; take with full glass of water and food in am and pm  Spot treat with the Ivermectin     Rosacea is a chronic rash affecting the mid-face including the nose, cheeks, chin, forehead, and eyelids. The incidence is usually greatest between the ages of 30-60 years and is more common in people with fair skin. Common characteristics include redness, telangiectasias, papules and pustules over affected areas. Rosacea may look similar to acne, but there is a lack of comedones. Occasionally the eyes may also be involved in ocular rosacea. In advanced disease, enlargement of the sebaceous glands in the nose, termed rhinophyma, may be present.     Rosacea results in red spots (papules) and sometimes pustules over the face, but unlike acne there are no blackheads, whiteheads, or cystic nodules. Patients often experience increased facial flushing with prominent blood vessels (erythematotelangiectatic rosacea) and dry, sensitive skin. These symptoms are exacerbated by sun exposure, hot or spicy foods, topical steroids and oil-based facial products.     In ocular rosacea, eyelids may be red and sore due to conjunctivitis, keratitis, and episcleritis. If rhinophyma develops due to enlargement of sebaceous glands, the patient may have an enlarged and irregularly shaped nose with prominent pores. In  rosacea that is refractory to treatment, patients can develop persistent redness and swelling of the face due to lymphatic obstruction (Morbihan disease).     Distribution around the cheeks may be confused with the malar or “butterfly rash” of lupus. However, the rash of lupus spares the nasal creases and lacks papules and pustules. If signs of photosensitivity, oral ulcers, arthritis, and kidney dysfunction are present then consider referral to a rheumatologist.     There are many potential causes of rosacea including genetic, environmental, vascular, and inflammatory factors. These include, but are not limited to:  Chronic exposure to ultraviolet radiation   Increased immune responses in the form of cathelicidins that promote vessel dilation and infiltration with white blood cells (neutrophils) into the dermis  Increased matrix metalloproteinases such as collagen and elastase that remodel normal tissue may contribute to inflammation of the skin making it thicker and harder  There is some evidence to suggest that increased numbers of demodex mites on patient skin may contribute to rosacea papules     General Treatment Approach   Avoid exacerbating factors such as heat, spicy foods, and alcohol   Use daily SPF30+ sunscreen and other methods of coverage for sun protection  Use water-based make-up   Avoid applying topical steroids to affected areas as they can cause perioral dermatitis and exacerbate rosacea     Topical Treatment Approach  Metronidazole cream or gel by itself or in combination with oral antibiotics for more severe cases  Azelaic acid cream or lotion is effective for mild inflammatory rosacea when applied twice daily to affected areas  Brimonidine gel and oxymetazoline hydrochloride cream can reduce facial redness temporarily   Ivermectin cream can treat papulopustular rosacea by controlling demodex mites and inflammation   Pimecrolimus cream or tacrolimus ointment twice a day for 2-3 months can help  reduce inflammation    Oral Treatment Approach  Antibiotics such as doxycycline, minocycline, or erythromycin for 1-3 months  Clonidine and carvedilol can help reduce facial flushing and are generally well tolerated. Common side effects include low blood pressure, gastrointestinal upset, dry eyes, blurred vision and low heart rate.   Isotretinoin at low doses can be effective for long term treatment when antibiotics fail. Side effects may make it unsuitable for some patients.   NSAIDs such as diclofenac can help reduce discomfort and redness in the skin.     Procedural/Surgical Treatment Approach   Vascular lasers or intense pulsed light treatment may be used to treat persistent telangiectasia and papulopustular rosacea  Plastic surgery and carbon dioxide lasers may be used to treat rhinophyma

## 2024-04-16 NOTE — PROGRESS NOTES
"[facility] Dermatology Clinic Note     Patient Name: Cierra Mane  Encounter Date: 4/16/24     Have you been cared for by a [facility] Dermatologist in the last 3 years and, if so, which description applies to you?    Yes.  I have been here within the last 3 years, and my medical history has NOT changed since that time.  I am FEMALE/of child-bearing potential.    REVIEW OF SYSTEMS:  Have you recently had or currently have any of the following? No changes in my recent health.   PAST MEDICAL HISTORY:  Have you personally ever had or currently have any of the following?  If \"YES,\" then please provide more detail. No changes in my medical history.   HISTORY OF IMMUNOSUPPRESSION: Do you have a history of any of the following:  Systemic Immunosuppression such as Diabetes, Biologic or Immunotherapy, Chemotherapy, Organ Transplantation, Bone Marrow Transplantation?  No     Answering \"YES\" requires the addition of the dotphrase \"IMMUNOSUPPRESSED\" as the first diagnosis of the patient's visit.   FAMILY HISTORY:  Any \"first degree relatives\" (parent, brother, sister, or child) with the following?    No changes in my family's known health.   PATIENT EXPERIENCE:    Do you want the Dermatologist to perform a COMPLETE skin exam today including a clinical examination under the \"bra and underwear\" areas?  NO  If necessary, do we have your permission to call and leave a detailed message on your Preferred Phone number that includes your specific medical information?  Yes      Allergies   Allergen Reactions   • Albuterol Other (See Comments)     Atrial fibrillation    Afib   • Dapsone Facial Swelling   • Epinephrine    • Mupirocin    • Seasonal Ic [Cholestatin] Itching   • Cephalosporins Rash      Current Outpatient Medications:   •  b complex vitamins capsule, Take 1 capsule by mouth daily, Disp: , Rfl:   •  Cholecalciferol (VITAMIN D3 PO), Take by mouth, Disp: , Rfl:   •  clindamycin 1 % gel, Apply topically 2 (two) times a day, Disp: " 60 g, Rfl: 1  •  cyanocobalamin (VITAMIN B-12) 100 mcg tablet, Take by mouth daily, Disp: , Rfl:   •  cycloSPORINE (RESTASIS) 0.05 % ophthalmic emulsion, 1 drop by Tube route 2 (two) times a day, Disp: , Rfl:   •  doxycycline (PERIOSTAT) 20 MG tablet, Take 1 tablet (20 mg total) by mouth 2 (two) times a day, Disp: 60 tablet, Rfl: 1  •  dutasteride (AVODART) 0.5 mg capsule, Take 1 capsule (0.5 mg total) by mouth daily, Disp: 90 capsule, Rfl: 2  •  estradiol (ESTRACE) 0.1 mg/g vaginal cream, Insert 0.5 g into the vagina 3 (three) times a week, Disp: 42.5 g, Rfl: 2  •  folic acid (FOLVITE) 1 mg tablet, TAKE ONE TABLET BY MOUTH EVERY DAY WHILE ON METHOTREXATE TO PREVENT SIDE EFFECTS (GENERIC FOR FOLVITE), Disp: 90 tablet, Rfl: 1  •  Ivermectin (Soolantra) 1 % CREA, Apply topically 1-2 times a day to face area as needed for rosacea., Disp: 45 g, Rfl: 5  •  ketoconazole (NIZORAL) 2 % shampoo, Wash straight for 2 weeks once a day then once clear for maintenance only wash hair 2-3 times per week, Disp: 120 mL, Rfl: 4  •  Lutein 40 MG CAPS, Take by mouth daily  , Disp: , Rfl:   •  Magnesium 500 MG CAPS, Take by mouth, Disp: , Rfl:   •  Magnesium Gluconate 550 MG TABS, Take 30 mg by mouth 2 (two) times a day  , Disp: , Rfl:   •  metoprolol succinate (TOPROL-XL) 25 mg 24 hr tablet, Take 25 mg by mouth in the morning., Disp: , Rfl:   •  spironolactone (ALDACTONE) 25 mg tablet, Take 25 mg by mouth daily, Disp: , Rfl:   •  calcipotriene (DOVONEX) 0.005 % topical solution, APPLY TO AFFECTED AREA(S) TWO TIMES A DAY (GENERIC DOVONEX) (Patient not taking: Reported on 2/23/2024), Disp: 60 mL, Rfl: 2  •  clobetasol (TEMOVATE) 0.05 % ointment, , Disp: , Rfl:   •  valACYclovir (VALTREX) 1,000 mg tablet, Take 1 tablet (1,000 mg total) by mouth 2 (two) times a day for 3 days (Patient not taking: Reported on 10/16/2023), Disp: 6 tablet, Rfl: 0          Whom besides the patient is providing clinical information about today's encounter?   NO  "ADDITIONAL HISTORIAN (patient alone provided history)    Physical Exam and Assessment/Plan by Diagnosis:    SEBORRHEIC DERMATITIS    Physical Exam:  Anatomic Location Affected:  scalp  Morphological Description: mild erythema   Pertinent Positives:  Pertinent Negatives:    Additional History of Present Condition: confirmed by bx, not responsive to clobetasol  patient notes that she stopped applying the clobetasol about 3-4 weeks ago was making scalp really red and went back to the calcipotriene; she's had 3 PRP treatments with Dr. Shoemaker and has follow up with that in June    Assessment and Plan:  Based on a thorough discussion of this condition and the management approach to it (including a comprehensive discussion of the known risks, side effects and potential benefits of treatment), the patient (scott) agrees to implement the following specific plan:  Wash with Ketoconazole 2 % shampoo straight for 2 weeks once a day  Then once clear for maintenance only wash hair 2-3 times per week with Ketoconazole shampoo     Seborrheic Dermatitis   Seborrheic dermatitis is a common, chronic or relapsing form of eczema/dermatitis that mainly affects the sebaceous, gland-rich regions of the scalp, face, and trunk.  There are infantile and adult forms of seborrhoeic dermatitis. It is sometimes associated with psoriasis and, in that clinical scenario, may be referred to as \"sebo-psoriasis.\"  Seborrheic dermatitis is also known as \"seborrheic eczema.\"  Dandruff (also called \"pityriasis capitis\") is an uninflamed form of seborrhoeic dermatitis. Dandruff presents as bran-like scaly patches scattered within hair-bearing areas of the scalp.  In an infant, this condition may be referred to as \"cradle cap.\"  The cause of seborrheic dermatitis is not completely understood. It is associated with proliferation of various species of the skin commensal Malassezia, in its yeast (non-pathogenic) form. Its metabolites (such as the fatty acids " "oleic acid, malssezin, and indole-3-carbaldehyde) may cause an inflammatory reaction. Differences in skin barrier lipid content and function may account for individual presentations.    Infantile Seborrheic Dermatitis  Infantile seborrheic dermatitis affects babies under the age of 3 months and usually resolves by 6-12 months of age.  Infantile seborrheic dermatitis causes \"cradle cap\" (diffuse, greasy scaling on scalp). The rash may spread to affect armpit and groin folds (a type of \"napkin dermatitis\").  There may be associated salmon-pink colored patches that may flake or peel.  The rash in this case is usually not especially itchy, so the baby often appears undisturbed by the rash, even when more generalized.    Adult Seborrheic Dermatitis  Adult seborrheic dermatitis tends to begin in late adolescence; prevalence is greatest in young adults and in the elderly. It is more common in males than in females.    The following factors are sometimes associated with severe adult seborrheic dermatitis:  Oily skin  Familial tendency to seborrhoeic dermatitis or a family history of psoriasis  Immunosuppression: organ transplant recipient, human immunodeficiency virus (HIV) infection and patients with lymphoma  Neurological and psychiatric diseases: Parkinson disease, tardive dyskinesia, depression, epilepsy, facial nerve palsy, spinal cord injury and congenital disorders such as Down syndrome  Treatment for psoriasis with psoralen and ultraviolet A (PUVA) therapy  Lack of sleep  Stressful events.    In adults, seborrheic dermatitis may typically affect the scalp, face (creases around the nose, behind ears, within eyebrows) and upper trunk. Typical clinical features include:  Winter flares, improving in summer following sun exposure  Minimal itch most of the time  Combination oily and dry mid-facial skin  Ill-defined localized scaly patches or diffuse scale in the scalp  Blepharitis; scaly red eyelid margins  [place]-pink, " thin, scaly, and ill-defined plaques in skin folds on both sides of the face  Petal or ring-shaped flaky patches on hair-line and on anterior chest  Rash in armpits, under the breasts, in the groin folds and genital creases  Superficial folliculitis (inflamed hair follicles) on cheeks and upper trunk    Seborrheic dermatitis is diagnosed by its clinical appearance and behavior. Skin biopsy may be helpful but is rarely necessary to make this diagnosis.     ANDROGENETIC ALOPECIA OR FEMALE/MALE PATTERN HAIR LOSS    Physical Exam:  Anatomic Location Affected:  scalp  Morphological Description:  mild erythema   Pertinent Positives:  Pertinent Negatives:    Additional information:  she's had 3 PRP treatments with Dr. Shoemaker and has follow up with that in June    Assessment and Plan:  Based on a thorough discussion of this condition and the management approach to it (including a comprehensive discussion of the known risks, side effects and potential benefits of treatment), the patient (family) agrees to implement the following specific plan:    resolved no further treatment needed      ROSACEA    Physical Exam:  Anatomic Location Affected:  face  Morphological Description:  Telangiectasia with inflammatory papules  Pertinent Positives:  Pertinent Negatives:    Additional History of Present Condition:  patient applying sunscreen and Ivermectin     Assessment and Plan:  Based on a thorough discussion of this condition and the management approach to it (including a comprehensive discussion of the known risks, side effects and potential benefits of treatment), the patient (family) agrees to implement the following specific plan:  Restart Doxycycline 20 mg twice daily for 2 months; take with full glass of water and food in am and pm  Spot treat with the Ivermectin     Rosacea is a chronic rash affecting the mid-face including the nose, cheeks, chin, forehead, and eyelids. The incidence is usually greatest between the ages of  30-60 years and is more common in people with fair skin. Common characteristics include redness, telangiectasias, papules and pustules over affected areas. Rosacea may look similar to acne, but there is a lack of comedones. Occasionally the eyes may also be involved in ocular rosacea. In advanced disease, enlargement of the sebaceous glands in the nose, termed rhinophyma, may be present.     Rosacea results in red spots (papules) and sometimes pustules over the face, but unlike acne there are no blackheads, whiteheads, or cystic nodules. Patients often experience increased facial flushing with prominent blood vessels (erythematotelangiectatic rosacea) and dry, sensitive skin. These symptoms are exacerbated by sun exposure, hot or spicy foods, topical steroids and oil-based facial products.     In ocular rosacea, eyelids may be red and sore due to conjunctivitis, keratitis, and episcleritis. If rhinophyma develops due to enlargement of sebaceous glands, the patient may have an enlarged and irregularly shaped nose with prominent pores. In rosacea that is refractory to treatment, patients can develop persistent redness and swelling of the face due to lymphatic obstruction (Morbihan disease).     Distribution around the cheeks may be confused with the malar or “butterfly rash” of lupus. However, the rash of lupus spares the nasal creases and lacks papules and pustules. If signs of photosensitivity, oral ulcers, arthritis, and kidney dysfunction are present then consider referral to a rheumatologist.     There are many potential causes of rosacea including genetic, environmental, vascular, and inflammatory factors. These include, but are not limited to:  Chronic exposure to ultraviolet radiation   Increased immune responses in the form of cathelicidins that promote vessel dilation and infiltration with white blood cells (neutrophils) into the dermis  Increased matrix metalloproteinases such as collagen and elastase that  remodel normal tissue may contribute to inflammation of the skin making it thicker and harder  There is some evidence to suggest that increased numbers of demodex mites on patient skin may contribute to rosacea papules     General Treatment Approach   Avoid exacerbating factors such as heat, spicy foods, and alcohol   Use daily SPF30+ sunscreen and other methods of coverage for sun protection  Use water-based make-up   Avoid applying topical steroids to affected areas as they can cause perioral dermatitis and exacerbate rosacea     Topical Treatment Approach  Metronidazole cream or gel by itself or in combination with oral antibiotics for more severe cases  Azelaic acid cream or lotion is effective for mild inflammatory rosacea when applied twice daily to affected areas  Brimonidine gel and oxymetazoline hydrochloride cream can reduce facial redness temporarily   Ivermectin cream can treat papulopustular rosacea by controlling demodex mites and inflammation   Pimecrolimus cream or tacrolimus ointment twice a day for 2-3 months can help reduce inflammation    Oral Treatment Approach  Antibiotics such as doxycycline, minocycline, or erythromycin for 1-3 months  Clonidine and carvedilol can help reduce facial flushing and are generally well tolerated. Common side effects include low blood pressure, gastrointestinal upset, dry eyes, blurred vision and low heart rate.   Isotretinoin at low doses can be effective for long term treatment when antibiotics fail. Side effects may make it unsuitable for some patients.   NSAIDs such as diclofenac can help reduce discomfort and redness in the skin.     Procedural/Surgical Treatment Approach   Vascular lasers or intense pulsed light treatment may be used to treat persistent telangiectasia and papulopustular rosacea  Plastic surgery and carbon dioxide lasers may be used to treat rhinophyma          Scribe Attestation    I,:  Marisela Travis am acting as a scribe while in the presence  of the attending physician.:       I,:  Jessica Fernandez MD personally performed the services described in this documentation    as scribed in my presence.:

## 2024-04-17 RX ORDER — DOXYCYCLINE HYCLATE 20 MG
20 TABLET ORAL 2 TIMES DAILY
Qty: 60 TABLET | Refills: 1 | Status: SHIPPED | OUTPATIENT
Start: 2024-04-17 | End: 2024-07-16

## 2024-04-17 RX ORDER — KETOCONAZOLE 20 MG/ML
SHAMPOO TOPICAL
Qty: 120 ML | Refills: 4 | Status: SHIPPED | OUTPATIENT
Start: 2024-04-17

## 2024-04-19 ENCOUNTER — PATIENT MESSAGE (OUTPATIENT)
Dept: DERMATOLOGY | Facility: CLINIC | Age: 84
End: 2024-04-19

## 2024-05-01 ENCOUNTER — COSMETIC (OUTPATIENT)
Dept: DERMATOLOGY | Facility: CLINIC | Age: 84
End: 2024-05-01

## 2024-05-01 DIAGNOSIS — Z41.1 ENCOUNTER FOR COSMETIC PROCEDURE: Primary | ICD-10-CM

## 2024-05-01 DIAGNOSIS — L98.8 RHYTIDES: ICD-10-CM

## 2024-05-01 PROCEDURE — BOTOX1U PR BOTOX BY THE UNIT: Performed by: STUDENT IN AN ORGANIZED HEALTH CARE EDUCATION/TRAINING PROGRAM

## 2024-05-01 NOTE — PROGRESS NOTES
"[facility] D[facility]at[facility] Clinic Note     Patient Name: Cierra Mane  Encounter Date: 5/1/2024     Have you been cared for by a [facility] Dermatologist in the last 3 years and, if so, which description applies to you?    Yes.  I have been here within the last 3 years, and my medical history has NOT changed since that time.  I am FEMALE/of child-bearing potential.    REVIEW OF SYSTEMS:  Have you recently had or currently have any of the following? No changes in my recent health.   PAST MEDICAL HISTORY:  Have you personally ever had or currently have any of the following?  If \"YES,\" then please provide more detail. No changes in my medical history.   HISTORY OF IMMUNOSUPPRESSION: Do you have a history of any of the following:  Systemic Immunosuppression such as Diabetes, Biologic or Immunotherapy, Chemotherapy, Organ Transplantation, Bone Marrow Transplantation?  No     Answering \"YES\" requires the addition of the dotphrase \"IMMUNOSUPPRESSED\" as the first diagnosis of the patient's visit.   FAMILY HISTORY:  Any \"first degree relatives\" (parent, brother, sister, or child) with the following?    No changes in my family's known health.   PATIENT EXPERIENCE:    Do you want the Dermatologist to perform a COMPLETE skin exam today including a clinical examination under the \"bra and underwear\" areas?  NO  If necessary, do we have your permission to call and leave a detailed message on your Preferred Phone number that includes your specific medical information?  Yes      Allergies   Allergen Reactions    Albuterol Other (See Comments)     Atrial fibrillation    Afib    Dapsone Facial Swelling    Epinephrine     Mupirocin     Seasonal Ic [Cholestatin] Itching    Cephalosporins Rash      Current Outpatient Medications:     b complex vitamins capsule, Take 1 capsule by mouth daily, Disp: , Rfl:     calcipotriene (DOVONEX) 0.005 % topical solution, APPLY TO AFFECTED AREA(S) TWO TIMES A DAY (GENERIC DOVONEX) (Patient not taking: " Reported on 2/23/2024), Disp: 60 mL, Rfl: 2    Cholecalciferol (VITAMIN D3 PO), Take by mouth, Disp: , Rfl:     clindamycin 1 % gel, Apply topically 2 (two) times a day, Disp: 60 g, Rfl: 1    clobetasol (TEMOVATE) 0.05 % ointment, , Disp: , Rfl:     cyanocobalamin (VITAMIN B-12) 100 mcg tablet, Take by mouth daily, Disp: , Rfl:     cycloSPORINE (RESTASIS) 0.05 % ophthalmic emulsion, 1 drop by Tube route 2 (two) times a day, Disp: , Rfl:     doxycycline (PERIOSTAT) 20 MG tablet, Take 1 tablet (20 mg total) by mouth 2 (two) times a day, Disp: 60 tablet, Rfl: 1    dutasteride (AVODART) 0.5 mg capsule, Take 1 capsule (0.5 mg total) by mouth daily, Disp: 90 capsule, Rfl: 2    estradiol (ESTRACE) 0.1 mg/g vaginal cream, Insert 0.5 g into the vagina 3 (three) times a week, Disp: 42.5 g, Rfl: 2    folic acid (FOLVITE) 1 mg tablet, TAKE ONE TABLET BY MOUTH EVERY DAY WHILE ON METHOTREXATE TO PREVENT SIDE EFFECTS (GENERIC FOR FOLVITE), Disp: 90 tablet, Rfl: 1    Ivermectin (Soolantra) 1 % CREA, Apply topically 1-2 times a day to face area as needed for rosacea., Disp: 45 g, Rfl: 5    ketoconazole (NIZORAL) 2 % shampoo, Wash straight for 2 weeks once a day then once clear for maintenance only wash hair 2-3 times per week, Disp: 120 mL, Rfl: 4    Lutein 40 MG CAPS, Take by mouth daily  , Disp: , Rfl:     Magnesium 500 MG CAPS, Take by mouth, Disp: , Rfl:     Magnesium Gluconate 550 MG TABS, Take 30 mg by mouth 2 (two) times a day  , Disp: , Rfl:     metoprolol succinate (TOPROL-XL) 25 mg 24 hr tablet, Take 25 mg by mouth in the morning., Disp: , Rfl:     spironolactone (ALDACTONE) 25 mg tablet, Take 25 mg by mouth daily, Disp: , Rfl:     valACYclovir (VALTREX) 1,000 mg tablet, Take 1 tablet (1,000 mg total) by mouth 2 (two) times a day for 3 days (Patient not taking: Reported on 10/16/2023), Disp: 6 tablet, Rfl: 0        Whom besides the patient is providing clinical information about today's encounter?   NO ADDITIONAL HISTORIAN  (patient alone provided history)    Physical Exam and Assessment/Plan by Diagnosis:    Botox Procedure Note    Screening Questions   Are you pregnant or breastfeeding? no  Do you take aspirin, fish oil or any other blood thinning medicines? no  Have you had an allergic reaction to any injectables before? no  Have you had any surgeries on your face? yes  Have you been diagnosed with a muscle or nerve condition like myasthenia gravis, ALS or Lambert-Eaton syndrome? no    Diagnosis: rhytides    Location: chin    Informed consent: Discussed risks (infection, pain, bleeding, bruising, swelling, allergic reaction, paralysis of nearby muscles, eyelid droop, double vision, neck weakness, difficulty breathing, headache, undesirable cosmetic result, need for additional treatment, and death) and benefits of the procedure, as well as the alternatives. Informed consent was obtained.     Preparation: The area was cleansed with alcohol.    Procedure Details: Botox was injected into the dermis with a 30-gauge needle. Pressure applied to any bleeding.   Lot Number: [id number]  Expiration: 2/2026  Total Units Injected: 5 U chin    Plan: Patient instructed to remain upright for 6 hours. Tylenol may be used for headache. Allow 2 weeks before returning to clinic for additional dosing as needed. Call for any problems. Written instructions provided.     Tolerance: well-tolerated; no issues  Complications: no  Other procedures: no  Photography: yes           THIS IS A COSMETIC PATIENT WHO PAID OUT OF POCKET AT TIME OF VISIT. DO NOT BILL.     TOTAL UNITS USED: 5  TOTAL COST: $75  Normal cost: $15 x # of units    Discussed microneedlign RF, CO2 for fine lines and texture around mouth. Counselled on expectations (improvement but not complete). Discussed lower and mid face lift as option as she continues to be bothered by marionettes and jowls and given her hx of filler as and small amount of fillers we added would not recommend more given it  will make her look too heavy. Pt had facelift decades ago. Discussed taht sometimes lasers can be use after lifts by Plastics as well.    Also discussed lip lines on cutaneous upper lip- belotero and CO2 and botox discussed. Pt deferred anything for now.    Scribe Attestation      I,:  Shahnaz Acosta am acting as a scribe while in the presence of the attending physician.:       I,:  Gloria Shoemaker MD personally performed the services described in this documentation    as scribed in my presence.:

## 2024-05-02 ENCOUNTER — TELEPHONE (OUTPATIENT)
Dept: DERMATOLOGY | Facility: CLINIC | Age: 84
End: 2024-05-02

## 2024-05-02 NOTE — TELEPHONE ENCOUNTER
Pt rec'd Botox 5u for $75 charge @ appt on 5/1 .. our  was not working at time of serivce and we could not take payment, Pt stated when I called her today 5/2/24 that she would go into [facility] and pay the $75 today.... JF

## 2024-05-31 ENCOUNTER — TELEPHONE (OUTPATIENT)
Age: 84
End: 2024-05-31

## 2024-05-31 NOTE — TELEPHONE ENCOUNTER
Received call from patient asking to nancy a Follow up appt for PRP treatment with Dr Shoemaker due to condition getting worst.    Please advise when and where patient should be nancy?

## 2024-05-31 NOTE — TELEPHONE ENCOUNTER
Called patient , no answer , left voice message requesting a call back .   If patient needs prp only she needs to be scheduled with for the medical spa , otherwise needs to be scheduled with derm

## 2024-06-06 ENCOUNTER — PATIENT MESSAGE (OUTPATIENT)
Dept: DERMATOLOGY | Facility: CLINIC | Age: 84
End: 2024-06-06

## 2024-06-21 ENCOUNTER — COSMETIC (OUTPATIENT)
Dept: PLASTIC SURGERY | Facility: CLINIC | Age: 84
End: 2024-06-21

## 2024-06-21 DIAGNOSIS — Z41.1 ENCOUNTER FOR COSMETIC PROCEDURE: Primary | ICD-10-CM

## 2024-06-21 PROCEDURE — BOTOX3 THREE OR MORE AREAS OR GREATER THAN 75 UNITS: Performed by: STUDENT IN AN ORGANIZED HEALTH CARE EDUCATION/TRAINING PROGRAM

## 2024-07-12 ENCOUNTER — TELEPHONE (OUTPATIENT)
Age: 84
End: 2024-07-12

## 2024-07-12 NOTE — TELEPHONE ENCOUNTER
Caller: Patient     Doctor: Gibran     Reason for call: Patient has some questions regarding her orthotics     Please advise     Call back#: [phone]

## 2024-07-15 ENCOUNTER — TELEPHONE (OUTPATIENT)
Age: 84
End: 2024-07-15

## 2024-07-15 NOTE — TELEPHONE ENCOUNTER
SW pt, she had concerns regarding leg length and inserts. Per Dr. Leary she can try a lifted insert. I then told pt that if she had more concerns, I recommend making an appt, which pt then agreed and appt was made.

## 2024-07-16 ENCOUNTER — OFFICE VISIT (OUTPATIENT)
Age: 84
End: 2024-07-16
Payer: COMMERCIAL

## 2024-07-16 VITALS
BODY MASS INDEX: 20.73 KG/M2 | HEART RATE: 65 BPM | SYSTOLIC BLOOD PRESSURE: 152 MMHG | HEIGHT: 63 IN | DIASTOLIC BLOOD PRESSURE: 77 MMHG | WEIGHT: 117 LBS

## 2024-07-16 DIAGNOSIS — M25.572 PAIN, JOINT, ANKLE AND FOOT, LEFT: Primary | ICD-10-CM

## 2024-07-16 DIAGNOSIS — L60.1 TRAUMATIC ONYCHOLYSIS: ICD-10-CM

## 2024-07-16 DIAGNOSIS — M21.70 LOWER LIMB LENGTH DIFFERENCE: ICD-10-CM

## 2024-07-16 PROCEDURE — 99213 OFFICE O/P EST LOW 20 MIN: CPT | Performed by: STUDENT IN AN ORGANIZED HEALTH CARE EDUCATION/TRAINING PROGRAM

## 2024-07-16 NOTE — PATIENT INSTRUCTIONS
Jadyn Height Increase Insoles for Men Women, Honeycomb Shock Absorbing Cushion Insoles, Replacement Full Length Sports Shoe Height Inserts Height Elevation    Please purchase the 1.5cm insert. Wear this in your right shoe after removing all other inserts. In your left shoe, you can wear your custom molded insert, or another of your choosing.

## 2024-07-16 NOTE — PROGRESS NOTES
[facility] Office Visit    ASSESSMENT     Diagnoses and all orders for this visit:    Pain, joint, ankle and foot, left    Lower limb length difference         Problem List Items Addressed This Visit    None  Visit Diagnoses       Pain, joint, ankle and foot, left    -  Primary    Lower limb length difference              PLAN  -Cierra and I discussed her bilateral feet  -Limb length measurements performed manually today, I did discuss with the patient that the most accurate method of assessing limb length is a CT scanogram.  She would like to go off of my measurements for now and see if she can find an acceptable insert.  -I did recommend various inserts for the patient and provided her with information on these.  She will purchase them on [employer].  I instructed her to wear the height increasing insert to her right foot which measured about three quarters of an inch shorter compared to the left lower extremity.  -Return to clinic 3 months.    SUBJECTIVE    Chief Complaint:  Suspected unequal limb lengths.     Patient ID: Cierra Mane     7/16/2024: Cierra is a pleasant 84-year-old female who presents today for evaluation of her bilateral lower extremities.  She states that she believes her right leg is shorter than her left and she feels as though she has imbalance.  She states that this is not overall cause her much pain, but she would like to have this evaluated for reasons of stability.  Also of note, she states that her left big toenail fell off about 4 weeks ago, she would like to have this evaluated to make sure it is growing incorrectly.        The following portions of the patient's history were reviewed and updated as appropriate: allergies, current medications, past family history, past medical history, past social history, past surgical history and problem list.    Review of Systems   Constitutional: Negative.    HENT: Negative.     Respiratory: Negative.     Cardiovascular:  "Negative.    Gastrointestinal: Negative.    Musculoskeletal:  Positive for arthralgias.   Skin: Negative.    Neurological: Negative.          OBJECTIVE      /77   Pulse 65   Ht 5' 3\" (1.6 m)   Wt 53.1 kg (117 lb)   LMP  (LMP Unknown)   BMI 20.73 kg/m²        Physical Exam  Constitutional:       Appearance: Normal appearance.   HENT:      Head: Normocephalic and atraumatic.   Eyes:      General:         Right eye: No discharge.         Left eye: No discharge.   Cardiovascular:      Rate and Rhythm: Normal rate and regular rhythm.      Pulses:           Dorsalis pedis pulses are 2+ on the right side and 2+ on the left side.        Posterior tibial pulses are 2+ on the right side and 2+ on the left side.   Pulmonary:      Effort: Pulmonary effort is normal.      Breath sounds: Normal breath sounds.   Skin:     General: Skin is warm.      Capillary Refill: Capillary refill takes less than 2 seconds.   Neurological:      Sensory: Sensation is intact. No sensory deficit.           Vascular:  -DP and PT pulses intact b/l  -Capillary refill time <2 sec b/l  -Digital hair growth: Present  -Skin temp: WNL    MSK:  -Pain on palpation negative  -Pelvis height appears equal bilaterally on standing examination  -Limb lengths measured from ASIS to medial malleolus reveal length of right lower extremity being 33 inches, left lower extremity 33-3/4 inches.  -No gross deformities noted  -MMT is 5/5 to all muscle compartments of the lower extremity    Derm:  -No lesions, abrasions, or open wounds noted  -No noted interdigital maceration, peeling, malodor  -No callus formation noted on exam  -Left hallux nail is grown approximately 50% distally into the nailbed.  The nail appears thin and of normal coloration, it is well attached to the underlying nailbed.  "

## 2024-07-19 ENCOUNTER — COSMETIC (OUTPATIENT)
Dept: PLASTIC SURGERY | Facility: CLINIC | Age: 84
End: 2024-07-19

## 2024-07-19 VITALS
BODY MASS INDEX: 20.84 KG/M2 | HEART RATE: 67 BPM | WEIGHT: 117.6 LBS | SYSTOLIC BLOOD PRESSURE: 154 MMHG | DIASTOLIC BLOOD PRESSURE: 84 MMHG | HEIGHT: 63 IN | TEMPERATURE: 97 F

## 2024-07-19 DIAGNOSIS — Z41.1 ENCOUNTER FOR COSMETIC PROCEDURE: Primary | ICD-10-CM

## 2024-07-19 PROCEDURE — BOTOX3 THREE OR MORE AREAS OR GREATER THAN 75 UNITS: Performed by: STUDENT IN AN ORGANIZED HEALTH CARE EDUCATION/TRAINING PROGRAM

## 2024-07-19 NOTE — PROGRESS NOTES
PRS note    Patient presents for second application of scalp PRP.    Patient did well last visit.    No significant changes from first injection as expected.  Discussed that it would take 3-5 months to see hair thickening due to growth phases of hair follicles. Patient acknowledged.    Blood was drawn and PRP was prepared.    Exam:  Mod female-pattern hair loss and thinning particularly in the  frontal, central part of the scalp, and vertex     A total of 7.2 cc of PRP was injected into the scalp frontal, crown, vertex region of the scalp.    Patient tolerated the procedure well without complications.    F/U in 1 month for third injection      Franklin De La Garza MD   [facility]   [address] [place] [address], [address]   [place] [address]   Office: [phone]

## 2024-07-23 ENCOUNTER — TELEPHONE (OUTPATIENT)
Age: 84
End: 2024-07-23

## 2024-07-23 NOTE — TELEPHONE ENCOUNTER
Pt calling to check on the status of her urine testing she provided yesterday at [facility]. Informed pt we unfortunately do not yet have the results. Pt requesting a call back from the clinical team once received and reviewed.     Call back: [phone]

## 2024-07-24 LAB
BACTERIA UR CULT: NORMAL
Lab: NORMAL

## 2024-07-25 NOTE — TELEPHONE ENCOUNTER
Notified Cierra that she does not have a UTI - she  states that she had 2 left ABX and took them before she gave sample.  She states she is feeling better

## 2024-08-08 DIAGNOSIS — L66.1 LICHEN PLANOPILARIS: ICD-10-CM

## 2024-08-08 RX ORDER — CALCIPOTRIENE 0.05 MG/ML
1 SOLUTION TOPICAL 2 TIMES DAILY
Qty: 60 ML | Refills: 0 | Status: SHIPPED | OUTPATIENT
Start: 2024-08-08

## 2024-08-08 NOTE — TELEPHONE ENCOUNTER
Reason for call:   [x] Refill   [] Prior Auth  [] Other:     Office:   [] PCP/Provider -   [x] Specialty/Provider - [facility]  Authorized By: Jessica Fernandez MD    Medication: calcipotriene (DOVONEX) 0.005 % topical solution     Dose/Frequency: APPLY TO AFFECTED AREA(S) TWO TIMES A DAY (GENERIC DOVONEX),     Quantity: 60 mL     Pharmacy: [id number] - [place], [place] - [address] [place] [address]      Does the patient have enough for 3 days?   [] Yes   [x] No - Send as HP to POD

## 2024-09-12 ENCOUNTER — COSMETIC (OUTPATIENT)
Dept: PLASTIC SURGERY | Facility: CLINIC | Age: 84
End: 2024-09-12

## 2024-09-12 VITALS
DIASTOLIC BLOOD PRESSURE: 81 MMHG | WEIGHT: 117 LBS | BODY MASS INDEX: 20.73 KG/M2 | HEIGHT: 63 IN | SYSTOLIC BLOOD PRESSURE: 152 MMHG

## 2024-09-12 DIAGNOSIS — Z41.1 ENCOUNTER FOR COSMETIC PROCEDURE: Primary | ICD-10-CM

## 2024-09-12 PROCEDURE — BOTOX1U PR BOTOX BY THE UNIT: Performed by: STUDENT IN AN ORGANIZED HEALTH CARE EDUCATION/TRAINING PROGRAM

## 2024-09-12 PROCEDURE — BOTOX1 ONE AREA OR 25 UNITS: Performed by: STUDENT IN AN ORGANIZED HEALTH CARE EDUCATION/TRAINING PROGRAM

## 2024-09-12 PROCEDURE — BOTOX3 THREE OR MORE AREAS OR GREATER THAN 75 UNITS: Performed by: STUDENT IN AN ORGANIZED HEALTH CARE EDUCATION/TRAINING PROGRAM

## 2024-09-12 NOTE — PROGRESS NOTES
Botox Consult     First time?: no, had with other providers  Allergies   Allergen Reactions    Albuterol Other (See Comments)     Atrial fibrillation    Afib    Dapsone Facial Swelling    Epinephrine     Mupirocin     Seasonal Ic [Cholestatin] Itching    Cephalosporins Rash       Blood thinners: no   Pregnant: no  Neuromuscular conditions: none    Patient has had botox, interested in maintenance. Particular areas of concern: crows feet. Would like lateral brow lift.     Will proceed with 20 units of botox     Risks and benefits discussed, patient agreed to proceed.     10 units to each crows feet     Total 20 units, 20% off     Patient tolerated well, f/u in 3 months        Franklin De La Garza MD   [facility]   [address], [address]   [place] [address]   Office: [phone]

## 2024-09-12 NOTE — PROGRESS NOTES
PRS note     Patient presents for third application of scalp PRP.     Patient did well last visit.     Patient has still noted some hair loss but has noted some stressors in her life which may have contributed to telogen eflluvium.  Again, reitierated and discussed that it would take 3-5 months to see hair thickening due to growth phases of hair follicles. Patient acknowledged.     Blood was drawn and PRP was prepared.     Exam:  Mod female-pattern hair loss and thinning particularly in the frontal, central part of the scalp, and vertex      A total of 6.4 cc of PRP was injected into the scalp frontal, crown, vertex region of the scalp.     Patient tolerated the procedure well without complications.     F/U in 1-2 months for evaluation, possible further injection.        Franklin De La Garza MD   [facility]   [address] [place] [address], [address]   [place] [address]   Office: [phone]

## 2024-10-14 ENCOUNTER — TELEPHONE (OUTPATIENT)
Dept: DERMATOLOGY | Facility: CLINIC | Age: 84
End: 2024-10-14

## 2024-12-09 ENCOUNTER — COSMETIC (OUTPATIENT)
Age: 84
End: 2024-12-09

## 2024-12-09 ENCOUNTER — TELEPHONE (OUTPATIENT)
Age: 84
End: 2024-12-09

## 2024-12-09 ENCOUNTER — OFFICE VISIT (OUTPATIENT)
Age: 84
End: 2024-12-09
Payer: COMMERCIAL

## 2024-12-09 VITALS — HEIGHT: 63 IN | TEMPERATURE: 98.6 F | WEIGHT: 119 LBS | BODY MASS INDEX: 21.09 KG/M2

## 2024-12-09 DIAGNOSIS — Z41.1 ENCOUNTER FOR COSMETIC PROCEDURE: Primary | ICD-10-CM

## 2024-12-09 DIAGNOSIS — L82.1 SEBORRHEIC KERATOSES: ICD-10-CM

## 2024-12-09 DIAGNOSIS — Q82.8 ACCESSORY SKIN TAGS: ICD-10-CM

## 2024-12-09 DIAGNOSIS — B07.9 VERRUCA: Primary | ICD-10-CM

## 2024-12-09 PROCEDURE — 17110 DESTRUCTION B9 LES UP TO 14: CPT | Performed by: DERMATOLOGY

## 2024-12-09 PROCEDURE — LESIONRML10 LESION REMOVAL FIRST 10: Performed by: DERMATOLOGY

## 2024-12-09 NOTE — PROGRESS NOTES
"[facility] Note     Patient Name: Cierra Mane  Encounter Date: 12/9/24     Have you been cared for by a [facility] Dermatologist in the last 3 years and, if so, which description applies to you?    Yes.  I have been here within the last 3 years, and my medical history has NOT changed since that time.  I am FEMALE/of child-bearing potential.    REVIEW OF SYSTEMS:  Have you recently had or currently have any of the following? No changes in my recent health.   PAST MEDICAL HISTORY:  Have you personally ever had or currently have any of the following?  If \"YES,\" then please provide more detail. No changes in my medical history.   HISTORY OF IMMUNOSUPPRESSION: Do you have a history of any of the following:  Systemic Immunosuppression such as Diabetes, Biologic or Immunotherapy, Chemotherapy, Organ Transplantation, Bone Marrow Transplantation or Prednisone?  No     Answering \"YES\" requires the addition of the dotphrase \"IMMUNOSUPPRESSED\" as the first diagnosis of the patient's visit.   FAMILY HISTORY:  Any \"first degree relatives\" (parent, brother, sister, or child) with the following?    No changes in my family's known health.   PATIENT EXPERIENCE:    Do you want the Dermatologist to perform a COMPLETE skin exam today including a clinical examination under the \"bra and underwear\" areas?  Yes  If necessary, do we have your permission to call and leave a detailed message on your Preferred Phone number that includes your specific medical information?  Yes      Allergies   Allergen Reactions    Albuterol Other (See Comments)     Atrial fibrillation    Afib    Dapsone Facial Swelling    Epinephrine     Mupirocin     Seasonal Ic [Cholestatin] Itching    Cephalosporins Rash      Current Outpatient Medications:     b complex vitamins capsule, Take 1 capsule by mouth daily, Disp: , Rfl:     calcipotriene (DOVONEX) 0.005 % topical solution, Apply 1 Application topically 2 (two) times a day, Disp: 60 mL, Rfl: 0    " "Cholecalciferol (VITAMIN D3 PO), Take by mouth, Disp: , Rfl:     cyanocobalamin (VITAMIN B-12) 100 mcg tablet, Take by mouth daily, Disp: , Rfl:     cycloSPORINE (RESTASIS) 0.05 % ophthalmic emulsion, 1 drop by Tube route 2 (two) times a day, Disp: , Rfl:     dutasteride (AVODART) 0.5 mg capsule, Take 1 capsule (0.5 mg total) by mouth daily, Disp: 90 capsule, Rfl: 2    estradiol (ESTRACE) 0.1 mg/g vaginal cream, Insert 0.5 g into the vagina 3 (three) times a week, Disp: 42.5 g, Rfl: 2    folic acid (FOLVITE) 1 mg tablet, Take 1 tablet (1 mg total) by mouth daily, Disp: 90 tablet, Rfl: 1    Ivermectin (Soolantra) 1 % CREA, Apply topically 1-2 times a day to face area as needed for rosacea., Disp: 45 g, Rfl: 5    Lutein 40 MG CAPS, Take by mouth daily  , Disp: , Rfl:     Magnesium 500 MG CAPS, Take by mouth, Disp: , Rfl:     Magnesium Gluconate 550 MG TABS, Take 30 mg by mouth 2 (two) times a day  , Disp: , Rfl:     metoprolol succinate (TOPROL-XL) 25 mg 24 hr tablet, Take 25 mg by mouth in the morning., Disp: , Rfl:     spironolactone (ALDACTONE) 25 mg tablet, Take 25 mg by mouth daily, Disp: , Rfl:     clindamycin 1 % gel, Apply topically 2 (two) times a day (Patient not taking: Reported on 12/9/2024), Disp: 60 g, Rfl: 1    clobetasol (TEMOVATE) 0.05 % ointment, , Disp: , Rfl:     ketoconazole (NIZORAL) 2 % shampoo, Wash straight for 2 weeks once a day then once clear for maintenance only wash hair 2-3 times per week, Disp: 120 mL, Rfl: 4    valACYclovir (VALTREX) 1,000 mg tablet, Take 1 tablet (1,000 mg total) by mouth 2 (two) times a day for 3 days (Patient not taking: Reported on 10/16/2023), Disp: 6 tablet, Rfl: 0          Whom besides the patient is providing clinical information about today's encounter?   NO ADDITIONAL HISTORIAN (patient alone provided history)    Physical Exam and Assessment/Plan by Diagnosis:    VERRUCA VULGARIS (\"Common Warts\")    Physical Exam:  Anatomic Location Affected:  left " "calf  Morphological Description:  verrucous papule  Pertinent Positives:  Pertinent Negatives:    Additional History of Present Condition:  present for a long time, patient stated the lesion is bothersome    Assessment and Plan:  Based on a thorough discussion of this condition and the management approach to it (including a comprehensive discussion of the known risks, side effects and potential benefits of treatment), the patient (family) agrees to implement the following specific plan:  Treated with liquid nitrogen at the visit today.  Sign consent obtained.    Verruca Vulgaris  A verruca is a common growth of the skin caused by infection by human papilloma virus (HPV). There are many strains of the virus that cause different types of warts on the body. The virus infects the most superficial layers of the skin, causing increased production of skin cells and thickening. Warts can be spread through direct contact with infected skin and may spread to other parts of the body if scratched or picked.     A verruca is more commonly called a \"wart.\" Warts are particularly common in school-aged children but can arise at any age. Patients who have a history of eczema are especially prone due to impaired skin barrier. Those taking immunosuppressive drugs or with HIV infections may experience prolonged symptoms despite treatment.     Warts generally have a rough surface with a tiny black dot sometimes observed in the middle of each scaly spot. They can range in size from a small bump to large scaly growths.  Common warts are often found on the backs of fingers or toes, around the nails, and on the knees. Plantar warts can grow inwardly on the soles of the feet causing pain.    There are many possible ways to treat warts and sometimes several different treatments are needed to get the warts to go away completely. There is no single perfect treatment for warts, and successful treatment can take many months.     In-office " treatments usually require multiple visits, and include:  Cryotherapy. a cold spray with liquid nitrogen will destroy the infected cells but may lead to discomfort and blistering. It may also leave a permanent white santos or scar.      Electrosurgery (curettage and cautery) can be used for large resistant warts which involves shaving the growth down and burning the base. It is performed under local anesthesia and may leave a permanent scar    Candida (“yeast”) antigen injections. These are extracts of the common yeast (Candida) that cannot cause an infection. The medication is injected into/under the wart. It is thought to stimulate the immune system to recognize the wart virus and attack it. Multiple injections are often needed about one month apart.    There are also several at-home wart treatments:    Soak the warts in warm water for 5 minutes every night followed by gentle filing with a nail file or pumice stone.    Topical salicylic acid or similar compounds work by removing the dead surface skin cells  Apply the medicine directly to the wart, wait for it to dry completely, then cover with duct tape overnight   Repeat until the wart is gone, which can take 2-4 months  Do not use on the face or groin area   If the wart paint makes the skin sore, stop treatment until the discomfort has settled, then recommence as above.  Take care to keep the chemical off normal skin.    Podophyllin is a cytotoxic agent used in some products and must not be used in pregnancy or women considering pregnancy.    Some prescription medications include   Topical retinoids (adapalene, tretinoin, tazarotene), 5-fluorouracil (Efudex) or imiquimod (Aldara) creams are sometimes used to treat flat warts or warts on the face and other sensitive anatomical areas. They are usually applied directly to the warts once a day for 2-4 months and can be irritating.  These treatments should only be used as directed by your health care  provider.  Systemic treatment with oral cimetidine (Tagamet) may help boost the immune system against the wart virus in patients, some of the time.  Initiation of cimetidine therapy should ONLY be done under the supervision of your health care provider, who can discuss possible side effects and drug-to-drug interactions of this specific treatment.      PROCEDURE:  DESTRUCTION OF BENIGN LESIONS WITH CRYOTHERAPY  After a thorough discussion of treatment options and risk/benefits/alternatives (including but not limited to local pain, scarring, dyspigmentation, blistering, and possible superinfection), verbal and written consent were obtained and the aforementioned lesions were treated on with cryotherapy using liquid nitrogen x 1 cycle for 5-10 seconds.    TOTAL NUMBER of 1 lesions were treated today on the ANATOMIC LOCATION: left calf.    The patient tolerated the procedure well, and after-care instructions were provided.    SKIN TAG  Physical Exam:  Anatomic Location Affected:  neck  Morphological Description:  skin colored pedunculated papules  Pertinent Positives:  Pertinent Negatives:    Additional History of Present Condition:  not contributory    Assessment and Plan:  Based on a thorough discussion of this condition and the management approach to it (including a comprehensive discussion of the known risks, side effects and potential benefits of treatment), the patient (family) agrees to implement the following specific plan:  Reassured benign  Cosmetic fee charged for removable.  Made aware of out of pocket cost. $20 each or 10 for $150.00  Separate encounter created    SEBORRHEIC KERATOSIS  Physical Exam:  Anatomic Location Affected:  arms  Morphological Description:  flat wart papules  Pertinent Positives:  Pertinent Negatives:    Additional History of Present Condition:  not contributory    Assessment and Plan:  Based on a thorough discussion of this condition and the management approach to it (including a  comprehensive discussion of the known risks, side effects and potential benefits of treatment), the patient (family) agrees to implement the following specific plan:  Reassured benign  Cosmetic fee charged for removable.  Made aware of out of pocket cost. $20 each or 10 for $150.00    Separate encounter created.  Scribe Attestation      I,:  Sarah Dunn MA am acting as a scribe while in the presence of the attending physician.:       I,:  Jessica Fernandez MD personally performed the services described in this documentation    as scribed in my presence.:

## 2024-12-09 NOTE — TELEPHONE ENCOUNTER
Received call from Patient for Follow Up for bump on leg.     Scheduled 12/9/24 at 2:40 pm with Dr. Ariza in [place]. Verfied insurance, provided [place] address.     Patient verbalized understanding.

## 2024-12-09 NOTE — PROGRESS NOTES
[facility] Dermatology COSMETIC Note     Patient Name: Cierra Mane  Encounter Date: 12/9/2024       SEBORRHEIC KERATOSIS      PROCEDURE:  DESTRUCTION OF BENIGN LESIONS WITH CRYOTHERAPY  After a thorough discussion of treatment options and risk/benefits/alternatives (including but not limited to local pain, scarring, dyspigmentation, blistering, and possible superinfection), verbal and written consent were obtained and the aforementioned lesions were treated on with cryotherapy using liquid nitrogen x 1 cycle for 5-10 seconds.    TOTAL NUMBER of 6 lesions were treated today on the ANATOMIC LOCATION: arms.    The patient tolerated the procedure well, and after-care instructions were provided.     PROCEDURE:  DESTRUCTION OF ACROCHORDONS    We again discussed methods of removal including that any procedural treatment may not be covered by insurance and would then be the patient's responsibility:       Location: neck     Description: skin colored pedunculated papules     Number of Lesions Treated: 4     Treatment of lesions chosen: (cryotherapy)     Anesthesia: ( none)     Postop care reviewed and discussed: it was recommended to keep area clean with soap and water and keep area clean        Scribe Attestation      I,:  Sarah Dunn MA am acting as a scribe while in the presence of the attending physician.:       I,:  Jessica Fernandez MD personally performed the services described in this documentation    as scribed in my presence.:          Do not bill insurance payment collected at time of visit.

## 2024-12-31 ENCOUNTER — NURSE TRIAGE (OUTPATIENT)
Age: 84
End: 2024-12-31

## 2024-12-31 DIAGNOSIS — N39.0 URINARY TRACT INFECTION WITHOUT HEMATURIA, SITE UNSPECIFIED: Primary | ICD-10-CM

## 2024-12-31 RX ORDER — CIPROFLOXACIN 500 MG/1
500 TABLET, FILM COATED ORAL EVERY 12 HOURS SCHEDULED
Qty: 14 TABLET | Refills: 0 | Status: SHIPPED | OUTPATIENT
Start: 2024-12-31 | End: 2025-01-07

## 2024-12-31 NOTE — TELEPHONE ENCOUNTER
Patient call disconnect, tried to call patient back and call went to voicemail. Left message for patient to call back.     Please transfer to [facility].    Patient had labs done at [facility] yesterday, used blank script per patient given by AP at previous appt.         Answer Assessment - Initial Assessment Questions  1. When did your symptoms start?   Two days ago   2. Do you experience pain or burning with every void?   -burning with urination   3. Do you have any other urinary symptoms such as urinary frequency, urgency, incontinence, blood in your urine?   -urine frequency and urgency   4. Do you have any abdominal pain or flank pain?  -denies  5. Do you have a fever of 101 or higher? How did you take your temperature?   -Denies    Protocols used: Urology-Dysuria-ADULT-[place]

## 2024-12-31 NOTE — TELEPHONE ENCOUNTER
Called and left detailed message on identified voicemail that prescription Cipro was called into the pharm.    If patient has any questions to please call the office back.    RAINE Mathur RN1 hour ago (12:47 PM)       Antibiotics sent to his/her pharmacy.

## 2024-12-31 NOTE — TELEPHONE ENCOUNTER
Pt called back and stated she was unable to get in touch with [facility], they are closed for the holiday. Pt stated she is hoping to obtain medication today for a suspected UTI prior to everything closing early today. Call connected with [facility] for assistance.

## 2024-12-31 NOTE — TELEPHONE ENCOUNTER
Patient called in stating she is currently experiencing burning sensation, frequency, urgency, and fatigue. Patient completed her urinary testing at [facility] and is hoping medication can be called in today so she doesn't have to suffer over the holiday. Patient states her prefers bactrim or cipro, macrobid does not work. Patient verified [facility] [employer]. Please advise.

## 2024-12-31 NOTE — TELEPHONE ENCOUNTER
Pt called and stated she dropped off urine sample to [facility]. No results have been sent for pt but pt will contact [facility] and provide our fax number due to she is having active symptoms of UTI of burning frequency and tiredness   single

## 2025-01-02 LAB
BACTERIA UR CULT: ABNORMAL
Lab: ABNORMAL
SL AMB ANTIMICROBIAL SUSCEPTIBILITY: ABNORMAL

## 2025-01-20 ENCOUNTER — DOCUMENTATION (OUTPATIENT)
Dept: DERMATOLOGY | Facility: CLINIC | Age: 85
End: 2025-01-20

## 2025-01-20 DIAGNOSIS — Z86.19 H/O COLD SORES: ICD-10-CM

## 2025-01-20 RX ORDER — VALACYCLOVIR HYDROCHLORIDE 1 G/1
1000 TABLET, FILM COATED ORAL 2 TIMES DAILY
Qty: 6 TABLET | Refills: 0 | Status: SHIPPED | OUTPATIENT
Start: 2025-01-20 | End: 2025-01-23

## 2025-03-14 DIAGNOSIS — L66.10 LICHEN PLANOPILARIS: ICD-10-CM

## 2025-03-14 RX ORDER — DUTASTERIDE 0.5 MG/1
0.5 CAPSULE, LIQUID FILLED ORAL DAILY
Qty: 90 CAPSULE | Refills: 1 | Status: SHIPPED | OUTPATIENT
Start: 2025-03-14

## 2025-03-17 ENCOUNTER — OFFICE VISIT (OUTPATIENT)
Age: 85
End: 2025-03-17
Payer: COMMERCIAL

## 2025-03-17 ENCOUNTER — COSMETIC (OUTPATIENT)
Age: 85
End: 2025-03-17

## 2025-03-17 VITALS — TEMPERATURE: 97.3 F | WEIGHT: 117 LBS | BODY MASS INDEX: 20.73 KG/M2

## 2025-03-17 DIAGNOSIS — L82.1 SEBORRHEIC KERATOSES: ICD-10-CM

## 2025-03-17 DIAGNOSIS — L66.10 LICHEN PLANOPILARIS: Primary | ICD-10-CM

## 2025-03-17 DIAGNOSIS — Z41.1 ENCOUNTER FOR COSMETIC PROCEDURE: Primary | ICD-10-CM

## 2025-03-17 PROCEDURE — SKNTGDERM SKIN TAG DERM ADD ON: Performed by: DERMATOLOGY

## 2025-03-17 PROCEDURE — 99214 OFFICE O/P EST MOD 30 MIN: CPT | Performed by: DERMATOLOGY

## 2025-03-17 PROCEDURE — 17110 DESTRUCTION B9 LES UP TO 14: CPT | Performed by: DERMATOLOGY

## 2025-03-17 RX ORDER — FUROSEMIDE 20 MG/1
TABLET ORAL
COMMUNITY
Start: 2025-01-07

## 2025-03-17 NOTE — PROGRESS NOTES
"DO NOT BILL PATIENT !!! PATIENT PAID AT TIME OF SERVICE !!!    SEBORRHEIC KERATOSIS; NON-INFLAMED    Physical Exam:  Anatomic Location Affected:  BACK, abdomen  Morphological Description:  Flat and raised, waxy, smooth to warty textured, yellow to brownish-grey to dark brown to blackish, discrete, \"stuck-on\" appearing papules.  Pertinent Positives:  Pertinent Negatives:    Additional History of Present Condition:  Patient reports new bumps on the skin.  Denies itch, burn, pain, bleeding or ulceration.  Present constantly; nothing seems to make it worse or better.  No prior treatment.      Assessment and Plan:  Based on a thorough discussion of this condition and the management approach to it (including a comprehensive discussion of the known risks, side effects and potential benefits of treatment), the patient (family) agrees to implement the following specific plan:  Liquid nitrogen was applied for 10-12 seconds to the skin lesion and the expected blistering or scabbing reaction explained. Do not pick at the area. Patient reminded to expect hypopigmented scars from the procedure. Return if lesion fails to fully resolve.      Seborrheic Keratosis  A seborrheic keratosis is a harmless warty spot that appears during adult life as a common sign of skin aging.  Seborrheic keratoses can arise on any area of skin, covered or uncovered, with the usual exception of the palms and soles. They do not arise from mucous membranes. Seborrheic keratoses can have highly variable appearance.      Seborrheic keratoses are extremely common. It has been estimated that over 90% of adults over the age of 60 years have one or more of them. They occur in males and females of all races, typically beginning to erupt in the 30s or 40s. They are uncommon under the age of 20 years.  The precise cause of seborrhoeic keratoses is not known.  Seborrhoeic keratoses are considered degenerative in nature. As time goes by, seborrheic keratoses tend to " "become more numerous. Some people inherit a tendency to develop a very large number of them; some people may have hundreds of them.    The name \"seborrheic keratosis\" is misleading, because these lesions are not limited to a seborrhoeic distribution (scalp, mid-face, chest, upper back), nor are they formed from sebaceous glands, nor are they associated with sebum -- which is greasy.  Seborrheic keratosis may also be called \"SK,\" \"Seb K,\" \"basal cell papilloma,\" \"senile wart,\" or \"barnacle.\"      Researchers have noted:  Eruptive seborrhoeic keratoses can follow sunburn or dermatitis  Skin friction may be the reason they appear in body folds  Viral cause (e.g., human papillomavirus) seems unlikely  Stable and clonal mutations or activation of FRFR3, PIK3CA, ANDREEA, AKT1 and EGFR genes are found in seborrhoeic keratoses  Seborrhoeic keratosis can arise from solar lentigo  FRFR3 mutations also arise in solar lentigines. These mutations are associated with increased age and location on the head and neck, suggesting a role of ultraviolet radiation in these lesions  Seborrheic keratoses do not harbour tumour suppressor gene mutations  Epidermal growth factor receptor inhibitors, which are used to treat some cancers, often result in an increase in verrucal (warty) keratoses.    There is no easy way to remove multiple lesions on a single occasion.  Unless a specific lesion is \"inflamed\" and is causing pain or stinging/burning or is bleeding, most insurance companies do not authorize treatment.      PROCEDURE:  DESTRUCTION OF BENIGN LESIONS WITH CRYOTHERAPY  After a thorough discussion of treatment options and risk/benefits/alternatives (including but not limited to local pain, scarring, dyspigmentation, blistering, and possible superinfection), verbal and written consent were obtained and the aforementioned lesions were treated on with cryotherapy using liquid nitrogen x 3 cycle for 5-10 seconds.    TOTAL NUMBER of 4 lesions " were treated today on the ANATOMIC LOCATION: back and abdomen.    The patient tolerated the procedure well, and after-care instructions were provided.         DO NOT BILL PATIENT !!! PATIENT PAID AT TIME OF SERVICE !!!    Scribe Attestation    I,:  Marisela Travis am acting as a scribe while in the presence of the attending physician.:       I,:  Jessica Fernandez MD personally performed the services described in this documentation    as scribed in my presence.:

## 2025-03-17 NOTE — PROGRESS NOTES
"[facility] Note     Patient Name: Cierra Mane  Encounter Date: 3/17/25     Have you been cared for by a [facility] Dermatologist in the last 3 years and, if so, which description applies to you?    Yes.  I have been here within the last 3 years, and my medical history has NOT changed since that time.  I am FEMALE/of child-bearing potential.    REVIEW OF SYSTEMS:  Have you recently had or currently have any of the following? No changes in my recent health.   PAST MEDICAL HISTORY:  Have you personally ever had or currently have any of the following?  If \"YES,\" then please provide more detail. No changes in my medical history.   HISTORY OF IMMUNOSUPPRESSION: Do you have a history of any of the following:  Systemic Immunosuppression such as Diabetes, Biologic or Immunotherapy, Chemotherapy, Organ Transplantation, Bone Marrow Transplantation or Prednisone?  No     Answering \"YES\" requires the addition of the dotphrase \"IMMUNOSUPPRESSED\" as the first diagnosis of the patient's visit.   FAMILY HISTORY:  Any \"first degree relatives\" (parent, brother, sister, or child) with the following?    No changes in my family's known health.   PATIENT EXPERIENCE:    Do you want the Dermatologist to perform a COMPLETE skin exam today including a clinical examination under the \"bra and underwear\" areas?  NO  If necessary, do we have your permission to call and leave a detailed message on your Preferred Phone number that includes your specific medical information?  Yes      Allergies   Allergen Reactions   • Albuterol Other (See Comments)     Atrial fibrillation    Afib   • Dapsone Facial Swelling   • Epinephrine    • Mupirocin    • Seasonal Ic [Cholestatin] Itching   • Cephalosporins Rash      Current Outpatient Medications:   •  b complex vitamins capsule, Take 1 capsule by mouth daily, Disp: , Rfl:   •  calcipotriene (DOVONEX) 0.005 % topical solution, Apply 1 Application topically 2 (two) times a day, Disp: 60 mL, " Rfl: 0  •  Cholecalciferol (VITAMIN D3 PO), Take by mouth, Disp: , Rfl:   •  cyanocobalamin (VITAMIN B-12) 100 mcg tablet, Take by mouth daily, Disp: , Rfl:   •  cycloSPORINE (RESTASIS) 0.05 % ophthalmic emulsion, 1 drop by Tube route 2 (two) times a day, Disp: , Rfl:   •  dutasteride (AVODART) 0.5 mg capsule, TAKE ONE CAPSULE BY MOUTH EVERY DAY, Disp: 90 capsule, Rfl: 1  •  estradiol (ESTRACE) 0.1 mg/g vaginal cream, Insert 0.5 g into the vagina 3 (three) times a week, Disp: 42.5 g, Rfl: 2  •  folic acid (FOLVITE) 1 mg tablet, Take 1 tablet (1 mg total) by mouth daily, Disp: 90 tablet, Rfl: 1  •  furosemide (LASIX) 20 mg tablet, , Disp: , Rfl:   •  Ivermectin (Soolantra) 1 % CREA, Apply topically 1-2 times a day to face area as needed for rosacea., Disp: 45 g, Rfl: 5  •  Lutein 40 MG CAPS, Take by mouth daily  , Disp: , Rfl:   •  Magnesium 500 MG CAPS, Take by mouth, Disp: , Rfl:   •  metoprolol succinate (TOPROL-XL) 25 mg 24 hr tablet, Take 25 mg by mouth in the morning., Disp: , Rfl:   •  spironolactone (ALDACTONE) 25 mg tablet, Take 25 mg by mouth daily, Disp: , Rfl:   •  clindamycin 1 % gel, Apply topically 2 (two) times a day (Patient not taking: Reported on 12/9/2024), Disp: 60 g, Rfl: 1  •  clobetasol (TEMOVATE) 0.05 % ointment, , Disp: , Rfl:   •  ketoconazole (NIZORAL) 2 % shampoo, Wash straight for 2 weeks once a day then once clear for maintenance only wash hair 2-3 times per week, Disp: 120 mL, Rfl: 4  •  Magnesium Gluconate 550 MG TABS, Take 30 mg by mouth 2 (two) times a day   (Patient not taking: Reported on 3/17/2025), Disp: , Rfl:   •  valACYclovir (VALTREX) 1,000 mg tablet, Take 1 tablet (1,000 mg total) by mouth 2 (two) times a day for 3 days, Disp: 6 tablet, Rfl: 0  •  valACYclovir (VALTREX) 1,000 mg tablet, Take 1 tablet (1,000 mg total) by mouth 2 (two) times a day for 5 days Start two days prior to planned procedure, Disp: 10 tablet, Rfl: 2          Whom besides the patient is providing  "clinical information about today's encounter?   NO ADDITIONAL HISTORIAN (patient alone provided history)    Physical Exam and Assessment/Plan by Diagnosis:      SEBORRHEIC KERATOSIS; INFLAMED    Physical Exam:  Anatomic Location Affected:  left lateral forearm  Morphological Description:  Flat and raised, waxy, smooth to warty textured, yellow to brownish-grey to dark brown to blackish, discrete, \"stuck-on\" appearing papules.  Pertinent Positives:  Pertinent Negatives:    Additional History of Present Condition:  Patient reports new bumps on the skin.  Denies itch, burn, pain, bleeding or ulceration.  Present constantly; nothing seems to make it worse or better.  No prior treatment.      Assessment and Plan:  Based on a thorough discussion of this condition and the management approach to it (including a comprehensive discussion of the known risks, side effects and potential benefits of treatment), the patient (family) agrees to implement the following specific plan:  Liquid nitrogen was applied for 10-12 seconds to the skin lesion and the expected blistering or scabbing reaction explained. Do not pick at the area. Patient reminded to expect hypopigmented scars from the procedure. Return if lesion fails to fully resolve.    Seborrheic Keratosis  A seborrheic keratosis is a harmless warty spot that appears during adult life as a common sign of skin aging.  Seborrheic keratoses can arise on any area of skin, covered or uncovered, with the usual exception of the palms and soles. They do not arise from mucous membranes. Seborrheic keratoses can have highly variable appearance.      Seborrheic keratoses are extremely common. It has been estimated that over 90% of adults over the age of 60 years have one or more of them. They occur in males and females of all races, typically beginning to erupt in the 30s or 40s. They are uncommon under the age of 20 years.  The precise cause of seborrhoeic keratoses is not known.  " "Seborrhoeic keratoses are considered degenerative in nature. As time goes by, seborrheic keratoses tend to become more numerous. Some people inherit a tendency to develop a very large number of them; some people may have hundreds of them.    The name \"seborrheic keratosis\" is misleading, because these lesions are not limited to a seborrhoeic distribution (scalp, mid-face, chest, upper back), nor are they formed from sebaceous glands, nor are they associated with sebum -- which is greasy.  Seborrheic keratosis may also be called \"SK,\" \"Seb K,\" \"basal cell papilloma,\" \"senile wart,\" or \"barnacle.\"      Researchers have noted:  Eruptive seborrhoeic keratoses can follow sunburn or dermatitis  Skin friction may be the reason they appear in body folds  Viral cause (e.g., human papillomavirus) seems unlikely  Stable and clonal mutations or activation of FRFR3, PIK3CA, ANDREEA, AKT1 and EGFR genes are found in seborrhoeic keratoses  Seborrhoeic keratosis can arise from solar lentigo  FRFR3 mutations also arise in solar lentigines. These mutations are associated with increased age and location on the head and neck, suggesting a role of ultraviolet radiation in these lesions  Seborrheic keratoses do not harbour tumour suppressor gene mutations  Epidermal growth factor receptor inhibitors, which are used to treat some cancers, often result in an increase in verrucal (warty) keratoses.    There is no easy way to remove multiple lesions on a single occasion.  Unless a specific lesion is \"inflamed\" and is causing pain or stinging/burning or is bleeding, most insurance companies do not authorize treatment.     PROCEDURE:  DESTRUCTION OF BENIGN LESIONS WITH CRYOTHERAPY  After a thorough discussion of treatment options and risk/benefits/alternatives (including but not limited to local pain, scarring, dyspigmentation, blistering, and possible superinfection), verbal and written consent were obtained and the aforementioned lesions were " "treated on with cryotherapy using liquid nitrogen x 3 cycle for 5-10 seconds.    TOTAL NUMBER of 1 lesions were treated today on the ANATOMIC LOCATION:   left lateral forearm,    The patient tolerated the procedure well, and after-care instructions were provided.      SEBORRHEIC KERATOSIS; NON-INFLAMED    Physical Exam:  Anatomic Location Affected:  back, abdomen  Morphological Description:  Flat and raised, waxy, smooth to warty textured, yellow to brownish-grey to dark brown to blackish, discrete, \"stuck-on\" appearing papules.  Pertinent Positives:  Pertinent Negatives:    Additional History of Present Condition:  Patient reports new bumps on the skin.  Denies itch, burn, pain, bleeding or ulceration.  Present constantly; nothing seems to make it worse or better.  No prior treatment.      Assessment and Plan:  Based on a thorough discussion of this condition and the management approach to it (including a comprehensive discussion of the known risks, side effects and potential benefits of treatment), the patient (family) agrees to implement the following specific plan:  Reassured benign    Seborrheic Keratosis  A seborrheic keratosis is a harmless warty spot that appears during adult life as a common sign of skin aging.  Seborrheic keratoses can arise on any area of skin, covered or uncovered, with the usual exception of the palms and soles. They do not arise from mucous membranes. Seborrheic keratoses can have highly variable appearance.      Seborrheic keratoses are extremely common. It has been estimated that over 90% of adults over the age of 60 years have one or more of them. They occur in males and females of all races, typically beginning to erupt in the 30s or 40s. They are uncommon under the age of 20 years.  The precise cause of seborrhoeic keratoses is not known.  Seborrhoeic keratoses are considered degenerative in nature. As time goes by, seborrheic keratoses tend to become more numerous. Some people " "inherit a tendency to develop a very large number of them; some people may have hundreds of them.    The name \"seborrheic keratosis\" is misleading, because these lesions are not limited to a seborrhoeic distribution (scalp, mid-face, chest, upper back), nor are they formed from sebaceous glands, nor are they associated with sebum -- which is greasy.  Seborrheic keratosis may also be called \"SK,\" \"Seb K,\" \"basal cell papilloma,\" \"senile wart,\" or \"barnacle.\"      Researchers have noted:  Eruptive seborrhoeic keratoses can follow sunburn or dermatitis  Skin friction may be the reason they appear in body folds  Viral cause (e.g., human papillomavirus) seems unlikely  Stable and clonal mutations or activation of FRFR3, PIK3CA, ANDREEA, AKT1 and EGFR genes are found in seborrhoeic keratoses  Seborrhoeic keratosis can arise from solar lentigo  FRFR3 mutations also arise in solar lentigines. These mutations are associated with increased age and location on the head and neck, suggesting a role of ultraviolet radiation in these lesions  Seborrheic keratoses do not harbour tumour suppressor gene mutations  Epidermal growth factor receptor inhibitors, which are used to treat some cancers, often result in an increase in verrucal (warty) keratoses.    There is no easy way to remove multiple lesions on a single occasion.  Unless a specific lesion is \"inflamed\" and is causing pain or stinging/burning or is bleeding, most insurance companies do not authorize treatment.     NOTALGIA PARESTHETICA    Physical Exam:  Anatomic Location Affected:  left upper back   Morphological Description:  clear today  Pertinent Positives:  Pertinent Negatives:    Additional History of Present Condition:  patient notes that has chronic itching in the same spot    Assessment and Plan:  Based on a thorough discussion of this condition and the management approach to it (including a comprehensive discussion of the known risks, side effects and potential " "benefits of treatment), the patient (family) agrees to implement the following specific plan:  Reassured benign    The nerves which supply sensation to the upper back emerge from the spinal cord (2nd to 6th thoracic segments) and run a long course up through the thick muscles of the back. They make a right-angled turn before reaching the skin. The nerves appear to be vulnerable to compression or traction. Partial compression or injury leads to the symptoms.   Initial injury to the nerves may include:  Back injury  Herniated or \"slipped\" disc  Herpes zoster (shingles)  Sunburn  Myelopathy (muscular disease)  Small fiber neuropathy    Notalgia paresthetica often starts after a period of intense exercise leading to muscular stiffness, or a period of inactivity. Sometimes, a specific injury may be recalled.       It is characterized by intense itch on the medial border of one scapula or both, ie, between the shoulder blades. This itch can be intermittent or continuous. It is unrelieved by scratching, although the scratching and rubbing may be pleasurable.   The affected area may spread to both shoulder blades and more widely over the back and shoulders.  In many patients, there are no visible signs.   Visible changes often arise from rubbing and scratching the affected area. These include:  Scratch marks  Hyperpigmentation (brown marks)  Hypopigmentation (white marks)  Lichen simplex (a type of eczema)  Scarring    There may be changed sensation in the affected area of skin in response to light touch, sharp objects, cold, and heat. There may be reduced or absent sweating in the affected area.    Radiology such as X-ray, CT scan or MRI may demonstrate a degenerative vertebra or prolapsed disc in the area that corresponds to the nerve supply to the affected skin. In many cases, no abnormality is revealed.     Treatment is not always necessary, and it is not always successful. Typical management may include the " "following:  Cooling lotions or creams as required (camphor and menthol)  Topical steroids to treat associated lichen simplex  Capsaicin cream - this depletes nerve endings of their chemical transmitters but requires frequent reapplication and causes unpleasant local side effects  Local anesthetic creams may provide temporary relief of symptoms  Amitriptyline or other oral tricyclic medications at night to help sleep and counteract neuropathic symptoms  Gabapentin, pregabalin or other anticonvulsants can relieve unpleasant burning or tingling sensations  Transcutaneous electrical nerve stimulation (TENS)  Surgical decompression of vertebral nerve impingement    Physical therapy with repetitive exercises and stretches for the upper back has been reported to be effective.    While sitting, cross arms and bend forwards to stretch upper back  Arms at sides, raise shoulders and rotate them forwards and backwards  Arms straight, rotate forwards 360 degrees and backwards 360 degrees  Rotate upper body left and right until stretch is felt and hold  Massage the muscles besides the spine in the affected area    SEBORRHEIC KERATOSIS; NON-INFLAMED    Physical Exam:  Anatomic Location Affected:  right flank  Morphological Description:  Flat and raised, waxy, smooth to warty textured, yellow to brownish-grey to dark brown to blackish, discrete, \"stuck-on\" appearing papules.  Pertinent Positives:  Pertinent Negatives:    Additional History of Present Condition:  Patient reports new bumps on the skin.  Denies itch, burn, pain, bleeding or ulceration.  Present constantly; nothing seems to make it worse or better.  No prior treatment.      Assessment and Plan:  Based on a thorough discussion of this condition and the management approach to it (including a comprehensive discussion of the known risks, side effects and potential benefits of treatment), the patient (family) agrees to implement the following specific plan:  Reassured " "benign    Seborrheic Keratosis  A seborrheic keratosis is a harmless warty spot that appears during adult life as a common sign of skin aging.  Seborrheic keratoses can arise on any area of skin, covered or uncovered, with the usual exception of the palms and soles. They do not arise from mucous membranes. Seborrheic keratoses can have highly variable appearance.      Seborrheic keratoses are extremely common. It has been estimated that over 90% of adults over the age of 60 years have one or more of them. They occur in males and females of all races, typically beginning to erupt in the 30s or 40s. They are uncommon under the age of 20 years.  The precise cause of seborrhoeic keratoses is not known.  Seborrhoeic keratoses are considered degenerative in nature. As time goes by, seborrheic keratoses tend to become more numerous. Some people inherit a tendency to develop a very large number of them; some people may have hundreds of them.    The name \"seborrheic keratosis\" is misleading, because these lesions are not limited to a seborrhoeic distribution (scalp, mid-face, chest, upper back), nor are they formed from sebaceous glands, nor are they associated with sebum -- which is greasy.  Seborrheic keratosis may also be called \"SK,\" \"Seb K,\" \"basal cell papilloma,\" \"senile wart,\" or \"barnacle.\"      Researchers have noted:  Eruptive seborrhoeic keratoses can follow sunburn or dermatitis  Skin friction may be the reason they appear in body folds  Viral cause (e.g., human papillomavirus) seems unlikely  Stable and clonal mutations or activation of FRFR3, PIK3CA, ANDREEA, AKT1 and EGFR genes are found in seborrhoeic keratoses  Seborrhoeic keratosis can arise from solar lentigo  FRFR3 mutations also arise in solar lentigines. These mutations are associated with increased age and location on the head and neck, suggesting a role of ultraviolet radiation in these lesions  Seborrheic keratoses do not harbour tumour suppressor gene " "mutations  Epidermal growth factor receptor inhibitors, which are used to treat some cancers, often result in an increase in verrucal (warty) keratoses.    There is no easy way to remove multiple lesions on a single occasion.  Unless a specific lesion is \"inflamed\" and is causing pain or stinging/burning or is bleeding, most insurance companies do not authorize treatment.         Scribe Attestation    I,:  Marisela Travis am acting as a scribe while in the presence of the attending physician.:       I,:  Jessica Fernandez MD personally performed the services described in this documentation    as scribed in my presence.:           "

## 2025-03-17 NOTE — PATIENT INSTRUCTIONS
"  SEBORRHEIC KERATOSIS; INFLAMED    Physical Exam:  Anatomic Location Affected:  left lateral forearm  Morphological Description:  Flat and raised, waxy, smooth to warty textured, yellow to brownish-grey to dark brown to blackish, discrete, \"stuck-on\" appearing papules.  Pertinent Positives:  Pertinent Negatives:    Additional History of Present Condition:  Patient reports new bumps on the skin.  Denies itch, burn, pain, bleeding or ulceration.  Present constantly; nothing seems to make it worse or better.  No prior treatment.      Assessment and Plan:  Based on a thorough discussion of this condition and the management approach to it (including a comprehensive discussion of the known risks, side effects and potential benefits of treatment), the patient (family) agrees to implement the following specific plan:  Liquid nitrogen was applied for 10-12 seconds to the skin lesion and the expected blistering or scabbing reaction explained. Do not pick at the area. Patient reminded to expect hypopigmented scars from the procedure. Return if lesion fails to fully resolve.    Seborrheic Keratosis  A seborrheic keratosis is a harmless warty spot that appears during adult life as a common sign of skin aging.  Seborrheic keratoses can arise on any area of skin, covered or uncovered, with the usual exception of the palms and soles. They do not arise from mucous membranes. Seborrheic keratoses can have highly variable appearance.      Seborrheic keratoses are extremely common. It has been estimated that over 90% of adults over the age of 60 years have one or more of them. They occur in males and females of all races, typically beginning to erupt in the 30s or 40s. They are uncommon under the age of 20 years.  The precise cause of seborrhoeic keratoses is not known.  Seborrhoeic keratoses are considered degenerative in nature. As time goes by, seborrheic keratoses tend to become more numerous. Some people inherit a tendency to " "develop a very large number of them; some people may have hundreds of them.    The name \"seborrheic keratosis\" is misleading, because these lesions are not limited to a seborrhoeic distribution (scalp, mid-face, chest, upper back), nor are they formed from sebaceous glands, nor are they associated with sebum -- which is greasy.  Seborrheic keratosis may also be called \"SK,\" \"Seb K,\" \"basal cell papilloma,\" \"senile wart,\" or \"barnacle.\"      Researchers have noted:  Eruptive seborrhoeic keratoses can follow sunburn or dermatitis  Skin friction may be the reason they appear in body folds  Viral cause (e.g., human papillomavirus) seems unlikely  Stable and clonal mutations or activation of FRFR3, PIK3CA, ANDREEA, AKT1 and EGFR genes are found in seborrhoeic keratoses  Seborrhoeic keratosis can arise from solar lentigo  FRFR3 mutations also arise in solar lentigines. These mutations are associated with increased age and location on the head and neck, suggesting a role of ultraviolet radiation in these lesions  Seborrheic keratoses do not harbour tumour suppressor gene mutations  Epidermal growth factor receptor inhibitors, which are used to treat some cancers, often result in an increase in verrucal (warty) keratoses.    There is no easy way to remove multiple lesions on a single occasion.  Unless a specific lesion is \"inflamed\" and is causing pain or stinging/burning or is bleeding, most insurance companies do not authorize treatment.       SEBORRHEIC KERATOSIS; NON-INFLAMED    Physical Exam:  Anatomic Location Affected:  back, abdomen  Morphological Description:  Flat and raised, waxy, smooth to warty textured, yellow to brownish-grey to dark brown to blackish, discrete, \"stuck-on\" appearing papules.  Pertinent Positives:  Pertinent Negatives:    Additional History of Present Condition:  Patient reports new bumps on the skin.  Denies itch, burn, pain, bleeding or ulceration.  Present constantly; nothing seems to make it " "worse or better.  No prior treatment.      Assessment and Plan:  Based on a thorough discussion of this condition and the management approach to it (including a comprehensive discussion of the known risks, side effects and potential benefits of treatment), the patient (family) agrees to implement the following specific plan:  Reassured benign    Seborrheic Keratosis  A seborrheic keratosis is a harmless warty spot that appears during adult life as a common sign of skin aging.  Seborrheic keratoses can arise on any area of skin, covered or uncovered, with the usual exception of the palms and soles. They do not arise from mucous membranes. Seborrheic keratoses can have highly variable appearance.      Seborrheic keratoses are extremely common. It has been estimated that over 90% of adults over the age of 60 years have one or more of them. They occur in males and females of all races, typically beginning to erupt in the 30s or 40s. They are uncommon under the age of 20 years.  The precise cause of seborrhoeic keratoses is not known.  Seborrhoeic keratoses are considered degenerative in nature. As time goes by, seborrheic keratoses tend to become more numerous. Some people inherit a tendency to develop a very large number of them; some people may have hundreds of them.    The name \"seborrheic keratosis\" is misleading, because these lesions are not limited to a seborrhoeic distribution (scalp, mid-face, chest, upper back), nor are they formed from sebaceous glands, nor are they associated with sebum -- which is greasy.  Seborrheic keratosis may also be called \"SK,\" \"Seb K,\" \"basal cell papilloma,\" \"senile wart,\" or \"barnacle.\"      Researchers have noted:  Eruptive seborrhoeic keratoses can follow sunburn or dermatitis  Skin friction may be the reason they appear in body folds  Viral cause (e.g., human papillomavirus) seems unlikely  Stable and clonal mutations or activation of FRFR3, PIK3CA, ANDREEA, AKT1 and EGFR genes are " "found in seborrhoeic keratoses  Seborrhoeic keratosis can arise from solar lentigo  FRFR3 mutations also arise in solar lentigines. These mutations are associated with increased age and location on the head and neck, suggesting a role of ultraviolet radiation in these lesions  Seborrheic keratoses do not harbour tumour suppressor gene mutations  Epidermal growth factor receptor inhibitors, which are used to treat some cancers, often result in an increase in verrucal (warty) keratoses.    There is no easy way to remove multiple lesions on a single occasion.  Unless a specific lesion is \"inflamed\" and is causing pain or stinging/burning or is bleeding, most insurance companies do not authorize treatment.     NOTALGIA PARESTHETICA    Physical Exam:  Anatomic Location Affected:  left upper back   Morphological Description:  clear today  Pertinent Positives:  Pertinent Negatives:    Additional History of Present Condition:  patient notes that has chronic itching in the same spot    Assessment and Plan:  Based on a thorough discussion of this condition and the management approach to it (including a comprehensive discussion of the known risks, side effects and potential benefits of treatment), the patient (family) agrees to implement the following specific plan:  Reassured benign    Assessment and Plan:  Notalgia paresthetica is a condition where itch and changed sensation arise in the areas of skin on the medial aspect of the shoulder blade on either side of the back. Notalgia means pain in the back, and paraesthetica refers to burning pain, tingling or itch. It is also called thoracic cutaneous nerve entrapment syndrome.    The nerves which supply sensation to the upper back emerge from the spinal cord (2nd to 6th thoracic segments) and run a long course up through the thick muscles of the back. They make a right-angled turn before reaching the skin. The nerves appear to be vulnerable to compression or traction. Partial " "compression or injury leads to the symptoms.   Initial injury to the nerves may include:  Back injury  Herniated or \"slipped\" disc  Herpes zoster (shingles)  Sunburn  Myelopathy (muscular disease)  Small fiber neuropathy    Notalgia paresthetica often starts after a period of intense exercise leading to muscular stiffness, or a period of inactivity. Sometimes, a specific injury may be recalled.       It is characterized by intense itch on the medial border of one scapula or both, ie, between the shoulder blades. This itch can be intermittent or continuous. It is unrelieved by scratching, although the scratching and rubbing may be pleasurable.   The affected area may spread to both shoulder blades and more widely over the back and shoulders.  In many patients, there are no visible signs.   Visible changes often arise from rubbing and scratching the affected area. These include:  Scratch marks  Hyperpigmentation (brown marks)  Hypopigmentation (white marks)  Lichen simplex (a type of eczema)  Scarring    There may be changed sensation in the affected area of skin in response to light touch, sharp objects, cold, and heat. There may be reduced or absent sweating in the affected area.    Radiology such as X-ray, CT scan or MRI may demonstrate a degenerative vertebra or prolapsed disc in the area that corresponds to the nerve supply to the affected skin. In many cases, no abnormality is revealed.     Treatment is not always necessary, and it is not always successful. Typical management may include the following:  Cooling lotions or creams as required (camphor and menthol)  Topical steroids to treat associated lichen simplex  Capsaicin cream - this depletes nerve endings of their chemical transmitters but requires frequent reapplication and causes unpleasant local side effects  Local anesthetic creams may provide temporary relief of symptoms  Amitriptyline or other oral tricyclic medications at night to help sleep and " "counteract neuropathic symptoms  Gabapentin, pregabalin or other anticonvulsants can relieve unpleasant burning or tingling sensations  Transcutaneous electrical nerve stimulation (TENS)  Surgical decompression of vertebral nerve impingement    Physical therapy with repetitive exercises and stretches for the upper back has been reported to be effective.    While sitting, cross arms and bend forwards to stretch upper back  Arms at sides, raise shoulders and rotate them forwards and backwards  Arms straight, rotate forwards 360 degrees and backwards 360 degrees  Rotate upper body left and right until stretch is felt and hold  Massage the muscles besides the spine in the affected area      SEBORRHEIC KERATOSIS; NON-INFLAMED    Physical Exam:  Anatomic Location Affected:  right flank  Morphological Description:  Flat and raised, waxy, smooth to warty textured, yellow to brownish-grey to dark brown to blackish, discrete, \"stuck-on\" appearing papules.  Pertinent Positives:  Pertinent Negatives:    Additional History of Present Condition:  Patient reports new bumps on the skin.  Denies itch, burn, pain, bleeding or ulceration.  Present constantly; nothing seems to make it worse or better.  No prior treatment.      Assessment and Plan:  Based on a thorough discussion of this condition and the management approach to it (including a comprehensive discussion of the known risks, side effects and potential benefits of treatment), the patient (family) agrees to implement the following specific plan:  Can consider cosmetic removal $20 each; not covered under insurance; considered cosmetic    Seborrheic Keratosis  A seborrheic keratosis is a harmless warty spot that appears during adult life as a common sign of skin aging.  Seborrheic keratoses can arise on any area of skin, covered or uncovered, with the usual exception of the palms and soles. They do not arise from mucous membranes. Seborrheic keratoses can have highly variable " "appearance.      Seborrheic keratoses are extremely common. It has been estimated that over 90% of adults over the age of 60 years have one or more of them. They occur in males and females of all races, typically beginning to erupt in the 30s or 40s. They are uncommon under the age of 20 years.  The precise cause of seborrhoeic keratoses is not known.  Seborrhoeic keratoses are considered degenerative in nature. As time goes by, seborrheic keratoses tend to become more numerous. Some people inherit a tendency to develop a very large number of them; some people may have hundreds of them.    The name \"seborrheic keratosis\" is misleading, because these lesions are not limited to a seborrhoeic distribution (scalp, mid-face, chest, upper back), nor are they formed from sebaceous glands, nor are they associated with sebum -- which is greasy.  Seborrheic keratosis may also be called \"SK,\" \"Seb K,\" \"basal cell papilloma,\" \"senile wart,\" or \"barnacle.\"      Researchers have noted:  Eruptive seborrhoeic keratoses can follow sunburn or dermatitis  Skin friction may be the reason they appear in body folds  Viral cause (e.g., human papillomavirus) seems unlikely  Stable and clonal mutations or activation of FRFR3, PIK3CA, ANDREEA, AKT1 and EGFR genes are found in seborrhoeic keratoses  Seborrhoeic keratosis can arise from solar lentigo  FRFR3 mutations also arise in solar lentigines. These mutations are associated with increased age and location on the head and neck, suggesting a role of ultraviolet radiation in these lesions  Seborrheic keratoses do not harbour tumour suppressor gene mutations  Epidermal growth factor receptor inhibitors, which are used to treat some cancers, often result in an increase in verrucal (warty) keratoses.    There is no easy way to remove multiple lesions on a single occasion.  Unless a specific lesion is \"inflamed\" and is causing pain or stinging/burning or is bleeding, most insurance companies do not " authorize treatment.

## 2025-03-17 NOTE — PATIENT INSTRUCTIONS
"DO NOT BILL PATIENT !!! PATIENT PAID AT TIME OF SERVICE !!!    SEBORRHEIC KERATOSIS; NON-INFLAMED    Physical Exam:  Anatomic Location Affected:  BACK, abdomen  Morphological Description:  Flat and raised, waxy, smooth to warty textured, yellow to brownish-grey to dark brown to blackish, discrete, \"stuck-on\" appearing papules.  Pertinent Positives:  Pertinent Negatives:    Additional History of Present Condition:  Patient reports new bumps on the skin.  Denies itch, burn, pain, bleeding or ulceration.  Present constantly; nothing seems to make it worse or better.  No prior treatment.      Assessment and Plan:  Based on a thorough discussion of this condition and the management approach to it (including a comprehensive discussion of the known risks, side effects and potential benefits of treatment), the patient (family) agrees to implement the following specific plan:  Liquid nitrogen was applied for 10-12 seconds to the skin lesion and the expected blistering or scabbing reaction explained. Do not pick at the area. Patient reminded to expect hypopigmented scars from the procedure. Return if lesion fails to fully resolve.      Seborrheic Keratosis  A seborrheic keratosis is a harmless warty spot that appears during adult life as a common sign of skin aging.  Seborrheic keratoses can arise on any area of skin, covered or uncovered, with the usual exception of the palms and soles. They do not arise from mucous membranes. Seborrheic keratoses can have highly variable appearance.      Seborrheic keratoses are extremely common. It has been estimated that over 90% of adults over the age of 60 years have one or more of them. They occur in males and females of all races, typically beginning to erupt in the 30s or 40s. They are uncommon under the age of 20 years.  The precise cause of seborrhoeic keratoses is not known.  Seborrhoeic keratoses are considered degenerative in nature. As time goes by, seborrheic keratoses tend to " "become more numerous. Some people inherit a tendency to develop a very large number of them; some people may have hundreds of them.    The name \"seborrheic keratosis\" is misleading, because these lesions are not limited to a seborrhoeic distribution (scalp, mid-face, chest, upper back), nor are they formed from sebaceous glands, nor are they associated with sebum -- which is greasy.  Seborrheic keratosis may also be called \"SK,\" \"Seb K,\" \"basal cell papilloma,\" \"senile wart,\" or \"barnacle.\"      Researchers have noted:  Eruptive seborrhoeic keratoses can follow sunburn or dermatitis  Skin friction may be the reason they appear in body folds  Viral cause (e.g., human papillomavirus) seems unlikely  Stable and clonal mutations or activation of FRFR3, PIK3CA, ANDREEA, AKT1 and EGFR genes are found in seborrhoeic keratoses  Seborrhoeic keratosis can arise from solar lentigo  FRFR3 mutations also arise in solar lentigines. These mutations are associated with increased age and location on the head and neck, suggesting a role of ultraviolet radiation in these lesions  Seborrheic keratoses do not harbour tumour suppressor gene mutations  Epidermal growth factor receptor inhibitors, which are used to treat some cancers, often result in an increase in verrucal (warty) keratoses.    There is no easy way to remove multiple lesions on a single occasion.  Unless a specific lesion is \"inflamed\" and is causing pain or stinging/burning or is bleeding, most insurance companies do not authorize treatment.    DO NOT BILL PATIENT !!! PATIENT PAID AT TIME OF SERVICE !!!    "

## 2025-04-08 ENCOUNTER — TELEPHONE (OUTPATIENT)
Age: 85
End: 2025-04-08

## 2025-04-09 NOTE — TELEPHONE ENCOUNTER
To is out of the office until next week, can someone please fill if appropriate.     Thank you,   Dinora

## 2025-04-14 DIAGNOSIS — T88.7XXA SIDE EFFECT OF MEDICATION: ICD-10-CM

## 2025-04-14 RX ORDER — FOLIC ACID 1 MG/1
1 TABLET ORAL DAILY
Qty: 90 TABLET | Refills: 1 | Status: SHIPPED | OUTPATIENT
Start: 2025-04-14

## 2025-05-22 DIAGNOSIS — L66.10 LICHEN PLANOPILARIS: ICD-10-CM

## 2025-05-22 RX ORDER — CALCIPOTRIENE 0.05 MG/ML
SOLUTION TOPICAL
Qty: 60 ML | Refills: 0 | Status: SHIPPED | OUTPATIENT
Start: 2025-05-22

## 2025-07-08 ENCOUNTER — PATIENT MESSAGE (OUTPATIENT)
Age: 85
End: 2025-07-08

## 2025-07-09 DIAGNOSIS — L64.9 ANDROGENETIC ALOPECIA: Primary | ICD-10-CM

## 2025-07-09 RX ORDER — MINOXIDIL 2.5 MG/1
1.25 TABLET ORAL DAILY
Qty: 30 TABLET | Refills: 3 | Status: SHIPPED | OUTPATIENT
Start: 2025-07-09

## 2025-07-17 ENCOUNTER — TELEPHONE (OUTPATIENT)
Age: 85
End: 2025-07-17

## 2025-07-17 ENCOUNTER — OFFICE VISIT (OUTPATIENT)
Age: 85
End: 2025-07-17

## 2025-07-17 VITALS — HEIGHT: 63 IN | BODY MASS INDEX: 20.73 KG/M2 | WEIGHT: 117 LBS

## 2025-07-17 DIAGNOSIS — L27.0 DRUG ERUPTION: ICD-10-CM

## 2025-07-17 DIAGNOSIS — N39.0 URINARY TRACT INFECTION WITHOUT HEMATURIA, SITE UNSPECIFIED: Primary | ICD-10-CM

## 2025-07-17 RX ORDER — TRIAMCINOLONE ACETONIDE 1 MG/G
CREAM TOPICAL
Qty: 80 G | Refills: 1 | Status: SHIPPED | OUTPATIENT
Start: 2025-07-17

## 2025-07-17 RX ORDER — CIPROFLOXACIN 500 MG/1
500 TABLET, FILM COATED ORAL EVERY 12 HOURS SCHEDULED
Qty: 14 TABLET | Refills: 0 | Status: SHIPPED | OUTPATIENT
Start: 2025-07-17 | End: 2025-07-24

## 2025-07-17 NOTE — TELEPHONE ENCOUNTER
Received call from Patient for Follow Up - Skin Reaction. Patient wanted to be seen by To 7/17/25 but she is not in the office that day. Scheduled 7/21/25 8:20 am Xiomara Blackwell. Verified insurance, provided [facility].     Offered 2 cancellations on 7/17/25 in [place], patient rejected as too far away.     Patient verbalized she may have to go elsewhere prior to her appt to be seen.

## 2025-07-17 NOTE — TELEPHONE ENCOUNTER
Pt calling asking why her medications were not sent to the pharmacy yet. She saw Dr Solano today at 420pm for very itchy rash and was prescribed     CIPRO 500mg  TRIAMCINOLONE 0.1% cream    Sent to [facility] on [address] [place]    Sent Dr BALTAZAR a message via secure chat because pt was upset and very uncomfortable she says. Dr BALTAZAR sent meds and I let pt know they are showing receipt confirmed by pharmacy 532pm. Pt expressed gratitude.

## 2025-07-17 NOTE — PATIENT INSTRUCTIONS
DRUG ERUPTION      Assessment and Plan:  Based on a thorough discussion of this condition and the management approach to it (including a comprehensive discussion of the known risks, side effects and potential benefits of treatment), the patient (family) agrees to implement the following specific plan:  Discontinue Sulfamethizole prescription.   Start taking Cipro 500 mg twice daily for 7 days.   Start using Triamcinolone 0.1% cream, twice daily to the rash until clear.   Can take Benadryl at night for itching.     Follow up as needed.

## 2025-07-17 NOTE — PROGRESS NOTES
"[facility] Note     Patient Name: Cierra Mane  Encounter Date: 07/17/2025       Have you been cared for by a [facility] Dermatologist in the last 3 years and, if so, which description applies to you? Yes. I have been here within the last 3 years, and my medical history has NOT changed since that time. I am not of child-bearing potential.     REVIEW OF SYSTEMS:  Have you recently had or currently have any of the following? No changes in my recent health.   PAST MEDICAL HISTORY:  Have you personally ever had or currently have any of the following?  If \"YES,\" then please provide more detail. No changes in my medical history.   HISTORY OF IMMUNOSUPPRESSION: Do you have a history of any of the following:  Systemic Immunosuppression such as Diabetes, Biologic or Immunotherapy, Chemotherapy, Organ Transplantation, Bone Marrow Transplantation or Prednisone?  No     Answering \"YES\" requires the addition of the dotphrase \"IMMUNOSUPPRESSED\" as the first diagnosis of the patient's visit.   FAMILY HISTORY:  Any \"first degree relatives\" (parent, brother, sister, or child) with the following?    No changes in my family's known health.   PATIENT EXPERIENCE:    Do you want the Dermatologist to perform a COMPLETE skin exam today including a clinical examination under the \"bra and underwear\" areas?  NO  If necessary, do we have your permission to call and leave a detailed message on your Preferred Phone number that includes your specific medical information?  Yes      Allergies[1] Current Medications[1]              Whom besides the patient is providing clinical information about today's encounter?   NO ADDITIONAL HISTORIAN (patient alone provided history)    Physical Exam and Assessment/Plan by Diagnosis:    DRUG ERUPTION    Physical Exam:  Anatomic Location Affected, Morphological Description: Right lateral shin - 3.0 x 2.5  cm non blanching erythematous annular macule.       Additional History of Present Condition: "  Patient present with a rash across the chest and right lateral leg. She recently started taking sulfamethiazole for a UTI and later that day broke out in a rash.     Assessment and Plan:  Based on a thorough discussion of this condition and the management approach to it (including a comprehensive discussion of the known risks, side effects and potential benefits of treatment), the patient (family) agrees to implement the following specific plan:  Discontinue Sulfamethizole prescription.   Start taking Cipro 500 mg twice daily for 7 days.   Start using Triamcinolone 0.1% cream, twice daily to the rash until clear.   Can take Benadryl at night for itching.     Follow up as needed.   Scribe Attestation    I,:  Charleen Lopez MA am acting as a scribe while in the presence of the attending physician.:       I,:  Krissy Solano MD personally performed the services described in this documentation    as scribed in my presence.:                [1]  Allergies  Allergen Reactions   • Albuterol Other (See Comments)     Atrial fibrillation    Afib   • Dapsone Facial Swelling   • Epinephrine    • Mupirocin    • Seasonal Ic [Cholestatin] Itching   • Cephalosporins Rash   [1]    Current Outpatient Medications:   •  b complex vitamins capsule, Take 1 capsule by mouth in the morning., Disp: , Rfl:   •  calcipotriene (DOVONEX) 0.005 % topical solution, APPLY TOPICALLY TWO TIMES A DAY (GENERIC FOR DOVONEX), Disp: 60 mL, Rfl: 0  •  Cholecalciferol (VITAMIN D3 PO), Take by mouth, Disp: , Rfl:   •  cyanocobalamin (VITAMIN B-12) 100 mcg tablet, Take by mouth in the morning., Disp: , Rfl:   •  cycloSPORINE (RESTASIS) 0.05 % ophthalmic emulsion, 1 drop by Tube route in the morning and 1 drop in the evening., Disp: , Rfl:   •  dutasteride (AVODART) 0.5 mg capsule, TAKE ONE CAPSULE BY MOUTH EVERY DAY, Disp: 90 capsule, Rfl: 1  •  estradiol (ESTRACE) 0.1 mg/g vaginal cream, Insert 0.5 g into the vagina 3 (three) times a week, Disp: 42.5  g, Rfl: 2  •  folic acid (FOLVITE) 1 mg tablet, Take 1 tablet (1 mg total) by mouth daily, Disp: 90 tablet, Rfl: 1  •  furosemide (LASIX) 20 mg tablet, , Disp: , Rfl:   •  Ivermectin (Soolantra) 1 % CREA, Apply topically 1-2 times a day to face area as needed for rosacea., Disp: 45 g, Rfl: 5  •  Lutein 40 MG CAPS, Take by mouth in the morning., Disp: , Rfl:   •  Magnesium 500 MG CAPS, Take by mouth, Disp: , Rfl:   •  metoprolol succinate (TOPROL-XL) 25 mg 24 hr tablet, Take 25 mg by mouth in the morning., Disp: , Rfl:   •  minoxidil (LONITEN) 2.5 mg tablet, Take 0.5 tablets (1.25 mg total) by mouth daily, Disp: 30 tablet, Rfl: 3  •  spironolactone (ALDACTONE) 25 mg tablet, Take 25 mg by mouth in the morning., Disp: , Rfl:   •  clindamycin 1 % gel, Apply topically 2 (two) times a day (Patient not taking: Reported on 9/12/2024), Disp: 60 g, Rfl: 1  •  clobetasol (TEMOVATE) 0.05 % ointment, , Disp: , Rfl:   •  ketoconazole (NIZORAL) 2 % shampoo, Wash straight for 2 weeks once a day then once clear for maintenance only wash hair 2-3 times per week, Disp: 120 mL, Rfl: 4  •  Magnesium Gluconate 550 MG TABS, Take 30 mg by mouth 2 (two) times a day   (Patient not taking: Reported on 3/17/2025), Disp: , Rfl:   •  valACYclovir (VALTREX) 1,000 mg tablet, Take 1 tablet (1,000 mg total) by mouth 2 (two) times a day for 3 days, Disp: 6 tablet, Rfl: 0  •  valACYclovir (VALTREX) 1,000 mg tablet, Take 1 tablet (1,000 mg total) by mouth 2 (two) times a day for 5 days Start two days prior to planned procedure, Disp: 10 tablet, Rfl: 2

## 2025-07-21 DIAGNOSIS — L71.9 ROSACEA: Primary | ICD-10-CM

## 2025-07-21 RX ORDER — DAPSONE 75 MG/G
GEL TOPICAL
Qty: 60 G | Refills: 1 | Status: SHIPPED | OUTPATIENT
Start: 2025-07-21

## 2025-08-11 ENCOUNTER — OFFICE VISIT (OUTPATIENT)
Age: 85
End: 2025-08-11
Payer: COMMERCIAL

## 2025-08-13 ENCOUNTER — TELEPHONE (OUTPATIENT)
Age: 85
End: 2025-08-13

## 2025-08-14 ENCOUNTER — PATIENT MESSAGE (OUTPATIENT)
Age: 85
End: 2025-08-14

## 2025-08-15 ENCOUNTER — PATIENT MESSAGE (OUTPATIENT)
Dept: DERMATOLOGY | Facility: CLINIC | Age: 85
End: 2025-08-15

## 2025-08-16 ENCOUNTER — PATIENT MESSAGE (OUTPATIENT)
Age: 85
End: 2025-08-16